# Patient Record
Sex: FEMALE | Race: WHITE | Employment: UNEMPLOYED | URBAN - METROPOLITAN AREA
[De-identification: names, ages, dates, MRNs, and addresses within clinical notes are randomized per-mention and may not be internally consistent; named-entity substitution may affect disease eponyms.]

---

## 2015-06-24 LAB — GLUCOSE SERPL-MCNC: 164 MG/DL (ref 70–139)

## 2017-03-27 ENCOUNTER — TELEPHONE (OUTPATIENT)
Dept: INTERNAL MEDICINE | Facility: CLINIC | Age: 42
End: 2017-03-27

## 2017-03-27 DIAGNOSIS — Z12.39 SCREENING FOR BREAST CANCER: Primary | ICD-10-CM

## 2017-03-27 NOTE — TELEPHONE ENCOUNTER
Reason for Call: Request for an order or referral:    Order or referral being requested: mammogram    Date needed: as soon as possible    Has the patient been seen by the PCP for this problem? YES    Additional comments: none    Phone number Patient can be reached at:  Home number on file [phone] (home)    Best Time:  any    Can we leave a detailed message on this number?  YES    Call taken on 3/27/2017 at 12:02 PM by Kaity Alejandra

## 2017-03-28 NOTE — TELEPHONE ENCOUNTER
Last OV with PCP: 12/23/16 for rash. Per [employer] pt has appt scheduled for MA for tomorrow.    Please advise, thanks.

## 2017-03-29 ENCOUNTER — HOSPITAL ENCOUNTER (OUTPATIENT)
Dept: MAMMOGRAPHY | Facility: CLINIC | Age: 42
Discharge: HOME | End: 2017-03-29
Attending: OBSTETRICS & GYNECOLOGY | Admitting: OBSTETRICS & GYNECOLOGY
Payer: COMMERCIAL

## 2017-03-29 DIAGNOSIS — Z12.31 VISIT FOR SCREENING MAMMOGRAM: ICD-10-CM

## 2017-03-29 PROCEDURE — G0202 SCR MAMMO BI INCL CAD: HCPCS

## 2017-04-05 ENCOUNTER — HOSPITAL ENCOUNTER (OUTPATIENT)
Dept: ULTRASOUND IMAGING | Facility: CLINIC | Age: 42
End: 2017-04-05
Attending: OBSTETRICS & GYNECOLOGY
Payer: COMMERCIAL

## 2017-04-05 ENCOUNTER — HOSPITAL ENCOUNTER (OUTPATIENT)
Dept: MAMMOGRAPHY | Facility: CLINIC | Age: 42
Discharge: HOME | End: 2017-04-05
Attending: OBSTETRICS & GYNECOLOGY | Admitting: OBSTETRICS & GYNECOLOGY
Payer: COMMERCIAL

## 2017-04-05 DIAGNOSIS — R92.8 ABNORMAL MAMMOGRAM: ICD-10-CM

## 2017-04-05 PROCEDURE — 76642 ULTRASOUND BREAST LIMITED: CPT | Mod: RT

## 2017-04-05 PROCEDURE — G0279 TOMOSYNTHESIS, MAMMO: HCPCS

## 2017-04-10 ENCOUNTER — TRANSFERRED RECORDS (OUTPATIENT)
Dept: HEALTH INFORMATION MANAGEMENT | Facility: CLINIC | Age: 42
End: 2017-04-10

## 2017-04-14 ENCOUNTER — TRANSFERRED RECORDS (OUTPATIENT)
Dept: HEALTH INFORMATION MANAGEMENT | Facility: CLINIC | Age: 42
End: 2017-04-14

## 2017-04-19 ENCOUNTER — TRANSFERRED RECORDS (OUTPATIENT)
Dept: HEALTH INFORMATION MANAGEMENT | Facility: CLINIC | Age: 42
End: 2017-04-19

## 2017-04-23 LAB
ALT SERPL-CCNC: 13 U/L (ref 6–29)
AST SERPL-CCNC: 15 U/L (ref 10–30)
CREAT SERPL-MCNC: 0.7 MG/DL (ref 0.5–1.1)
GFR SERPL CREATININE-BSD FRML MDRD: 108 ML/MIN/1.73M2
GLUCOSE SERPL-MCNC: 89 MG/DL (ref 65–99)
HEP C HIM: NORMAL
POTASSIUM SERPL-SCNC: 4.3 MMOL/L (ref 3.5–5.3)

## 2017-04-25 ENCOUNTER — TRANSFERRED RECORDS (OUTPATIENT)
Dept: HEALTH INFORMATION MANAGEMENT | Facility: CLINIC | Age: 42
End: 2017-04-25

## 2017-04-25 LAB
C TRACH DNA SPEC QL PROBE+SIG AMP: NOT DETECTED
N GONORRHOEA DNA SPEC QL PROBE+SIG AMP: NOT DETECTED
SPECIMEN DESCRIP: NORMAL
SPECIMEN DESCRIPTION: NORMAL

## 2017-05-17 ENCOUNTER — OFFICE VISIT (OUTPATIENT)
Dept: FAMILY MEDICINE | Facility: CLINIC | Age: 42
End: 2017-05-17
Payer: COMMERCIAL

## 2017-05-17 VITALS
BODY MASS INDEX: 27.28 KG/M2 | DIASTOLIC BLOOD PRESSURE: 88 MMHG | HEART RATE: 75 BPM | WEIGHT: 184.19 LBS | TEMPERATURE: 98.7 F | SYSTOLIC BLOOD PRESSURE: 138 MMHG | HEIGHT: 69 IN

## 2017-05-17 DIAGNOSIS — E28.2 PCOS (POLYCYSTIC OVARIAN SYNDROME): ICD-10-CM

## 2017-05-17 DIAGNOSIS — Z00.00 ENCOUNTER FOR ROUTINE ADULT HEALTH EXAMINATION WITHOUT ABNORMAL FINDINGS: ICD-10-CM

## 2017-05-17 DIAGNOSIS — F41.9 ANXIETY: Primary | ICD-10-CM

## 2017-05-17 DIAGNOSIS — Z71.3 NUTRITIONAL COUNSELING: ICD-10-CM

## 2017-05-17 DIAGNOSIS — G43.809 OTHER MIGRAINE WITHOUT STATUS MIGRAINOSUS, NOT INTRACTABLE: ICD-10-CM

## 2017-05-17 DIAGNOSIS — Z98.890 H/O LEEP: ICD-10-CM

## 2017-05-17 PROCEDURE — 99213 OFFICE O/P EST LOW 20 MIN: CPT | Mod: 25 | Performed by: FAMILY MEDICINE

## 2017-05-17 PROCEDURE — 99396 PREV VISIT EST AGE 40-64: CPT | Performed by: FAMILY MEDICINE

## 2017-05-17 RX ORDER — SUMATRIPTAN 100 MG/1
100 TABLET, FILM COATED ORAL
Qty: 18 TABLET | Refills: 3 | Status: SHIPPED | OUTPATIENT
Start: 2017-05-17 | End: 2017-08-03

## 2017-05-17 RX ORDER — MULTIVIT-MIN/IRON/FOLIC ACID/K 18-600-40
CAPSULE ORAL DAILY
COMMUNITY
End: 2018-08-09

## 2017-05-17 RX ORDER — PRENATAL VIT/IRON FUM/FOLIC AC 27MG-0.8MG
1 TABLET ORAL DAILY
COMMUNITY
End: 2017-06-14

## 2017-05-17 RX ORDER — HYDROXYZINE PAMOATE 25 MG/1
25-50 CAPSULE ORAL 3 TIMES DAILY PRN
Qty: 30 CAPSULE | Refills: 3 | Status: SHIPPED | OUTPATIENT
Start: 2017-05-17 | End: 2018-01-30

## 2017-05-17 ASSESSMENT — ANXIETY QUESTIONNAIRES
1. FEELING NERVOUS, ANXIOUS, OR ON EDGE: MORE THAN HALF THE DAYS
5. BEING SO RESTLESS THAT IT IS HARD TO SIT STILL: MORE THAN HALF THE DAYS
6. BECOMING EASILY ANNOYED OR IRRITABLE: NEARLY EVERY DAY
7. FEELING AFRAID AS IF SOMETHING AWFUL MIGHT HAPPEN: SEVERAL DAYS
GAD7 TOTAL SCORE: 14
3. WORRYING TOO MUCH ABOUT DIFFERENT THINGS: MORE THAN HALF THE DAYS
2. NOT BEING ABLE TO STOP OR CONTROL WORRYING: MORE THAN HALF THE DAYS
IF YOU CHECKED OFF ANY PROBLEMS ON THIS QUESTIONNAIRE, HOW DIFFICULT HAVE THESE PROBLEMS MADE IT FOR YOU TO DO YOUR WORK, TAKE CARE OF THINGS AT HOME, OR GET ALONG WITH OTHER PEOPLE: SOMEWHAT DIFFICULT

## 2017-05-17 ASSESSMENT — PATIENT HEALTH QUESTIONNAIRE - PHQ9: 5. POOR APPETITE OR OVEREATING: MORE THAN HALF THE DAYS

## 2017-05-17 NOTE — Clinical Note
Please abstract the following data from this visit with this patient into the appropriate field in Epic:  Pap smear done on this date: 9/15/2016 (approximately), by this group: [facility], results were normal.

## 2017-05-17 NOTE — NURSING NOTE
"Chief Complaint   Patient presents with     Physical       Initial /88 (BP Location: Right arm, Patient Position: Chair, Cuff Size: Adult Regular)  Pulse 75  Temp 98.7  F (37.1  C) (Oral)  Ht 5' 8.75\" (1.746 m)  Wt 184 lb 3 oz (83.5 kg)  LMP 05/17/2017  Breastfeeding? No  BMI 27.4 kg/m2 Estimated body mass index is 27.4 kg/(m^2) as calculated from the following:    Height as of this encounter: 5' 8.75\" (1.746 m).    Weight as of this encounter: 184 lb 3 oz (83.5 kg).  Medication Reconciliation: complete     Health maintenance- map given.    Zach Frias CMA            "

## 2017-05-17 NOTE — PATIENT INSTRUCTIONS
https://ods.od.nih.gov/factsheets/Calcium-HealthProfessional/     Preventive Health Recommendations  Female Ages 40 to 49    Yearly exam:     See your health care provider every year in order to  1. Review health changes.   2. Discuss preventive care.    3. Review your medicines if your doctor prescribed any.      Get a Pap test every three years (unless you have an abnormal result and your provider advises testing more often).      If you get Pap tests with HPV test, you only need to test every 5 years, unless you have an abnormal result. You do not need a Pap test if your uterus was removed (hysterectomy) and you have not had cancer.      You should be tested each year for STDs (sexually transmitted diseases), if you're at risk.       Ask your doctor if you should have a mammogram.      Have a colonoscopy (test for colon cancer) if someone in your family has had colon cancer or polyps before age 50.       Have a cholesterol test every 5 years.       Have a diabetes test (fasting glucose) after age 45. If you are at risk for diabetes, you should have this test every 3 years.    Shots: Get a flu shot each year. Get a tetanus shot every 10 years.     Nutrition:     Eat at least 5 servings of fruits and vegetables each day.    Eat whole-grain bread, whole-wheat pasta and brown rice instead of white grains and rice.    Talk to your provider about Calcium and Vitamin D.     Lifestyle    Exercise at least 150 minutes a week (an average of 30 minutes a day, 5 days a week). This will help you control your weight and prevent disease.    Limit alcohol to one drink per day.    No smoking.     Wear sunscreen to prevent skin cancer.    See your dentist every six months for an exam and cleaning.

## 2017-05-17 NOTE — LETTER
My Migraine Action Plan      Date: 5/17/2017     My Name: Radha Fuentes   YOB: 1975  My Pharmacy:    [facility] [address] [place], [place] - [address]  [facility] - [place], [place] - [address] AT [facility] [address] [place] [address]       My (Preventative) Control Medicine: ***        My Rescue Medicine: ***   My Doctor: Sumi Carreon     My Clinic: [facility]  [address] [facility] [address] [phone]        GREEN ZONE = Good Control    My headache plan is working.   I can do what I need to do.           I WILL:     ? Keep managing my triggers.  ? Write in my migraine diary each time I have a headache.  ? Keep taking my preventive (controller) medicine daily.  ? Take my relief and rescue medicine as needed.             YELLOW ZONE = Not Enough Control    My headache plan isn t always working.   My headaches keep me from doing   some of the things I need to do.       I WILL:     ? Set goals to control my triggers and act on them.  ? Write in my migraine diary each time I have a headache and review it for                      patterns or new triggers.  ? Keep taking my preventive (controller) medicine daily.  ? Take my relief and rescue medicine as needed.  ? Call my doctor or clinic at if I stay in the Yellow Zone.             RED ZONE = Poor or No Control    My headache plan has  failed. I can t do anything  when I have one. My  medicines aren t working.           I WILL:   ? Set goals to control my triggers and act on them.  ? Write in my migraine diary each time I have a headache and review it for                      patterns or new triggers.  ? Keep taking my preventive (controller) medicine daily.  ? Take my relief and rescue medicine as needed.  ? Call my doctor or clinic or go to urgent care or an ER if I m having the worst                  headache of my life.  ? Call my doctor or clinic or go to  urgent care or an ER if my medicine doesn t work.  ? Let my doctor or clinic know within 2 weeks if I have gone to an urgent care or             emergency department.          Provider specific instructions:  ***

## 2017-05-17 NOTE — PROGRESS NOTES
SUBJECTIVE:     CC: Radha Fuentes is an 41 year old woman who presents for preventive health visit.     Healthy Habits:    Do you get at least three servings of calcium containing foods daily (dairy, green leafy vegetables, etc.)? no, taking calcium and/or vitamin D supplement: yes - D    Amount of exercise or daily activities, outside of work: 2 day(s) per week    Problems taking medications regularly No    Medication side effects: No    Have you had an eye exam in the past two years? yes    Do you see a dentist twice per year? yes    Do you have sleep apnea, excessive snoring or daytime drowsiness? no    Please abstract the following data from this visit with this patient into the appropriate field in Epic:    Pap smear done on this date: 9/15/2016 (approximately), by this group: Casie MYLES, results were normal.     History of LEEP in 2012, normal paps since. In 1990s, had cone biopsy.     Is on a restrictive 2930-1276 calorie diet, worries about getting enough calcium and iron. Working with IVF specialist through [facility], hoping for pregnancy in the next year. Has a 20 month old and 14 year old at home. Taking PNV. History of PCOS, was previously on metformin, not taking any longer.     Feels like she has significant anxiety a few times per month, feels like she loses control. Has been in therapy in the past, didn't find it very helpful. Has been on ativan and SSRIs in the past. Not interested in daily medication to help with symptoms that occur a few times monthly.     Follows with Neurology for migraines, would like me to take over imitrex scripts. Feels these are very well controlled.       Today's PHQ-2 Score:   PHQ-2 ( 1999 Pfizer) 1/13/2015   Q1: Little interest or pleasure in doing things 0   Q2: Feeling down, depressed or hopeless 0   PHQ-2 Score 0       Abuse: Current or Past(Physical, Sexual or Emotional)- No  Do you feel safe in your environment - Yes    Social History   Substance Use Topics  "    Smoking status: Former Smoker     Smokeless tobacco: Not on file     Alcohol use No     The patient does not drink >3 drinks per day nor >7 drinks per week.    Recent Labs   Lab Test  01/05/11   1153   CHOL  186   HDL  67   LDL  101   TRIG  85   CHOLHDLRATIO  2.8       Reviewed orders with patient.  Reviewed health maintenance and updated orders accordingly - Yes    Mammo Decision Support:  Patient under age 50, mutual decision reflected in health maintenance.      Pertinent mammograms are reviewed under the imaging tab.  History of abnormal Pap smear: NO - age 30- 65 PAP every 3 years recommended    Reviewed and updated as needed this visit by clinical staff  Tobacco  Allergies  Meds  Med Hx  Surg Hx  Fam Hx  Soc Hx        Reviewed and updated as needed this visit by Provider        ROS:  C: NEGATIVE for fever, chills, change in weight  I: NEGATIVE for worrisome rashes, moles or lesions  E: NEGATIVE for vision changes or irritation  ENT: NEGATIVE for ear, mouth and throat problems  R: NEGATIVE for significant cough or SOB  B: NEGATIVE for masses, tenderness or discharge  CV: NEGATIVE for chest pain, palpitations or peripheral edema  GI: NEGATIVE for nausea, abdominal pain, heartburn, or change in bowel habits  : NEGATIVE for unusual urinary or vaginal symptoms. Periods are irregular.  M: NEGATIVE for significant arthralgias or myalgia  N: NEGATIVE for weakness, dizziness or paresthesias  P: NEGATIVE for changes in mood or affect    Problem list, Medication list, Allergies, and Medical/Social/Surgical histories reviewed in [facility] and updated as appropriate.  OBJECTIVE:     /88 (BP Location: Right arm, Patient Position: Chair, Cuff Size: Adult Regular)  Pulse 75  Temp 98.7  F (37.1  C) (Oral)  Ht 5' 8.75\" (1.746 m)  Wt 184 lb 3 oz (83.5 kg)  LMP 05/17/2017  Breastfeeding? No  BMI 27.4 kg/m2  EXAM:  GENERAL: healthy, alert and no distress  EYES: Eyes grossly normal to inspection, PERRL and " conjunctivae and sclerae normal  HENT: ear canals and TM's normal, nose and mouth without ulcers or lesions  NECK: no adenopathy, no asymmetry, masses, or scars and thyroid normal to palpation  RESP: lungs clear to auscultation - no rales, rhonchi or wheezes  CV: regular rate and rhythm, normal S1 S2, no S3 or S4, no murmur, click or rub, no peripheral edema and peripheral pulses strong  ABDOMEN: soft, nontender, no hepatosplenomegaly, no masses and bowel sounds normal  MS: no gross musculoskeletal defects noted, no edema  SKIN: no suspicious lesions or rashes  NEURO: Normal strength and tone, mentation intact and speech normal  PSYCH: mentation appears normal, affect normal/bright    ASSESSMENT/PLAN:       1. Encounter for routine adult health examination without abnormal findings  - UTD with pap and mammogram  - UTD with vaccines    2. Anxiety - discussed not ideal to go to benzos due to addictive potential, suggested starting with vistaril, contact if symptoms not improved.   - hydrOXYzine (VISTARIL) 25 MG capsule; Take 1-2 capsules (25-50 mg) by mouth 3 times daily as needed for itching  Dispense: 30 capsule; Refill: 3    3. Other migraine without status migrainosus, not intractable - stable, refills  - SUMAtriptan (IMITREX) 100 MG tablet; Take 1 tablet (100 mg) by mouth at onset of headache for migraine May repeat in 2 hours. Max 2 tablets/24 hours.  Dispense: 18 tablet; Refill: 3    4. PCOS (polycystic ovarian syndrome) - stable, following with IVF at [facility] for conception    5. H/O LEEP - recent pap normal, SHOAIB signed today    6. Nutritional counseling - spent a lengthy amount of time counseling on calcium and iron sources, suggested ODS for further info regarding these as well as other vitamin/mineral sources.       COUNSELING:   Reviewed preventive health counseling, as reflected in patient instructions     reports that she has quit smoking. She does not have any smokeless tobacco history on  "file.    Estimated body mass index is 27.4 kg/(m^2) as calculated from the following:    Height as of this encounter: 5' 8.75\" (1.746 m).    Weight as of this encounter: 184 lb 3 oz (83.5 kg).       Sumi Carreon MD  [facility]  "

## 2017-05-17 NOTE — MR AVS SNAPSHOT
After Visit Summary   5/17/2017    Radha Fuentes    MRN: [id number]           Patient Information     Date Of Birth          1975        Visit Information        Provider Department      5/17/2017 3:30 PM Sumi Carreon MD [facility]        Today's Diagnoses     Anxiety    -  1    Other migraine without status migrainosus, not intractable          Care Instructions    https://ods.od.nih.gov/factsheets/Calcium-HealthProfessional/     Preventive Health Recommendations  Female Ages 40 to 49    Yearly exam:     See your health care provider every year in order to  1. Review health changes.   2. Discuss preventive care.    3. Review your medicines if your doctor prescribed any.      Get a Pap test every three years (unless you have an abnormal result and your provider advises testing more often).      If you get Pap tests with HPV test, you only need to test every 5 years, unless you have an abnormal result. You do not need a Pap test if your uterus was removed (hysterectomy) and you have not had cancer.      You should be tested each year for STDs (sexually transmitted diseases), if you're at risk.       Ask your doctor if you should have a mammogram.      Have a colonoscopy (test for colon cancer) if someone in your family has had colon cancer or polyps before age 50.       Have a cholesterol test every 5 years.       Have a diabetes test (fasting glucose) after age 45. If you are at risk for diabetes, you should have this test every 3 years.    Shots: Get a flu shot each year. Get a tetanus shot every 10 years.     Nutrition:     Eat at least 5 servings of fruits and vegetables each day.    Eat whole-grain bread, whole-wheat pasta and brown rice instead of white grains and rice.    Talk to your provider about Calcium and Vitamin D.     Lifestyle    Exercise at least 150 minutes a week (an average of 30 minutes a day, 5 days a week). This will help you control your weight and  "prevent disease.    Limit alcohol to one drink per day.    No smoking.     Wear sunscreen to prevent skin cancer.    See your dentist every six months for an exam and cleaning.        Follow-ups after your visit        Who to contact     If you have questions or need follow up information about today's clinic visit or your schedule please contact [facility] directly at [phone].  Normal or non-critical lab and imaging results will be communicated to you by [employer], letter or phone within 4 business days after the clinic has received the results. If you do not hear from us within 7 days, please contact the clinic through [employer] or phone. If you have a critical or abnormal lab result, we will notify you by phone as soon as possible.  Submit refill requests through [employer] or call your pharmacy and they will forward the refill request to us. Please allow 3 business days for your refill to be completed.          Additional Information About Your Visit        [employer] Information     [employer] gives you secure access to your electronic health record. If you see a primary care provider, you can also send messages to your care team and make appointments. If you have questions, please call your primary care clinic.  If you do not have a primary care provider, please call [phone] and they will assist you.        Care EveryWhere ID     This is your Care EveryWhere ID. This could be used by other organizations to access your [facility] medical records  [id number]        Your Vitals Were     Pulse Temperature Height Last Period Breastfeeding? BMI (Body Mass Index)    75 98.7  F (37.1  C) (Oral) 5' 8.75\" (1.746 m) 05/17/2017 No 27.4 kg/m2       Blood Pressure from Last 3 Encounters:   05/17/17 138/88   12/23/16 110/80   06/21/16 110/74    Weight from Last 3 Encounters:   05/17/17 184 lb 3 oz (83.5 kg)   12/23/16 193 lb (87.5 kg)   06/21/16 205 lb 11.2 oz (93.3 kg)              We Performed the Following  "    MIGRAINE ACTION PLAN          Today's Medication Changes          These changes are accurate as of: 5/17/17  4:26 PM.  If you have any questions, ask your nurse or doctor.               Start taking these medicines.        Dose/Directions    hydrOXYzine 25 MG capsule   Commonly known as:  VISTARIL   Used for:  Anxiety   Started by:  Sumi Carreon MD        Dose:  25-50 mg   Take 1-2 capsules (25-50 mg) by mouth 3 times daily as needed for itching   Quantity:  30 capsule   Refills:  3       SUMAtriptan 100 MG tablet   Commonly known as:  IMITREX   Used for:  Other migraine without status migrainosus, not intractable   Started by:  Sumi Carreon MD        Dose:  100 mg   Take 1 tablet (100 mg) by mouth at onset of headache for migraine May repeat in 2 hours. Max 2 tablets/24 hours.   Quantity:  18 tablet   Refills:  3         Stop taking these medicines if you haven't already. Please contact your care team if you have questions.     fluconazole 150 MG tablet   Commonly known as:  DIFLUCAN   Stopped by:  Sumi Carreon MD           ketoconazole 2 % cream   Commonly known as:  NIZORAL   Stopped by:  Sumi Carreon MD           metFORMIN 500 MG 24 hr tablet   Commonly known as:  GLUCOPHAGE-XR   Stopped by:  Sumi Carreon MD           predniSONE 10 MG tablet   Commonly known as:  DELTASONE   Stopped by:  Sumi Carreon MD           TYLENOL PO   Stopped by:  Sumi Carreon MD                Where to get your medicines      These medications were sent to [facility] [address] [place] [address] [id number] [place] [address] AT [address] [address] [facility] [address]     Phone:  [phone]     hydrOXYzine 25 MG capsule    SUMAtriptan 100 MG tablet                Primary Care Provider Office Phone # Fax #    Sumi Carreon [phone]       [facility] [address] [address] [facility] [address]        Thank you!      Thank you for choosing [facility]  for your care. Our goal is always to provide you with excellent care. Hearing back from our patients is one way we can continue to improve our services. Please take a few minutes to complete the written survey that you may receive in the mail after your visit with us. Thank you!             Your Updated Medication List - Protect others around you: Learn how to safely use, store and throw away your medicines at www.disposemymeds.org.          This list is accurate as of: 5/17/17  4:26 PM.  Always use your most recent med list.                   Brand Name Dispense Instructions for use    hydrOXYzine 25 MG capsule    VISTARIL    30 capsule    Take 1-2 capsules (25-50 mg) by mouth 3 times daily as needed for itching       prenatal multivitamin  plus iron 27-0.8 MG Tabs per tablet      Take 1 tablet by mouth daily       SUMAtriptan 100 MG tablet    IMITREX    18 tablet    Take 1 tablet (100 mg) by mouth at onset of headache for migraine May repeat in 2 hours. Max 2 tablets/24 hours.       vitamin D 2000 UNITS Caps      Take by mouth daily

## 2017-05-18 ASSESSMENT — PATIENT HEALTH QUESTIONNAIRE - PHQ9: SUM OF ALL RESPONSES TO PHQ QUESTIONS 1-9: 8

## 2017-05-18 ASSESSMENT — ANXIETY QUESTIONNAIRES: GAD7 TOTAL SCORE: 14

## 2017-05-19 ENCOUNTER — MYC MEDICAL ADVICE (OUTPATIENT)
Dept: FAMILY MEDICINE | Facility: CLINIC | Age: 42
End: 2017-05-19

## 2017-05-19 DIAGNOSIS — F41.9 ANXIETY: Primary | ICD-10-CM

## 2017-05-23 RX ORDER — DULOXETIN HYDROCHLORIDE 30 MG/1
CAPSULE, DELAYED RELEASE ORAL
Qty: 60 CAPSULE | Refills: 1 | Status: SHIPPED | OUTPATIENT
Start: 2017-05-23 | End: 2017-06-14

## 2017-05-24 ENCOUNTER — TELEPHONE (OUTPATIENT)
Dept: FAMILY MEDICINE | Facility: CLINIC | Age: 42
End: 2017-05-24

## 2017-05-24 NOTE — TELEPHONE ENCOUNTER
PA submitted thru covermymeds for DULoxetine (CYMBALTA) 30 MG EC capsule    [facility]    Phone@ [phone]  Patient ID# [id number]    Davon NGUYEN

## 2017-06-10 ENCOUNTER — MYC MEDICAL ADVICE (OUTPATIENT)
Dept: FAMILY MEDICINE | Facility: CLINIC | Age: 42
End: 2017-06-10

## 2017-06-12 NOTE — TELEPHONE ENCOUNTER
Pt requests that writer send medication change request to providers in clinic cause she can't wait until tomorrow when PCP is back.  Please advise if able  Tyler Perez RN, BSN

## 2017-06-13 NOTE — TELEPHONE ENCOUNTER
Now she is wanting the Effexor instead of the Wellbutrin?     She should have a follow up to discuss what is the best next option. JH

## 2017-06-14 ENCOUNTER — OFFICE VISIT (OUTPATIENT)
Dept: FAMILY MEDICINE | Facility: CLINIC | Age: 42
End: 2017-06-14
Payer: COMMERCIAL

## 2017-06-14 VITALS
OXYGEN SATURATION: 100 % | HEIGHT: 69 IN | WEIGHT: 173 LBS | BODY MASS INDEX: 25.62 KG/M2 | TEMPERATURE: 98.5 F | DIASTOLIC BLOOD PRESSURE: 78 MMHG | SYSTOLIC BLOOD PRESSURE: 118 MMHG | HEART RATE: 71 BPM

## 2017-06-14 DIAGNOSIS — F41.9 ANXIETY: Primary | ICD-10-CM

## 2017-06-14 DIAGNOSIS — F41.9 ANXIETY: ICD-10-CM

## 2017-06-14 PROCEDURE — 99213 OFFICE O/P EST LOW 20 MIN: CPT | Performed by: FAMILY MEDICINE

## 2017-06-14 RX ORDER — VENLAFAXINE HYDROCHLORIDE 37.5 MG/1
CAPSULE, EXTENDED RELEASE ORAL
Qty: 60 CAPSULE | Refills: 0 | OUTPATIENT
Start: 2017-06-14

## 2017-06-14 RX ORDER — QUETIAPINE FUMARATE 25 MG/1
25-50 TABLET, FILM COATED ORAL DAILY PRN
Qty: 10 TABLET | Refills: 0 | Status: SHIPPED | OUTPATIENT
Start: 2017-06-14 | End: 2018-01-30

## 2017-06-14 RX ORDER — QUETIAPINE FUMARATE 25 MG/1
25-50 TABLET, FILM COATED ORAL DAILY PRN
Qty: 10 TABLET | Refills: 0 | OUTPATIENT
Start: 2017-06-14

## 2017-06-14 RX ORDER — VENLAFAXINE HYDROCHLORIDE 37.5 MG/1
CAPSULE, EXTENDED RELEASE ORAL
Qty: 60 CAPSULE | Refills: 0 | Status: SHIPPED | OUTPATIENT
Start: 2017-06-14 | End: 2017-07-13

## 2017-06-14 NOTE — NURSING NOTE
"Chief Complaint   Patient presents with     Recheck Medication     f/u       Initial /78 (BP Location: Right arm, Patient Position: Sitting, Cuff Size: Adult Regular)  Pulse 71  Temp 98.5  F (36.9  C) (Oral)  Ht 5' 8.75\" (1.746 m)  Wt 173 lb (78.5 kg)  LMP 06/10/2017  SpO2 100%  Breastfeeding? No  BMI 25.73 kg/m2 Estimated body mass index is 25.73 kg/(m^2) as calculated from the following:    Height as of this encounter: 5' 8.75\" (1.746 m).    Weight as of this encounter: 173 lb (78.5 kg).  Medication Reconciliation: leigh Loaiza CMA      "

## 2017-06-14 NOTE — MR AVS SNAPSHOT
"              After Visit Summary   6/14/2017    Radha Fuentes    MRN: [id number]           Patient Information     Date Of Birth          1975        Visit Information        Provider Department      6/14/2017 1:30 PM Sumi Carreon MD [facility]        Today's Diagnoses     Anxiety    -  1       Follow-ups after your visit        Who to contact     If you have questions or need follow up information about today's clinic visit or your schedule please contact [facility] directly at [phone].  Normal or non-critical lab and imaging results will be communicated to you by CurTranhart, letter or phone within 4 business days after the clinic has received the results. If you do not hear from us within 7 days, please contact the clinic through miit or phone. If you have a critical or abnormal lab result, we will notify you by phone as soon as possible.  Submit refill requests through The Moment or call your pharmacy and they will forward the refill request to us. Please allow 3 business days for your refill to be completed.          Additional Information About Your Visit        MyChart Information     The Moment gives you secure access to your electronic health record. If you see a primary care provider, you can also send messages to your care team and make appointments. If you have questions, please call your primary care clinic.  If you do not have a primary care provider, please call [phone] and they will assist you.        Care EveryWhere ID     This is your Care EveryWhere ID. This could be used by other organizations to access your [facility] medical records  [id number]        Your Vitals Were     Pulse Temperature Height Last Period Pulse Oximetry Breastfeeding?    71 98.5  F (36.9  C) (Oral) 5' 8.75\" (1.746 m) 06/10/2017 100% No    BMI (Body Mass Index)                   25.73 kg/m2            Blood Pressure from Last 3 Encounters:   06/14/17 118/78   05/17/17 138/88 "   12/23/16 110/80    Weight from Last 3 Encounters:   06/14/17 173 lb (78.5 kg)   05/17/17 184 lb 3 oz (83.5 kg)   12/23/16 193 lb (87.5 kg)              Today, you had the following     No orders found for display         Today's Medication Changes          These changes are accurate as of: 6/14/17  2:13 PM.  If you have any questions, ask your nurse or doctor.               Start taking these medicines.        Dose/Directions    QUEtiapine 25 MG tablet   Commonly known as:  SEROQUEL   Used for:  Anxiety   Started by:  Sumi Carreon MD        Dose:  25-50 mg   Take 1-2 tablets (25-50 mg) by mouth daily as needed   Quantity:  10 tablet   Refills:  0       venlafaxine 37.5 MG 24 hr capsule   Commonly known as:  EFFEXOR-XR   Used for:  Anxiety   Started by:  Sumi Carreon MD        Take 1 capsule daily for 3-5 days, then take 2 capsules daily.   Quantity:  60 capsule   Refills:  0            Where to get your medicines      These medications were sent to [facility] [address] [place] [address] [id number] [place] [address] AT [address]  [address] [facility] [address]     Phone:  [phone]     QUEtiapine 25 MG tablet    venlafaxine 37.5 MG 24 hr capsule                Primary Care Provider Office Phone # Fax #    Sumi Carreon [phone]       [facility] [address] [address]  [facility] [address]        Thank you!     Thank you for choosing [facility]  for your care. Our goal is always to provide you with excellent care. Hearing back from our patients is one way we can continue to improve our services. Please take a few minutes to complete the written survey that you may receive in the mail after your visit with us. Thank you!             Your Updated Medication List - Protect others around you: Learn how to safely use, store and throw away your medicines at www.disposemymeds.org.          This list is accurate as of: 6/14/17  2:13  PM.  Always use your most recent med list.                   Brand Name Dispense Instructions for use    hydrOXYzine 25 MG capsule    VISTARIL    30 capsule    Take 1-2 capsules (25-50 mg) by mouth 3 times daily as needed for itching       QUEtiapine 25 MG tablet    SEROQUEL    10 tablet    Take 1-2 tablets (25-50 mg) by mouth daily as needed       SUMAtriptan 100 MG tablet    IMITREX    18 tablet    Take 1 tablet (100 mg) by mouth at onset of headache for migraine May repeat in 2 hours. Max 2 tablets/24 hours.       venlafaxine 37.5 MG 24 hr capsule    EFFEXOR-XR    60 capsule    Take 1 capsule daily for 3-5 days, then take 2 capsules daily.       vitamin D 2000 UNITS Caps      Take by mouth daily

## 2017-06-14 NOTE — PROGRESS NOTES
SUBJECTIVE:                                                    Radha Fuentes is a 42 year old female who presents to clinic today for the following health issues:      Medication Followup     Taking Medication as prescribed: yes    Side Effects:  Vistaral, gets tired    Medication Helping Symptoms:  yes       Reports sedation on Vistaril, although she felt it helped with her anxiety.    Cymbalta was too expensive.    Reports more anxiety as of late. Reports daily anxiety. Previously she was having episodes of anxiety 2-3 times monthly. She started seeing a new therapist, whom she finds helpful.    Report she has been on both Effexor and Wellbutrin in the past. Would like to try one of these agents to help control her anxiety. Reports that she was reading that Wellbutrin is not really medication typically used for anxiety.    Still requests something on an as-needed basis that she can take for increased anxiety.      Problem list and histories reviewed & adjusted, as indicated.  Additional history: none    Patient Active Problem List   Diagnosis     PCOS (polycystic ovarian syndrome)     Family history of aortic aneurysm- dad with marfan     Other migraine without status migrainosus, not intractable     H/O LEEP     Anxiety     Past Surgical History:   Procedure Laterality Date     ARTHROSCOPY KNEE  1991    left     Breast reconstruction with implants  1998     CHOLECYSTECTOMY  2010     TONSILLECTOMY  1996       Social History   Substance Use Topics     Smoking status: Former Smoker     Smokeless tobacco: Never Used     Alcohol use No     Family History   Problem Relation Age of Onset     HEART DISEASE Mother      mitral valve replacement at age 64     Aneurysm Mother            Reviewed and updated as needed this visit by clinical staff       Reviewed and updated as needed this visit by Provider         ROS:  Constitutional, HEENT, cardiovascular, pulmonary, gi and gu systems are negative, except as otherwise  "noted.    OBJECTIVE:                                                    /78 (BP Location: Right arm, Patient Position: Sitting, Cuff Size: Adult Regular)  Pulse 71  Temp 98.5  F (36.9  C) (Oral)  Ht 5' 8.75\" (1.746 m)  Wt 173 lb (78.5 kg)  LMP 06/10/2017  SpO2 100%  Breastfeeding? No  BMI 25.73 kg/m2  Body mass index is 25.73 kg/(m^2).  GENERAL: healthy, alert and no distress  CV: regular rate and rhythm, normal S1 S2, no S3 or S4, no murmur, click or rub, no peripheral edema and peripheral pulses strong  PSYCH: mentation appears normal, affect normal/bright    Diagnostic Test Results:  none      ASSESSMENT/PLAN:                                                      1. Anxiety - discussed restarting Effexor, as it will likely help with her anxiety. Discussed potential side effects. Discussed as needed anxiolytics. She was too sedated on Vistaril. Wants to avoid benzodiazepines if possible. Discussed Seroquel as a potential option, discussed the potential for sedation with any anxiolytic. Follow-up in one month - can send me a [employer] message.  - Continue psychotherapy  - venlafaxine (EFFEXOR-XR) 37.5 MG 24 hr capsule; Take 1 capsule daily for 3-5 days, then take 2 capsules daily.  Dispense: 60 capsule; Refill: 0  - QUEtiapine (SEROQUEL) 25 MG tablet; Take 1-2 tablets (25-50 mg) by mouth daily as needed  Dispense: 10 tablet; Refill: 0      Sumi Carreon MD  [facility]    "

## 2017-07-13 DIAGNOSIS — F41.9 ANXIETY: ICD-10-CM

## 2017-07-13 NOTE — TELEPHONE ENCOUNTER
venlafaxine    Last Written Prescription Date: 6-14-17  Last Fill Quantity: 60, # refills: 0  Last Office Visit with G, P or [facility] prescribing provider: 6-14-17        BP Readings from Last 3 Encounters:   06/14/17 118/78   05/17/17 138/88   12/23/16 110/80     Pulse: (for Fetzima)  Creatinine   Date Value Ref Range Status   04/23/2017 0.70 0.50 - 1.10 mg/dL Final   ]    Last PHQ-9 score on record=   PHQ-9 SCORE 5/17/2017   Total Score 8

## 2017-07-14 RX ORDER — VENLAFAXINE HYDROCHLORIDE 37.5 MG/1
CAPSULE, EXTENDED RELEASE ORAL
Qty: 14 CAPSULE | Refills: 0 | Status: SHIPPED | OUTPATIENT
Start: 2017-07-14 | End: 2018-01-30

## 2017-07-14 NOTE — TELEPHONE ENCOUNTER
Pt read mychart but has not scheduled a follow up.  Do you want to give a refill due to the weekend?  Tyler Perez RN, BSN

## 2017-07-19 ENCOUNTER — VIRTUAL VISIT (OUTPATIENT)
Dept: FAMILY MEDICINE | Facility: CLINIC | Age: 42
End: 2017-07-19
Payer: COMMERCIAL

## 2017-07-19 DIAGNOSIS — F41.9 ANXIETY: Primary | ICD-10-CM

## 2017-07-19 PROCEDURE — 99442 ZZC PHYSICIAN TELEPHONE EVALUATION 11-20 MIN: CPT | Performed by: FAMILY MEDICINE

## 2017-07-19 NOTE — MR AVS SNAPSHOT
After Visit Summary   7/19/2017    Radha Fuentes    MRN: [id number]           Patient Information     Date Of Birth          1975        Visit Information        Provider Department      7/19/2017 2:30 PM Sumi Carreon MD [facility]        Today's Diagnoses     Anxiety    -  1       Follow-ups after your visit        Your next 10 appointments already scheduled     Jul 19, 2017  2:30 PM CDT   Telephone Visit with Sumi Carreon MD   [facility] ([facility])    [address] [facility] [address] [phone]           Note: this is not an onsite visit; there is no need to come to the facility.              Who to contact     If you have questions or need follow up information about today's clinic visit or your schedule please contact [facility] directly at [phone].  Normal or non-critical lab and imaging results will be communicated to you by MyChart, letter or phone within 4 business days after the clinic has received the results. If you do not hear from us within 7 days, please contact the clinic through Cooliohart or phone. If you have a critical or abnormal lab result, we will notify you by phone as soon as possible.  Submit refill requests through Disenia or call your pharmacy and they will forward the refill request to us. Please allow 3 business days for your refill to be completed.          Additional Information About Your Visit        MyChart Information     Disenia gives you secure access to your electronic health record. If you see a primary care provider, you can also send messages to your care team and make appointments. If you have questions, please call your primary care clinic.  If you do not have a primary care provider, please call [phone] and they will assist you.        Care EveryWhere ID     This is your Care EveryWhere ID. This could be used by other organizations to access your  [facility] medical records  [id number]         Blood Pressure from Last 3 Encounters:   06/14/17 118/78   05/17/17 138/88   12/23/16 110/80    Weight from Last 3 Encounters:   06/14/17 173 lb (78.5 kg)   05/17/17 184 lb 3 oz (83.5 kg)   12/23/16 193 lb (87.5 kg)              Today, you had the following     No orders found for display         Today's Medication Changes          These changes are accurate as of: 7/19/17  2:28 PM.  If you have any questions, ask your nurse or doctor.               Start taking these medicines.        Dose/Directions    sertraline 50 MG tablet   Commonly known as:  ZOLOFT   Used for:  Anxiety   Started by:  Sumi Carreon MD        Take 1/2 tablet (25 mg) for 1-2 weeks, then increase to 1 tablet orally daily   Quantity:  30 tablet   Refills:  1            Where to get your medicines      These medications were sent to [facility] [address] [place] [address] [id number] [place] [address] AT [address]  [address] [facility] [address]     Phone:  [phone]     sertraline 50 MG tablet                Primary Care Provider Office Phone # Fax #    Sumi Carreon [phone]       [facility] [address] [address] [facility] [address]        Equal Access to Services     RAMIREZ MACKENZIE AH: Hadii saadia ku hadasho Soomaali, waaxda luqadaha, qaybta kaalmada adeegyada, bigg dias haymaritza corral . So [facility] [phone].    ATENCIÓN: Si habla español, tiene a mcfarland disposición servicios gratuitos de asistencia lingüística. Llame al [phone].    We comply with applicable federal civil rights laws and [place] laws. We do not discriminate on the basis of race, color, national origin, age, disability sex, sexual orientation or gender identity.            Thank you!     Thank you for choosing [facility]  for your care. Our goal is always to provide you with excellent care. Hearing back from our patients is one way we can  continue to improve our services. Please take a few minutes to complete the written survey that you may receive in the mail after your visit with us. Thank you!             Your Updated Medication List - Protect others around you: Learn how to safely use, store and throw away your medicines at www.disposemymeds.org.          This list is accurate as of: 7/19/17  2:28 PM.  Always use your most recent med list.                   Brand Name Dispense Instructions for use Diagnosis    hydrOXYzine 25 MG capsule    VISTARIL    30 capsule    Take 1-2 capsules (25-50 mg) by mouth 3 times daily as needed for itching    Anxiety       QUEtiapine 25 MG tablet    SEROQUEL    10 tablet    Take 1-2 tablets (25-50 mg) by mouth daily as needed    Anxiety       sertraline 50 MG tablet    ZOLOFT    30 tablet    Take 1/2 tablet (25 mg) for 1-2 weeks, then increase to 1 tablet orally daily    Anxiety       SUMAtriptan 100 MG tablet    IMITREX    18 tablet    Take 1 tablet (100 mg) by mouth at onset of headache for migraine May repeat in 2 hours. Max 2 tablets/24 hours.    Other migraine without status migrainosus, not intractable       venlafaxine 37.5 MG 24 hr capsule    EFFEXOR-XR    14 capsule    Take 1 capsule daily for 3-5 days, then take 2 capsules daily.    Anxiety       vitamin D 2000 UNITS Caps      Take by mouth daily

## 2017-07-19 NOTE — PROGRESS NOTES
"Medication Followup of Effexor    Taking Medication as prescribed: NO    Side Effects:  Constipation issue with higher dose.     Medication Helping Symptoms:  yes       Radha Fuentes is a 42 year old female who is being evaluated via a telephone visit.      The patient has been notified of following:     \"This telephone visit will be conducted via a call between you and your physician/provider. We have found that certain health care needs can be provided without the need for a physical exam.  This service lets us provide the care you need with a short phone conversation.  If a prescription is necessary we can send it directly to your pharmacy.  If lab work is needed we can place an order for that and you can then stop by our lab to have the test done at a later time.    We will bill your insurance company for this service.  Please check with your medical insurance if this type of visit is covered. You may be responsible for the cost of this type of visit if insurance coverage is denied.  The typical cost is $30 (10min), $59 (11-20min) and $85 (21-30min).  Most often these visits are shorter than 10 minutes.    If during the course of the call the physician/provider feels a telephone visit is not appropriate, you will not be charged for this service.\"       Consent has been obtained for this service by 2 care team members: yes. See the scanned image in the medical record.    Radha Fuentes complains of  Recheck Medication (effexor)      I have reviewed and updated the patient's Past Medical History, Social History, Family History and Medication List.    ALLERGIES  Review of patient's allergies indicates no known allergies.    Zach Frias CMA       (MA signature)    Additional provider notes: Reports Effexor worked well for anxiety symptoms but is having trouble with constipation. Has looked up alternatives. She found that sertraline tends to be less binding. Would like to try it.     Assessment/Plan:  1. Anxiety " - Stop effexor and replace with sertraline. Discussed risk for GI side effects, weight gain. Suggested follow up in 1 month - can send QRGL message or e-visit to review meds.   - sertraline (ZOLOFT) 50 MG tablet; Take 1/2 tablet (25 mg) for 1-2 weeks, then increase to 1 tablet orally daily  Dispense: 30 tablet; Refill: 1      I have reviewed the note as documented above.  This accurately captures the substance of my conversation with the patient    Total time of call between patient and provider was 11 minutes

## 2017-08-02 ENCOUNTER — MYC MEDICAL ADVICE (OUTPATIENT)
Dept: FAMILY MEDICINE | Facility: CLINIC | Age: 42
End: 2017-08-02

## 2017-08-02 DIAGNOSIS — G43.809 OTHER MIGRAINE WITHOUT STATUS MIGRAINOSUS, NOT INTRACTABLE: Primary | ICD-10-CM

## 2017-08-03 ENCOUNTER — MYC MEDICAL ADVICE (OUTPATIENT)
Dept: FAMILY MEDICINE | Facility: CLINIC | Age: 42
End: 2017-08-03

## 2017-08-03 DIAGNOSIS — G43.809 OTHER MIGRAINE WITHOUT STATUS MIGRAINOSUS, NOT INTRACTABLE: ICD-10-CM

## 2017-08-03 RX ORDER — NARATRIPTAN 2.5 MG/1
2.5 TABLET ORAL
Qty: 27 TABLET | Refills: 1 | Status: ON HOLD | OUTPATIENT
Start: 2017-08-03 | End: 2018-08-12

## 2017-08-03 RX ORDER — SUMATRIPTAN 100 MG/1
100 TABLET, FILM COATED ORAL
Qty: 18 TABLET | Refills: 3 | Status: SHIPPED | OUTPATIENT
Start: 2017-08-03 | End: 2018-06-08

## 2017-08-03 NOTE — TELEPHONE ENCOUNTER
Pharmacy calling very confused on what medication patient is suppose to have.  The amerge or imitrex.  They state PCP called and cancelled one order but then sent in amerge and cancelled another order.      Please clarify and sent in what script patient is suppose to be taking    Tyler Perez RN, BSN

## 2017-08-03 NOTE — TELEPHONE ENCOUNTER
Ro from [facility] calling again to check on status.  Informed her that provider is in with patients and message was already sent to provider but Ro is very insistent that this get done now as patient is waiting.  Informed her that we will do our best but provider is in clinic with patient's    Tyler Perez RN, BSN

## 2017-08-08 ENCOUNTER — TRANSFERRED RECORDS (OUTPATIENT)
Dept: HEALTH INFORMATION MANAGEMENT | Facility: CLINIC | Age: 42
End: 2017-08-08

## 2017-08-20 ENCOUNTER — MYC MEDICAL ADVICE (OUTPATIENT)
Dept: FAMILY MEDICINE | Facility: CLINIC | Age: 42
End: 2017-08-20

## 2017-08-20 DIAGNOSIS — F41.9 ANXIETY: ICD-10-CM

## 2017-09-08 ENCOUNTER — TRANSFERRED RECORDS (OUTPATIENT)
Dept: HEALTH INFORMATION MANAGEMENT | Facility: CLINIC | Age: 42
End: 2017-09-08

## 2017-09-25 ENCOUNTER — MYC MEDICAL ADVICE (OUTPATIENT)
Dept: FAMILY MEDICINE | Facility: CLINIC | Age: 42
End: 2017-09-25

## 2017-09-25 DIAGNOSIS — F41.9 ANXIETY: Primary | ICD-10-CM

## 2017-09-27 ENCOUNTER — TRANSFERRED RECORDS (OUTPATIENT)
Dept: HEALTH INFORMATION MANAGEMENT | Facility: CLINIC | Age: 42
End: 2017-09-27

## 2017-11-22 ENCOUNTER — TRANSFERRED RECORDS (OUTPATIENT)
Dept: HEALTH INFORMATION MANAGEMENT | Facility: CLINIC | Age: 42
End: 2017-11-22

## 2017-12-05 ENCOUNTER — TRANSFERRED RECORDS (OUTPATIENT)
Dept: HEALTH INFORMATION MANAGEMENT | Facility: CLINIC | Age: 42
End: 2017-12-05

## 2017-12-20 ENCOUNTER — TRANSFERRED RECORDS (OUTPATIENT)
Dept: HEALTH INFORMATION MANAGEMENT | Facility: CLINIC | Age: 42
End: 2017-12-20

## 2018-01-30 ENCOUNTER — OFFICE VISIT (OUTPATIENT)
Dept: FAMILY MEDICINE | Facility: CLINIC | Age: 43
End: 2018-01-30
Payer: COMMERCIAL

## 2018-01-30 VITALS
BODY MASS INDEX: 24.14 KG/M2 | SYSTOLIC BLOOD PRESSURE: 130 MMHG | TEMPERATURE: 98.1 F | HEART RATE: 60 BPM | HEIGHT: 69 IN | WEIGHT: 163 LBS | DIASTOLIC BLOOD PRESSURE: 98 MMHG

## 2018-01-30 DIAGNOSIS — R10.31 RLQ ABDOMINAL PAIN: ICD-10-CM

## 2018-01-30 DIAGNOSIS — M54.50 ACUTE BILATERAL LOW BACK PAIN WITHOUT SCIATICA: Primary | ICD-10-CM

## 2018-01-30 LAB
ALBUMIN UR-MCNC: NEGATIVE MG/DL
APPEARANCE UR: CLEAR
BASOPHILS # BLD AUTO: 0 10E9/L (ref 0–0.2)
BASOPHILS NFR BLD AUTO: 0.6 %
BILIRUB UR QL STRIP: NEGATIVE
COLOR UR AUTO: YELLOW
DIFFERENTIAL METHOD BLD: NORMAL
EOSINOPHIL # BLD AUTO: 0.1 10E9/L (ref 0–0.7)
EOSINOPHIL NFR BLD AUTO: 0.9 %
ERYTHROCYTE [DISTWIDTH] IN BLOOD BY AUTOMATED COUNT: 13.3 % (ref 10–15)
GLUCOSE UR STRIP-MCNC: NEGATIVE MG/DL
HCT VFR BLD AUTO: 41.9 % (ref 35–47)
HGB BLD-MCNC: 13.6 G/DL (ref 11.7–15.7)
HGB UR QL STRIP: NEGATIVE
KETONES UR STRIP-MCNC: NEGATIVE MG/DL
LEUKOCYTE ESTERASE UR QL STRIP: NEGATIVE
LYMPHOCYTES # BLD AUTO: 1.8 10E9/L (ref 0.8–5.3)
LYMPHOCYTES NFR BLD AUTO: 33.1 %
MCH RBC QN AUTO: 29.6 PG (ref 26.5–33)
MCHC RBC AUTO-ENTMCNC: 32.5 G/DL (ref 31.5–36.5)
MCV RBC AUTO: 91 FL (ref 78–100)
MONOCYTES # BLD AUTO: 0.6 10E9/L (ref 0–1.3)
MONOCYTES NFR BLD AUTO: 10.9 %
NEUTROPHILS # BLD AUTO: 2.9 10E9/L (ref 1.6–8.3)
NEUTROPHILS NFR BLD AUTO: 54.5 %
NITRATE UR QL: NEGATIVE
PH UR STRIP: 7.5 PH (ref 5–7)
PLATELET # BLD AUTO: 319 10E9/L (ref 150–450)
RBC # BLD AUTO: 4.59 10E12/L (ref 3.8–5.2)
SOURCE: ABNORMAL
SP GR UR STRIP: 1.01 (ref 1–1.03)
UROBILINOGEN UR STRIP-ACNC: 0.2 EU/DL (ref 0.2–1)
WBC # BLD AUTO: 5.4 10E9/L (ref 4–11)

## 2018-01-30 PROCEDURE — 36415 COLL VENOUS BLD VENIPUNCTURE: CPT | Performed by: FAMILY MEDICINE

## 2018-01-30 PROCEDURE — 81003 URINALYSIS AUTO W/O SCOPE: CPT | Performed by: FAMILY MEDICINE

## 2018-01-30 PROCEDURE — 85025 COMPLETE CBC W/AUTO DIFF WBC: CPT | Performed by: FAMILY MEDICINE

## 2018-01-30 PROCEDURE — 99214 OFFICE O/P EST MOD 30 MIN: CPT | Performed by: FAMILY MEDICINE

## 2018-01-30 NOTE — NURSING NOTE
"Chief Complaint   Patient presents with     Back Pain       Initial BP (!) 130/98 (BP Location: Right arm, Patient Position: Chair, Cuff Size: Adult Regular)  Pulse 60  Temp 98.1  F (36.7  C) (Oral)  Ht 5' 8.75\" (1.746 m)  Wt 163 lb (73.9 kg)  Breastfeeding? No  BMI 24.25 kg/m2 Estimated body mass index is 24.25 kg/(m^2) as calculated from the following:    Height as of this encounter: 5' 8.75\" (1.746 m).    Weight as of this encounter: 163 lb (73.9 kg).  Medication Reconciliation: complete     Zach Frias CMA          "

## 2018-01-30 NOTE — MR AVS SNAPSHOT
"              After Visit Summary   1/30/2018    Radha Fuentes    MRN: [id number]           Patient Information     Date Of Birth          1975        Visit Information        Provider Department      1/30/2018 3:40 PM Sumi Carreon MD [facility]        Today's Diagnoses     Acute bilateral low back pain without sciatica    -  1    RLQ abdominal pain           Follow-ups after your visit        Who to contact     If you have questions or need follow up information about today's clinic visit or your schedule please contact [facility] directly at [phone].  Normal or non-critical lab and imaging results will be communicated to you by [employer] Stockhart, letter or phone within 4 business days after the clinic has received the results. If you do not hear from us within 7 days, please contact the clinic through Temnost or phone. If you have a critical or abnormal lab result, we will notify you by phone as soon as possible.  Submit refill requests through [employer] or call your pharmacy and they will forward the refill request to us. Please allow 3 business days for your refill to be completed.          Additional Information About Your Visit        MyChart Information     [employer] gives you secure access to your electronic health record. If you see a primary care provider, you can also send messages to your care team and make appointments. If you have questions, please call your primary care clinic.  If you do not have a primary care provider, please call [phone] and they will assist you.        Care EveryWhere ID     This is your Care EveryWhere ID. This could be used by other organizations to access your [facility] medical records  [id number]        Your Vitals Were     Pulse Temperature Height Breastfeeding? BMI (Body Mass Index)       60 98.1  F (36.7  C) (Oral) 5' 8.75\" (1.746 m) No 24.25 kg/m2        Blood Pressure from Last 3 Encounters:   01/30/18 (!) 130/98   06/14/17 " 118/78   05/17/17 138/88    Weight from Last 3 Encounters:   01/30/18 163 lb (73.9 kg)   06/14/17 173 lb (78.5 kg)   05/17/17 184 lb 3 oz (83.5 kg)              We Performed the Following     CBC with platelets and differential     UA reflex to Microscopic and Culture          Today's Medication Changes          These changes are accurate as of 1/30/18  4:19 PM.  If you have any questions, ask your nurse or doctor.               Stop taking these medicines if you haven't already. Please contact your care team if you have questions.     hydrOXYzine 25 MG capsule   Commonly known as:  VISTARIL   Stopped by:  Sumi Carreon MD           QUEtiapine 25 MG tablet   Commonly known as:  SEROQUEL   Stopped by:  Sumi Carreon MD           venlafaxine 37.5 MG 24 hr capsule   Commonly known as:  EFFEXOR-XR   Stopped by:  Sumi Carreon MD                    Primary Care Provider Office Phone # Fax #    Sumi Carreon [phone] [address] [facility] [address]        Equal Access to Services     [facility]: Hadii aad ku hadasho Soomaali, waaxda luqadaha, qaybta kaalmada adeegyada, bigg dias hayjennifern freda corral . So [facility] [phone].    ATENCIÓN: Si habla español, tiene a mcfarland disposición servicios gratuitos de asistencia lingüística. Llame al [phone].    We comply with applicable federal civil rights laws and [place] laws. We do not discriminate on the basis of race, color, national origin, age, disability, sex, sexual orientation, or gender identity.            Thank you!     Thank you for choosing [facility]  for your care. Our goal is always to provide you with excellent care. Hearing back from our patients is one way we can continue to improve our services. Please take a few minutes to complete the written survey that you may receive in the mail after your visit with us. Thank you!             Your Updated Medication List - Protect  others around you: Learn how to safely use, store and throw away your medicines at www.disposemymeds.org.          This list is accurate as of 1/30/18  4:19 PM.  Always use your most recent med list.                   Brand Name Dispense Instructions for use Diagnosis    metFORMIN 1000 MG tablet    GLUCOPHAGE     Take by mouth 2 times daily (with meals)    Acute bilateral low back pain without sciatica       naratriptan 2.5 MG tablet    AMERGE    27 tablet    Take 1 tablet (2.5 mg) by mouth at onset of headache for migraine May repeat in 4 hours. Max 2 tablets/24 hours.    Other migraine without status migrainosus, not intractable       sertraline 50 MG tablet    ZOLOFT    135 tablet    Take 1.5 tablets (75 mg) by mouth daily    Anxiety       SUMAtriptan 100 MG tablet    IMITREX    18 tablet    Take 1 tablet (100 mg) by mouth at onset of headache for migraine May repeat in 2 hours. Max 2 tablets/24 hours.    Other migraine without status migrainosus, not intractable       vitamin D 2000 UNITS Caps      Take by mouth daily

## 2018-01-30 NOTE — PROGRESS NOTES
SUBJECTIVE:   Radha Fuentes is a 42 year old female who presents to clinic today for the following health issues:      Back Pain       Duration: 3 weeks        Specific cause: none    Description:   Location of pain: low back right  Character of pain: dull ache  Pain radiation:none  New numbness or weakness in legs, not attributed to pain:  no     Intensity: Currently 6/10, At its worst 8/10    History:   Pain interferes with job: YES,   History of back problems: no prior back problems  Any previous MRI or X-rays: None  Sees a specialist for back pain:  No  Therapies tried without relief: cold, heat, massage, muscle relaxants and NSAIDs    Alleviating factors:   Improved by: none      Precipitating factors:  Worsened by: Lifting, Bending, Standing and breathing      No recent change in physical activity. No new lifting or bending activities. No repetitive movements.     No radiating symptoms down the legs. Does radiate to the RLQ abdomen.     Movement exacerbates the pain. Stepping down worsens the pain.   Sitting away from the pain improves the pain.     Treated for a UTI 5 days ago, her dysuria has fully resolved.    Daily, formed brown BM. Normal periods. No fevers.     Hx of cholecystectomy.        Problem list and histories reviewed & adjusted, as indicated.  Additional history: as documented    Patient Active Problem List   Diagnosis     PCOS (polycystic ovarian syndrome)     Family history of aortic aneurysm- dad with marfan     Other migraine without status migrainosus, not intractable     H/O LEEP     Anxiety     Past Surgical History:   Procedure Laterality Date     ARTHROSCOPY KNEE  1991    left     Breast reconstruction with implants  1998     CHOLECYSTECTOMY  2010     TONSILLECTOMY  1996       Social History   Substance Use Topics     Smoking status: Former Smoker     Smokeless tobacco: Never Used     Alcohol use No     Family History   Problem Relation Age of Onset     HEART DISEASE Mother       "mitral valve replacement at age 64     Aneurysm Mother            Reviewed and updated as needed this visit by clinical staff  Allergies       Reviewed and updated as needed this visit by Provider         ROS:  Constitutional, HEENT, cardiovascular, pulmonary, gi and gu systems are negative, except as otherwise noted.    OBJECTIVE:     BP (!) 130/98 (BP Location: Right arm, Patient Position: Chair, Cuff Size: Adult Regular)  Pulse 60  Temp 98.1  F (36.7  C) (Oral)  Ht 5' 8.75\" (1.746 m)  Wt 163 lb (73.9 kg)  Breastfeeding? No  BMI 24.25 kg/m2  Body mass index is 24.25 kg/(m^2).  GENERAL: healthy, alert and no distress  ABDOMEN: ttp RLQ, no rebound or guarding, soft, normal BS, no masses  BACK: no CVA tenderness, ttp right iliac crest, lumbar paraspinals on the right in spasm    Diagnostic Test Results:  Results for orders placed or performed in visit on 01/30/18 (from the past 24 hour(s))   UA reflex to Microscopic and Culture   Result Value Ref Range    Color Urine Yellow     Appearance Urine Clear     Glucose Urine Negative NEG^Negative mg/dL    Bilirubin Urine Negative NEG^Negative    Ketones Urine Negative NEG^Negative mg/dL    Specific Gravity Urine 1.015 1.003 - 1.035    Blood Urine Negative NEG^Negative    pH Urine 7.5 (H) 5.0 - 7.0 pH    Protein Albumin Urine Negative NEG^Negative mg/dL    Urobilinogen Urine 0.2 0.2 - 1.0 EU/dL    Nitrite Urine Negative NEG^Negative    Leukocyte Esterase Urine Negative NEG^Negative    Source Midstream Urine        ASSESSMENT/PLAN:     1. Acute bilateral low back pain without sciatica - negative UA today, with RLQ confounding, suggested CBC with diff to assess for appy, also discussed ovarian cyst can cause pain in pelvis and radiate to the back. Will order ultrasound if CBC negative. If all negative, will treat as MSK.   - UA reflex to Microscopic and Culture    2. RLQ abdominal pain - as above  - CBC with platelets and differential        Sumi Carreon, " MD  [facility]

## 2018-02-09 ENCOUNTER — TRANSFERRED RECORDS (OUTPATIENT)
Dept: HEALTH INFORMATION MANAGEMENT | Facility: CLINIC | Age: 43
End: 2018-02-09

## 2018-02-26 ENCOUNTER — TELEPHONE (OUTPATIENT)
Dept: FAMILY MEDICINE | Facility: CLINIC | Age: 43
End: 2018-02-26

## 2018-02-26 NOTE — TELEPHONE ENCOUNTER
Pt's  called in to report the Pt developed ABD pain that started yesterday alongside fever and generally feeling unwell.   I attempted to get more information from him (onset, location, severity, other symptoms) and he was unable to provide this information as the Pt is not currently with him.   Advised  that I need this information to determine if the Pt should come into urgent care, or if ER is more appropriate (concern lies with acute ABD pain with fever)   advised me to call the Pt directly to get accurate information.     Called the Pt, no answer, Left message. Waiting callback.  Called  back to let him know I couldn't get a hold of the Pt, no answer, voicemail box was full so not able to leave mesasage.     Please route to Triage if the Pt or  calls back.     Michell Luis RN -- [facility]

## 2018-02-27 NOTE — TELEPHONE ENCOUNTER
Closing encounter. In previous voicemail advised callback if needed.     Michell Luis RN -- [facility]

## 2018-02-27 NOTE — TELEPHONE ENCOUNTER
Called Pt again this AM. No answer, left another message advising callback.   Also called  Cristian again and unable to leave message due to mailbox being full still.     Michell Luis RN -- [facility]

## 2018-03-01 ENCOUNTER — OFFICE VISIT (OUTPATIENT)
Dept: FAMILY MEDICINE | Facility: CLINIC | Age: 43
End: 2018-03-01
Payer: COMMERCIAL

## 2018-03-01 VITALS
HEART RATE: 80 BPM | BODY MASS INDEX: 24.29 KG/M2 | HEIGHT: 69 IN | TEMPERATURE: 98.6 F | DIASTOLIC BLOOD PRESSURE: 90 MMHG | SYSTOLIC BLOOD PRESSURE: 146 MMHG | WEIGHT: 164 LBS

## 2018-03-01 DIAGNOSIS — R10.11 RUQ ABDOMINAL PAIN: Primary | ICD-10-CM

## 2018-03-01 DIAGNOSIS — L01.00 IMPETIGO: ICD-10-CM

## 2018-03-01 DIAGNOSIS — L30.9 DERMATITIS: ICD-10-CM

## 2018-03-01 LAB
ALBUMIN SERPL-MCNC: 3.7 G/DL (ref 3.4–5)
ALP SERPL-CCNC: 54 U/L (ref 40–150)
ALT SERPL W P-5'-P-CCNC: 17 U/L (ref 0–50)
AST SERPL W P-5'-P-CCNC: 19 U/L (ref 0–45)
BASOPHILS # BLD AUTO: 0 10E9/L (ref 0–0.2)
BASOPHILS NFR BLD AUTO: 0.5 %
BILIRUB DIRECT SERPL-MCNC: <0.1 MG/DL (ref 0–0.2)
BILIRUB SERPL-MCNC: 0.2 MG/DL (ref 0.2–1.3)
DIFFERENTIAL METHOD BLD: NORMAL
EOSINOPHIL # BLD AUTO: 0 10E9/L (ref 0–0.7)
EOSINOPHIL NFR BLD AUTO: 0.7 %
ERYTHROCYTE [DISTWIDTH] IN BLOOD BY AUTOMATED COUNT: 12.5 % (ref 10–15)
HCT VFR BLD AUTO: 42.6 % (ref 35–47)
HGB BLD-MCNC: 13.8 G/DL (ref 11.7–15.7)
LIPASE SERPL-CCNC: 169 U/L (ref 73–393)
LYMPHOCYTES # BLD AUTO: 1.6 10E9/L (ref 0.8–5.3)
LYMPHOCYTES NFR BLD AUTO: 37.3 %
MCH RBC QN AUTO: 29.6 PG (ref 26.5–33)
MCHC RBC AUTO-ENTMCNC: 32.4 G/DL (ref 31.5–36.5)
MCV RBC AUTO: 91 FL (ref 78–100)
MONOCYTES # BLD AUTO: 0.5 10E9/L (ref 0–1.3)
MONOCYTES NFR BLD AUTO: 10.8 %
NEUTROPHILS # BLD AUTO: 2.2 10E9/L (ref 1.6–8.3)
NEUTROPHILS NFR BLD AUTO: 50.7 %
PLATELET # BLD AUTO: 288 10E9/L (ref 150–450)
PROT SERPL-MCNC: 7.3 G/DL (ref 6.8–8.8)
RBC # BLD AUTO: 4.67 10E12/L (ref 3.8–5.2)
WBC # BLD AUTO: 4.2 10E9/L (ref 4–11)

## 2018-03-01 PROCEDURE — 80076 HEPATIC FUNCTION PANEL: CPT | Performed by: FAMILY MEDICINE

## 2018-03-01 PROCEDURE — 83690 ASSAY OF LIPASE: CPT | Performed by: FAMILY MEDICINE

## 2018-03-01 PROCEDURE — 99214 OFFICE O/P EST MOD 30 MIN: CPT | Performed by: FAMILY MEDICINE

## 2018-03-01 PROCEDURE — 85025 COMPLETE CBC W/AUTO DIFF WBC: CPT | Performed by: FAMILY MEDICINE

## 2018-03-01 PROCEDURE — 36415 COLL VENOUS BLD VENIPUNCTURE: CPT | Performed by: FAMILY MEDICINE

## 2018-03-01 RX ORDER — MUPIROCIN 20 MG/G
OINTMENT TOPICAL 3 TIMES DAILY
Qty: 22 G | Refills: 0 | Status: SHIPPED | OUTPATIENT
Start: 2018-03-01 | End: 2018-03-06

## 2018-03-01 RX ORDER — TRIAMCINOLONE ACETONIDE 1 MG/G
CREAM TOPICAL
Qty: 15 G | Refills: 0 | Status: SHIPPED | OUTPATIENT
Start: 2018-03-01 | End: 2018-08-09

## 2018-03-01 NOTE — NURSING NOTE
"Chief Complaint   Patient presents with     Abdominal Pain       Initial /90 (BP Location: Right arm, Patient Position: Sitting, Cuff Size: Adult Regular)  Pulse 80  Temp 98.6  F (37  C) (Oral)  Ht 5' 8.75\" (1.746 m)  Wt 164 lb (74.4 kg)  Breastfeeding? No  BMI 24.4 kg/m2 Estimated body mass index is 24.4 kg/(m^2) as calculated from the following:    Height as of this encounter: 5' 8.75\" (1.746 m).    Weight as of this encounter: 164 lb (74.4 kg).  Medication Reconciliation: complete     Zach Frias CMA          "

## 2018-03-01 NOTE — PROGRESS NOTES
SUBJECTIVE:   Radha Fuentes is a 42 year old female who presents to clinic today for the following health issues:      Abdominal Pain      Duration: off and on for years. Gall bladder out 2009. fair up about 2 weeks ago. Last flair 4 days ago.     Description (location/character/radiation): center abdomen       Associated flank pain: some pain to back and shoulder at times    Intensity:  severe    Accompanying signs and symptoms:        Fever/Chills: YES at times fever and chills       Gas/Bloating: no        Nausea/vomitting: YES- nausea       Diarrhea: no        Dysuria or Hematuria: no     History (previous similar pain/trauma/previous testing): past issues    Precipitating or alleviating factors:       Pain worse with eating/BM/urination: eating some times       Pain relieved by BM: no     Therapies tried and outcome: muscle relaxer helps during flair up    LMP:  14 days ago      Reports intermittent sharp stabbing pains in the RUQ, similar to when she had cholecystitis in 2009, occurring more frequently lately. S/p cholecystectomy in 2009. Had been doing well with no abdominal pain until 2 occasions when she was given narcotic pain medication and had sharp RUQ abd pain.     Has not had any narcotic pain medications.   Did read that this type of pain can be alleviated with mm relaxers, so she tried this. Notes resolution of her abd pain. Otherwise, pain subsided on it's own within hours after onset.   Eats a clean diet. Doesn't eat high fatty foods.     No change in stooling.   Does not report heartburn. Feel pain is much more severe than indigestion, which she has had in the past.       Sore on the nose - present for the past week, has tried triple abx ointment with some improvement.     Rash on the upper left abdomen - seems to be getting better but still present. No new body products. Not itchy.       Problem list and histories reviewed & adjusted, as indicated.  Additional history: none    Patient  "Active Problem List   Diagnosis     PCOS (polycystic ovarian syndrome)     Family history of aortic aneurysm- dad with marfan     Other migraine without status migrainosus, not intractable     H/O LEEP     Anxiety     Past Surgical History:   Procedure Laterality Date     ARTHROSCOPY KNEE  1991    left     Breast reconstruction with implants  1998     CHOLECYSTECTOMY  2010     TONSILLECTOMY  1996       Social History   Substance Use Topics     Smoking status: Former Smoker     Smokeless tobacco: Never Used     Alcohol use No     Family History   Problem Relation Age of Onset     HEART DISEASE Mother      mitral valve replacement at age 64     Aneurysm Mother            Reviewed and updated as needed this visit by clinical staff       Reviewed and updated as needed this visit by Provider         ROS:  Constitutional, HEENT, cardiovascular, pulmonary, gi and gu systems are negative, except as otherwise noted.    OBJECTIVE:     /90 (BP Location: Right arm, Patient Position: Sitting, Cuff Size: Adult Regular)  Pulse 80  Temp 98.6  F (37  C) (Oral)  Ht 5' 8.75\" (1.746 m)  Wt 164 lb (74.4 kg)  Breastfeeding? No  BMI 24.4 kg/m2  Body mass index is 24.4 kg/(m^2).  GENERAL: healthy, alert and no distress  ABDOMEN: soft, nontender, no hepatosplenomegaly, no masses and bowel sounds normal  SKIN: 0.4 cm scab on the right nare, small yellow crusting overlying, large erythematous rash beneath the left breast  PSYCH: mentation appears normal, affect normal/bright    Diagnostic Test Results:  none     ASSESSMENT/PLAN:     1. RUQ abdominal pain - discussed may be retained gallstone versus sludge versus SOD versus pancreatitis. Will get lab work to start. Advised bland diet.   - Hepatic panel (Albumin, ALT, AST, Bili, Alk Phos, TP)  - Lipase  - CBC with platelets and differential    2. Impetigo - suggested good staph antibiotic to treat  - mupirocin (BACTROBAN) 2 % ointment; Apply topically 3 times daily for 5 days  " Dispense: 22 g; Refill: 0    3. Dermatitis - will trial topical steroid to help with inflammation  - triamcinolone (KENALOG) 0.1 % cream; Apply sparingly to affected area three times daily for 14 days.  Dispense: 15 g; Refill: 0    25 minutes spent with patient face-to-face, > 50% in counseling and coordination of care regarding the issues addressed in the assessment and plan.           Sumi Carreon MD  [facility]

## 2018-03-01 NOTE — MR AVS SNAPSHOT
"              After Visit Summary   3/1/2018    Radha Fuentes    MRN: [id number]           Patient Information     Date Of Birth          1975        Visit Information        Provider Department      3/1/2018 1:00 PM Sumi Carreon MD [facility]        Today's Diagnoses     RUQ abdominal pain    -  1    Impetigo        Dermatitis           Follow-ups after your visit        Who to contact     If you have questions or need follow up information about today's clinic visit or your schedule please contact [facility] directly at [phone].  Normal or non-critical lab and imaging results will be communicated to you by [email or web address], letter or phone within 4 business days after the clinic has received the results. If you do not hear from us within 7 days, please contact the clinic through [email or web address] or phone. If you have a critical or abnormal lab result, we will notify you by phone as soon as possible.  Submit refill requests through [employer] or call your pharmacy and they will forward the refill request to us. Please allow 3 business days for your refill to be completed.          Additional Information About Your Visit        MyChart Information     [employer] gives you secure access to your electronic health record. If you see a primary care provider, you can also send messages to your care team and make appointments. If you have questions, please call your primary care clinic.  If you do not have a primary care provider, please call [phone] and they will assist you.        Care EveryWhere ID     This is your Care EveryWhere ID. This could be used by other organizations to access your [place] medical records  [id number]        Your Vitals Were     Pulse Temperature Height Breastfeeding? BMI (Body Mass Index)       80 98.6  F (37  C) (Oral) 5' 8.75\" (1.746 m) No 24.4 kg/m2        Blood Pressure from Last 3 Encounters:   03/01/18 146/90   01/30/18 (!) 130/98   06/14/17 118/78    " Weight from Last 3 Encounters:   03/01/18 164 lb (74.4 kg)   01/30/18 163 lb (73.9 kg)   06/14/17 173 lb (78.5 kg)              We Performed the Following     CBC with platelets and differential     Hepatic panel (Albumin, ALT, AST, Bili, Alk Phos, TP)     Lipase          Today's Medication Changes          These changes are accurate as of 3/1/18  1:36 PM.  If you have any questions, ask your nurse or doctor.               Start taking these medicines.        Dose/Directions    mupirocin 2 % ointment   Commonly known as:  BACTROBAN   Used for:  Impetigo   Started by:  Sumi Carreon MD        Apply topically 3 times daily for 5 days   Quantity:  22 g   Refills:  0       triamcinolone 0.1 % cream   Commonly known as:  KENALOG   Used for:  RUQ abdominal pain   Started by:  Sumi Carreon MD        Apply sparingly to affected area three times daily for 14 days.   Quantity:  15 g   Refills:  0            Where to get your medicines      These medications were sent to [facility] [address] [place] [address] [id number] [place] [address] AT [address]  [address] [facility] [address]     Phone:  [phone]     mupirocin 2 % ointment    triamcinolone 0.1 % cream                Primary Care Provider Office Phone # Fax #    Sumi Carreon [phone] [address] [facility] [address]        Equal Access to Services     SAM MACKENZIE AH: Hadii aad ku hadasho Soomaali, waaxda luqadaha, qaybta kaalmada adeegyada, waxay gwenin haymaritza corral . So [facility] [phone].    ATENCIÓN: Si giola gio, tiene a mcfarland disposición servicios gratuitos de asistencia lingüística. Llame al [phone].    We comply with applicable federal civil rights laws and [place] laws. We do not discriminate on the basis of race, color, national origin, age, disability, sex, sexual orientation, or gender identity.            Thank you!     Thank you for choosing [facility]  for your care. Our goal is always to provide you with excellent care. Hearing back from our patients is one way we can continue to improve our services. Please take a few minutes to complete the written survey that you may receive in the mail after your visit with us. Thank you!             Your Updated Medication List - Protect others around you: Learn how to safely use, store and throw away your medicines at www.disposemymeds.org.          This list is accurate as of 3/1/18  1:36 PM.  Always use your most recent med list.                   Brand Name Dispense Instructions for use Diagnosis    metFORMIN 1000 MG tablet    GLUCOPHAGE     Take by mouth 2 times daily (with meals)    Acute bilateral low back pain without sciatica       mupirocin 2 % ointment    BACTROBAN    22 g    Apply topically 3 times daily for 5 days    Impetigo       naratriptan 2.5 MG tablet    AMERGE    27 tablet    Take 1 tablet (2.5 mg) by mouth at onset of headache for migraine May repeat in 4 hours. Max 2 tablets/24 hours.    Other migraine without status migrainosus, not intractable       sertraline 50 MG tablet    ZOLOFT    135 tablet    Take 1.5 tablets (75 mg) by mouth daily    Anxiety       SUMAtriptan 100 MG tablet    IMITREX    18 tablet    Take 1 tablet (100 mg) by mouth at onset of headache for migraine May repeat in 2 hours. Max 2 tablets/24 hours.    Other migraine without status migrainosus, not intractable       triamcinolone 0.1 % cream    KENALOG    15 g    Apply sparingly to affected area three times daily for 14 days.    RUQ abdominal pain       vitamin D 2000 UNITS Caps      Take by mouth daily

## 2018-03-14 ENCOUNTER — TRANSFERRED RECORDS (OUTPATIENT)
Dept: HEALTH INFORMATION MANAGEMENT | Facility: CLINIC | Age: 43
End: 2018-03-14

## 2018-03-19 ENCOUNTER — HOSPITAL ENCOUNTER (OUTPATIENT)
Dept: NUCLEAR MEDICINE | Facility: CLINIC | Age: 43
Setting detail: NUCLEAR MEDICINE
Discharge: HOME | End: 2018-03-19
Attending: FAMILY MEDICINE | Admitting: FAMILY MEDICINE
Payer: COMMERCIAL

## 2018-03-19 DIAGNOSIS — R10.11 RUQ ABDOMINAL PAIN: ICD-10-CM

## 2018-03-19 PROCEDURE — 34300033 ZZH RX 343: Performed by: FAMILY MEDICINE

## 2018-03-19 PROCEDURE — A9537 TC99M MEBROFENIN: HCPCS | Performed by: FAMILY MEDICINE

## 2018-03-19 PROCEDURE — 78226 HEPATOBILIARY SYSTEM IMAGING: CPT

## 2018-03-19 RX ORDER — KIT FOR THE PREPARATION OF TECHNETIUM TC 99M MEBROFENIN 45 MG/10ML
6 INJECTION, POWDER, LYOPHILIZED, FOR SOLUTION INTRAVENOUS ONCE
Status: COMPLETED | OUTPATIENT
Start: 2018-03-19 | End: 2018-03-19

## 2018-03-19 RX ADMIN — MEBROFENIN 6 MCI.: 45 INJECTION, POWDER, LYOPHILIZED, FOR SOLUTION INTRAVENOUS at 13:40

## 2018-03-30 DIAGNOSIS — F41.9 ANXIETY: ICD-10-CM

## 2018-03-30 NOTE — TELEPHONE ENCOUNTER
"Requested Prescriptions   Pending Prescriptions Disp Refills     sertraline (ZOLOFT) 50 MG tablet 135 tablet 1    Last Written Prescription Date:  09/26/2017  Last Fill Quantity: 135 tablet,  # refills: 1   Last office visit: 3/1/2018 with prescribing provider:  03/01/2018   Future Office Visit:     Sig: Take 1.5 tablets (75 mg) by mouth daily    SSRIs Protocol Passed    3/30/2018  7:19 AM       Passed - Recent (12 mo) or future (30 days) visit within the authorizing provider's specialty    Patient had office visit in the last 12 months or has a visit in the next 30 days with authorizing provider or within the authorizing provider's specialty.  See \"Patient Info\" tab in inbasket, or \"Choose Columns\" in Meds & Orders section of the refill encounter.           Passed - Patient is age 18 or older       Passed - No active pregnancy on record       Passed - No positive pregnancy test in last 12 months          Davon MALIKT  "

## 2018-06-06 ENCOUNTER — TRANSFERRED RECORDS (OUTPATIENT)
Dept: HEALTH INFORMATION MANAGEMENT | Facility: CLINIC | Age: 43
End: 2018-06-06

## 2018-06-08 DIAGNOSIS — G43.809 OTHER MIGRAINE WITHOUT STATUS MIGRAINOSUS, NOT INTRACTABLE: ICD-10-CM

## 2018-06-08 RX ORDER — SUMATRIPTAN 100 MG/1
100 TABLET, FILM COATED ORAL
Qty: 18 TABLET | Refills: 3 | Status: ON HOLD | OUTPATIENT
Start: 2018-06-08 | End: 2018-08-12

## 2018-06-08 NOTE — TELEPHONE ENCOUNTER
"Requested Prescriptions   Pending Prescriptions Disp Refills     SUMAtriptan (IMITREX) 100 MG tablet 18 tablet 3    Last Written Prescription Date:  08/03/2017  Last Fill Quantity: 18 tablet,  # refills: 3   Last office visit: 3/1/2018 with prescribing provider:  03/01/2018   Future Office Visit:     Sig: Take 1 tablet (100 mg) by mouth at onset of headache for migraine May repeat in 2 hours. Max 2 tablets/24 hours.    Serotonin Agonists Failed    6/8/2018  2:25 PM       Failed - Blood pressure under 140/90 in past 12 months    BP Readings from Last 3 Encounters:   03/01/18 146/90   01/30/18 (!) 130/98   06/14/17 118/78                Failed - Serotonin Agonist request needs review.    Please review patient's record. If patient has had 8 or more treatments in the past month, please forward to provider.         Passed - Recent (12 mo) or future (30 days) visit within the authorizing provider's specialty    Patient had office visit in the last 12 months or has a visit in the next 30 days with authorizing provider or within the authorizing provider's specialty.  See \"Patient Info\" tab in inbasket, or \"Choose Columns\" in Meds & Orders section of the refill encounter.           Passed - Patient is age 18 or older       Passed - No active pregnancy on record       Passed - No positive pregnancy test in past 12 months          Davon MALIKT  "

## 2018-06-08 NOTE — TELEPHONE ENCOUNTER
Routing refill request to provider for review/approval because:  Labs out of range:  BP  Tyler Perez RN, BSN

## 2018-08-02 ENCOUNTER — TRANSFERRED RECORDS (OUTPATIENT)
Dept: HEALTH INFORMATION MANAGEMENT | Facility: CLINIC | Age: 43
End: 2018-08-02

## 2018-08-07 ENCOUNTER — TRANSFERRED RECORDS (OUTPATIENT)
Dept: HEALTH INFORMATION MANAGEMENT | Facility: CLINIC | Age: 43
End: 2018-08-07

## 2018-08-08 ENCOUNTER — TRANSFERRED RECORDS (OUTPATIENT)
Dept: HEALTH INFORMATION MANAGEMENT | Facility: CLINIC | Age: 43
End: 2018-08-08

## 2018-08-09 ENCOUNTER — HOSPITAL ENCOUNTER (EMERGENCY)
Facility: CLINIC | Age: 43
Discharge: HOME | End: 2018-08-09
Attending: EMERGENCY MEDICINE | Admitting: EMERGENCY MEDICINE
Payer: COMMERCIAL

## 2018-08-09 ENCOUNTER — APPOINTMENT (OUTPATIENT)
Dept: MRI IMAGING | Facility: CLINIC | Age: 43
End: 2018-08-09
Attending: EMERGENCY MEDICINE
Payer: COMMERCIAL

## 2018-08-09 VITALS
WEIGHT: 162 LBS | HEIGHT: 70 IN | BODY MASS INDEX: 23.19 KG/M2 | OXYGEN SATURATION: 97 % | SYSTOLIC BLOOD PRESSURE: 152 MMHG | RESPIRATION RATE: 12 BRPM | DIASTOLIC BLOOD PRESSURE: 99 MMHG | TEMPERATURE: 98.5 F

## 2018-08-09 DIAGNOSIS — I77.74 VERTEBRAL ARTERY DISSECTION (H): ICD-10-CM

## 2018-08-09 LAB
ANION GAP SERPL CALCULATED.3IONS-SCNC: 7 MMOL/L (ref 3–14)
BASOPHILS # BLD AUTO: 0 10E9/L (ref 0–0.2)
BASOPHILS NFR BLD AUTO: 0.4 %
BUN SERPL-MCNC: 16 MG/DL (ref 7–30)
CALCIUM SERPL-MCNC: 9.3 MG/DL (ref 8.5–10.1)
CHLORIDE SERPL-SCNC: 103 MMOL/L (ref 94–109)
CO2 SERPL-SCNC: 27 MMOL/L (ref 20–32)
CREAT SERPL-MCNC: 0.6 MG/DL (ref 0.52–1.04)
DIFFERENTIAL METHOD BLD: NORMAL
EOSINOPHIL # BLD AUTO: 0 10E9/L (ref 0–0.7)
EOSINOPHIL NFR BLD AUTO: 0.2 %
ERYTHROCYTE [DISTWIDTH] IN BLOOD BY AUTOMATED COUNT: 13.3 % (ref 10–15)
GFR SERPL CREATININE-BSD FRML MDRD: >90 ML/MIN/1.7M2
GLUCOSE SERPL-MCNC: 80 MG/DL (ref 70–99)
HCT VFR BLD AUTO: 41.5 % (ref 35–47)
HGB BLD-MCNC: 13.6 G/DL (ref 11.7–15.7)
IMM GRANULOCYTES # BLD: 0 10E9/L (ref 0–0.4)
IMM GRANULOCYTES NFR BLD: 0.2 %
INR PPP: 0.86 (ref 0.86–1.14)
LYMPHOCYTES # BLD AUTO: 1.5 10E9/L (ref 0.8–5.3)
LYMPHOCYTES NFR BLD AUTO: 17.9 %
MCH RBC QN AUTO: 29.7 PG (ref 26.5–33)
MCHC RBC AUTO-ENTMCNC: 32.8 G/DL (ref 31.5–36.5)
MCV RBC AUTO: 91 FL (ref 78–100)
MONOCYTES # BLD AUTO: 0.5 10E9/L (ref 0–1.3)
MONOCYTES NFR BLD AUTO: 5.8 %
NEUTROPHILS # BLD AUTO: 6.2 10E9/L (ref 1.6–8.3)
NEUTROPHILS NFR BLD AUTO: 75.5 %
NRBC # BLD AUTO: 0 10*3/UL
NRBC BLD AUTO-RTO: 0 /100
PLATELET # BLD AUTO: 306 10E9/L (ref 150–450)
POTASSIUM SERPL-SCNC: 3.6 MMOL/L (ref 3.4–5.3)
RBC # BLD AUTO: 4.58 10E12/L (ref 3.8–5.2)
SODIUM SERPL-SCNC: 137 MMOL/L (ref 133–144)
WBC # BLD AUTO: 8.2 10E9/L (ref 4–11)

## 2018-08-09 PROCEDURE — 25000128 H RX IP 250 OP 636: Performed by: EMERGENCY MEDICINE

## 2018-08-09 PROCEDURE — 99285 EMERGENCY DEPT VISIT HI MDM: CPT | Mod: 25

## 2018-08-09 PROCEDURE — 27210995 ZZH RX 272: Performed by: EMERGENCY MEDICINE

## 2018-08-09 PROCEDURE — 85610 PROTHROMBIN TIME: CPT | Performed by: EMERGENCY MEDICINE

## 2018-08-09 PROCEDURE — 70549 MR ANGIOGRAPH NECK W/O&W/DYE: CPT

## 2018-08-09 PROCEDURE — 25000132 ZZH RX MED GY IP 250 OP 250 PS 637: Performed by: EMERGENCY MEDICINE

## 2018-08-09 PROCEDURE — A9585 GADOBUTROL INJECTION: HCPCS | Performed by: EMERGENCY MEDICINE

## 2018-08-09 PROCEDURE — 80048 BASIC METABOLIC PNL TOTAL CA: CPT | Performed by: EMERGENCY MEDICINE

## 2018-08-09 PROCEDURE — 85025 COMPLETE CBC W/AUTO DIFF WBC: CPT | Performed by: EMERGENCY MEDICINE

## 2018-08-09 PROCEDURE — 99223 1ST HOSP IP/OBS HIGH 75: CPT | Performed by: PSYCHIATRY & NEUROLOGY

## 2018-08-09 RX ORDER — GADOBUTROL 604.72 MG/ML
10 INJECTION INTRAVENOUS ONCE
Status: COMPLETED | OUTPATIENT
Start: 2018-08-09 | End: 2018-08-09

## 2018-08-09 RX ORDER — OXYCODONE AND ACETAMINOPHEN 5; 325 MG/1; MG/1
2 TABLET ORAL ONCE
Status: COMPLETED | OUTPATIENT
Start: 2018-08-09 | End: 2018-08-09

## 2018-08-09 RX ORDER — ACYCLOVIR 200 MG/1
60 CAPSULE ORAL ONCE
Status: COMPLETED | OUTPATIENT
Start: 2018-08-09 | End: 2018-08-09

## 2018-08-09 RX ORDER — CYCLOBENZAPRINE HCL 10 MG
10 TABLET ORAL 3 TIMES DAILY PRN
COMMUNITY
End: 2020-03-03

## 2018-08-09 RX ORDER — METFORMIN HCL 500 MG
2000 TABLET, EXTENDED RELEASE 24 HR ORAL AT BEDTIME
COMMUNITY

## 2018-08-09 RX ORDER — OXYCODONE AND ACETAMINOPHEN 5; 325 MG/1; MG/1
1 TABLET ORAL EVERY 6 HOURS PRN
COMMUNITY
End: 2018-11-16

## 2018-08-09 RX ORDER — ASPIRIN 325 MG
325 TABLET ORAL ONCE
Status: COMPLETED | OUTPATIENT
Start: 2018-08-09 | End: 2018-08-09

## 2018-08-09 RX ADMIN — OXYCODONE HYDROCHLORIDE AND ACETAMINOPHEN 2 TABLET: 5; 325 TABLET ORAL at 13:19

## 2018-08-09 RX ADMIN — ASPIRIN 325 MG ORAL TABLET 325 MG: 325 PILL ORAL at 18:25

## 2018-08-09 RX ADMIN — GADOBUTROL 10 ML: 604.72 INJECTION INTRAVENOUS at 16:13

## 2018-08-09 RX ADMIN — SODIUM CHLORIDE 60 ML: 9 INJECTION, SOLUTION INTRAMUSCULAR; INTRAVENOUS; SUBCUTANEOUS at 16:13

## 2018-08-09 ASSESSMENT — ENCOUNTER SYMPTOMS
WEAKNESS: 0
HEADACHES: 0
SHORTNESS OF BREATH: 0
NECK PAIN: 1
ABDOMINAL PAIN: 0

## 2018-08-09 NOTE — ED AVS SNAPSHOT
Emergency Department    [address] [facility] [address]    Phone:  [phone]    Fax:  [phone]                                       Radha Fuentes   MRN: [id number]    Department:   Emergency Department   Date of Visit:  8/9/2018           Patient Information     Date Of Birth          1975        Your diagnoses for this visit were:     Vertebral artery dissection (H)        You were seen by Delmar Nash MD.      Follow-up Information     Follow up with Sumi Carreon MD.    Specialty:  Family Practice    Contact information:    [address]  [facility] [address]  [phone]          Follow up with Abdiaziz Vasquez MD. Schedule an appointment as soon as possible for a visit in 3 days.    Specialty:  Neurology    Contact information:    [facility]  [address] [id number] [phone]          Follow up with  Emergency Department.    Specialty:  EMERGENCY MEDICINE    Why:  As needed, If symptoms worsen    Contact information:    [address] [facility] [address] [phone]        Discharge Instructions       Take 325 mg of Aspirin daily.  Go immediately to the emergency room for any numbness, weakness, speech difficulty, or any other symptoms that are new or concerning to you.          24 Hour Appointment Hotline       To make an appointment at any [facility], call [phone] ([phone]). If you don't have a family doctor or clinic, we will help you find one. [place] clinics are conveniently located to serve the needs of you and your family.             Review of your medicines      Our records show that you are taking the medicines listed below. If these are incorrect, please call your family doctor or clinic.        Dose / Directions Last dose taken    cyclobenzaprine 10 MG tablet   Commonly known as:  FLEXERIL   Dose:  10 mg        Take 10 mg by mouth 3 times daily as needed for muscle  spasms   Refills:  0        metFORMIN 500 MG 24 hr tablet   Commonly known as:  GLUCOPHAGE-XR   Dose:  2000 mg        Take 2,000 mg by mouth At Bedtime   Refills:  0        naratriptan 2.5 MG tablet   Commonly known as:  AMERGE   Dose:  2.5 mg   Quantity:  27 tablet        Take 1 tablet (2.5 mg) by mouth at onset of headache for migraine May repeat in 4 hours. Max 2 tablets/24 hours.   Refills:  1        oxyCODONE-acetaminophen 5-325 MG per tablet   Commonly known as:  PERCOCET   Dose:  1 tablet        Take 1 tablet by mouth every 6 hours as needed for pain   Refills:  0        PREDNISONE PO        On taper:  Started on 8/7: 60 mg x3 days (3x 20 mg). So today (8/9) is last day of the 60 mg Then 40 mg (2x20 mg) x3 days Then 20 mg x.... Then stop (pt did not have full instruction.)   Refills:  0        sertraline 50 MG tablet   Commonly known as:  ZOLOFT   Dose:  50 mg        Take 50 mg by mouth At Bedtime   Refills:  0        SUMAtriptan 100 MG tablet   Commonly known as:  IMITREX   Dose:  100 mg   Quantity:  18 tablet        Take 1 tablet (100 mg) by mouth at onset of headache for migraine May repeat in 2 hours. Max 2 tablets/24 hours.   Refills:  3                Information about OPIOIDS     PRESCRIPTION OPIOIDS: WHAT YOU NEED TO KNOW   We gave you an opioid (narcotic) pain medicine. It is important to manage your pain, but opioids are not always the best choice. You should first try all the other options your care team gave you. Take this medicine for as short a time (and as few doses) as possible.    Some activities can increase your pain, such as bandage changes or therapy sessions. It may help to take your pain medicine 30 to 60 minutes before these activities. Reduce your stress by getting enough sleep, working on hobbies you enjoy and practicing relaxation or meditation. Talk to your care team about ways to manage your pain beyond prescription opioids.    These medicines have risks:    DO NOT drive when on  new or higher doses of pain medicine. These medicines can affect your alertness and reaction times, and you could be arrested for driving under the influence (DUI). If you need to use opioids long-term, talk to your care team about driving.    DO NOT operate heavy machinery    DO NOT do any other dangerous activities while taking these medicines.    DO NOT drink any alcohol while taking these medicines.     If the opioid prescribed includes acetaminophen, DO NOT take with any other medicines that contain acetaminophen. Read all labels carefully. Look for the word  acetaminophen  or  Tylenol.  Ask your pharmacist if you have questions or are unsure.    You can get addicted to pain medicines, especially if you have a history of addiction (chemical, alcohol or substance dependence). Talk to your care team about ways to reduce this risk.    All opioids tend to cause constipation. Drink plenty of water and eat foods that have a lot of fiber, such as fruits, vegetables, prune juice, apple juice and high-fiber cereal. Take a laxative (Miralax, milk of magnesia, Colace, Senna) if you don t move your bowels at least every other day. Other side effects include upset stomach, sleepiness, dizziness, throwing up, tolerance (needing more of the medicine to have the same effect), physical dependence and slowed breathing.    Store your pills in a secure place, locked if possible. We will not replace any lost or stolen medicine. If you don t finish your medicine, please throw away (dispose) as directed by your pharmacist. The [place] [employer]ution [employer] has more information about safe disposal: https://www.pca.state.[place]/living-green/managing-unwanted-medications        Procedures and tests performed during your visit     Basic metabolic panel    CBC with platelets + differential    INR    MRA Neck (Carotids) wo & w Contrast      Orders Needing Specimen Collection     None      Pending Results     No orders found from  8/7/2018 to 8/10/2018.            Pending Culture Results     No orders found from 8/7/2018 to 8/10/2018.            Pending Results Instructions     If you had any lab results that were not finalized at the time of your Discharge, you can call the ED Lab Result RN at [phone]. You will be contacted by this team for any positive Lab results or changes in treatment. The nurses are available 7 days a week from 10A to 6:30P.  You can leave a message 24 hours per day and they will return your call.        Test Results From Your Hospital Stay        8/9/2018  4:29 PM      Narrative     MRA NECK WITHOUT AND WITH CONTRAST  8/9/2018 4:00 PM     HISTORY: Possible vertebral artery dissection on MRI cervical spine.    TECHNIQUE: 2D time-of-flight MR angiogram of the neck without contrast  and 3D MR angiogram of the neck with 10 mL Gadavist. Estimates of  carotid stenoses are made relative to the distal internal carotid  artery diameters except as noted.    COMPARISON: MRI cervical spine 8/8/2018 from [facility].    FINDINGS:    Right Carotid:  Normal.    Left Carotid:  Normal.    Vertebral and Basilar: Dissection of the left vertebral artery with  prominent intramural hematoma visible throughout the V2 segment on  both fat suppressed T1-weighted images and gradient echo images. This  causes irregular stenoses of the left vertebral artery V2 segment but  not complete occlusion.    Aortic Arch and Branches:  Normal.        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 No focal occlusion.      CASSANDRA WHITT MD         8/9/2018  2:34 PM      Component Results     Component Value Ref Range & Units Status    WBC 8.2 4.0 - 11.0 10e9/L Final    RBC Count 4.58 3.8 - 5.2 10e12/L Final    Hemoglobin 13.6 11.7 - 15.7 g/dL Final    Hematocrit 41.5 35.0 - 47.0 % Final    MCV 91 78 - 100 fl Final    MCH 29.7 26.5 - 33.0 pg Final    MCHC 32.8 31.5 - 36.5  g/dL Final    RDW 13.3 10.0 - 15.0 % Final    Platelet Count 306 150 - 450 10e9/L Final    Diff Method Automated Method  Final    % Neutrophils 75.5 % Final    % Lymphocytes 17.9 % Final    % Monocytes 5.8 % Final    % Eosinophils 0.2 % Final    % Basophils 0.4 % Final    % Immature Granulocytes 0.2 % Final    Nucleated RBCs 0 0 /100 Final    Absolute Neutrophil 6.2 1.6 - 8.3 10e9/L Final    Absolute Lymphocytes 1.5 0.8 - 5.3 10e9/L Final    Absolute Monocytes 0.5 0.0 - 1.3 10e9/L Final    Absolute Eosinophils 0.0 0.0 - 0.7 10e9/L Final    Absolute Basophils 0.0 0.0 - 0.2 10e9/L Final    Abs Immature Granulocytes 0.0 0 - 0.4 10e9/L Final    Absolute Nucleated RBC 0.0  Final         8/9/2018  2:45 PM      Component Results     Component Value Ref Range & Units Status    Sodium 137 133 - 144 mmol/L Final    Potassium 3.6 3.4 - 5.3 mmol/L Final    Chloride 103 94 - 109 mmol/L Final    Carbon Dioxide 27 20 - 32 mmol/L Final    Anion Gap 7 3 - 14 mmol/L Final    Glucose 80 70 - 99 mg/dL Final    Urea Nitrogen 16 7 - 30 mg/dL Final    Creatinine 0.60 0.52 - 1.04 mg/dL Final    GFR Estimate >90 >60 mL/min/1.7m2 Final    Non  GFR Calc    GFR Estimate If Black >90 >60 mL/min/1.7m2 Final    African American GFR Calc    Calcium 9.3 8.5 - 10.1 mg/dL Final         8/9/2018  2:41 PM      Component Results     Component Value Ref Range & Units Status    INR 0.86 0.86 - 1.14 Final                Clinical Quality Measure: Blood Pressure Screening     Your blood pressure was checked while you were in the emergency department today. The last reading we obtained was  BP: (!) 152/99 . Please read the guidelines below about what these numbers mean and what you should do about them.  If your systolic blood pressure (the top number) is less than 120 and your diastolic blood pressure (the bottom number) is less than 80, then your blood pressure is normal. There is nothing more that you need to do about it.  If your systolic  blood pressure (the top number) is 120-139 or your diastolic blood pressure (the bottom number) is 80-89, your blood pressure may be higher than it should be. You should have your blood pressure rechecked within a year by a primary care provider.  If your systolic blood pressure (the top number) is 140 or greater or your diastolic blood pressure (the bottom number) is 90 or greater, you may have high blood pressure. High blood pressure is treatable, but if left untreated over time it can put you at risk for heart attack, stroke, or kidney failure. You should have your blood pressure rechecked by a primary care provider within the next 4 weeks.  If your provider in the emergency department today gave you specific instructions to follow-up with your doctor or provider even sooner than that, you should follow that instruction and not wait for up to 4 weeks for your follow-up visit.        Thank you for choosing [facility]       Thank you for choosing [facility] for your care. Our goal is always to provide you with excellent care. Hearing back from our patients is one way we can continue to improve our services. Please take a few minutes to complete the written survey that you may receive in the mail after you visit with us. Thank you!        [employer] Information     [employer] gives you secure access to your electronic health record. If you see a primary care provider, you can also send messages to your care team and make appointments. If you have questions, please call your primary care clinic.  If you do not have a primary care provider, please call [phone] and they will assist you.        Care EveryWhere ID     This is your Care EveryWhere ID. This could be used by other organizations to access your [facility] medical records  [id number]        Equal Access to Services     RAMIREZ MACKENZIE : kelly Bae, bigg rodriguez. So [facility]  [phone].    ATENCIÓN: Si habla español, tiene a mcfarland disposición servicios gratuitos de asistencia lingüística. Llame al [phone].    We comply with applicable federal civil rights laws and [place] laws. We do not discriminate on the basis of race, color, national origin, age, disability, sex, sexual orientation, or gender identity.            After Visit Summary       This is your record. Keep this with you and show to your community pharmacist(s) and doctor(s) at your next visit.

## 2018-08-09 NOTE — ED NOTES
Talked with pt re: signs to return to ED. She verbalized understanding and said all her questions were answered by the doctors previously.

## 2018-08-09 NOTE — DISCHARGE INSTRUCTIONS
Take 325 mg of Aspirin daily.  Go immediately to the emergency room for any numbness, weakness, speech difficulty, or any other symptoms that are new or concerning to you.

## 2018-08-09 NOTE — PHARMACY-ADMISSION MEDICATION HISTORY
Admission medication history interview status for the 8/9/2018  admission is complete. See EPIC admission navigator for prior to admission medications     Medication history source reliability:Good    Actions taken by pharmacist (provider contacted, etc):None     Additional medication history information not noted on PTA med list :None    Medication reconciliation/reorder completed by provider prior to medication history? No    Time spent in this activity: 12 min    Prior to Admission medications    Medication Sig Last Dose Taking? Auth Provider   cyclobenzaprine (FLEXERIL) 10 MG tablet Take 10 mg by mouth 3 times daily as needed for muscle spasms Past Week at Unknown time Yes Unknown, Entered By History   metFORMIN (GLUCOPHAGE-XR) 500 MG 24 hr tablet Take 2,000 mg by mouth At Bedtime 8/8/2018 at Unknown time Yes Unknown, Entered By History   naratriptan (AMERGE) 2.5 MG tablet Take 1 tablet (2.5 mg) by mouth at onset of headache for migraine May repeat in 4 hours. Max 2 tablets/24 hours. Past Month at Unknown time Yes Sumi Carreon MD   oxyCODONE-acetaminophen (PERCOCET) 5-325 MG per tablet Take 1 tablet by mouth every 6 hours as needed for pain Past Week at Unknown time Yes Unknown, Entered By History   PREDNISONE PO On taper:   Started on 8/7: 60 mg x3 days (3x 20 mg). So today (8/9) is last day of the 60 mg  Then 40 mg (2x20 mg) x3 days  Then 20 mg x.... Then stop (pt did not have full instruction.) 8/9/2018 at 60 mg Yes Unknown, Entered By History   sertraline (ZOLOFT) 50 MG tablet Take 50 mg by mouth At Bedtime 8/8/2018 at Unknown time Yes Unknown, Entered By History   SUMAtriptan (IMITREX) 100 MG tablet Take 1 tablet (100 mg) by mouth at onset of headache for migraine May repeat in 2 hours. Max 2 tablets/24 hours. Past Month at Unknown time Yes Sumi Carreon MD

## 2018-08-09 NOTE — ED NOTES
"[facility]  ED Nurse Handoff Report    ED Chief complaint: Neck Pain (7-10 days of neck pain. Had MRI last night and had dissection in neck per report)      ED Diagnosis:   Final diagnoses:   Vertebral artery dissection (H)       Code Status: Full Code    Allergies: No Known Allergies    Activity level - Baseline/Home:  Independent    Activity Level - Current:   Independent     Needed?: No    Isolation: No  Infection: Not Applicable  Bariatric?: No    Vital Signs:   Vitals:    08/09/18 1124   BP: (!) 161/101   Resp: 16   Temp: 98.5  F (36.9  C)   TempSrc: Oral   SpO2: 100%   Weight: 73.5 kg (162 lb)   Height: 1.778 m (5' 10\")       Cardiac Rhythm: ,        Pain level: 0-10 Pain Scale: 7    Is this patient confused?: No   [place] - Suicide Severity Rating Scale Completed?  Yes  If yes, what color did the patient score?  White    Patient Report: Initial Complaint: Neck pain  Focused Assessment: Patient sent over by [facility] for vertebral artery dissection. Complains of neck pain for 2 weeks. Was being seen by ortho and was doing home management for pain. States had MRI prior to coming to ED and was instructed to come her for eval because of dissection.  Patient received 2 percocet for pain and had an MRA of her neck.  Tests Performed: MRA  Abnormal Results:   Results for orders placed or performed during the hospital encounter of 08/09/18   CBC with platelets + differential   Result Value Ref Range    WBC 8.2 4.0 - 11.0 10e9/L    RBC Count 4.58 3.8 - 5.2 10e12/L    Hemoglobin 13.6 11.7 - 15.7 g/dL    Hematocrit 41.5 35.0 - 47.0 %    MCV 91 78 - 100 fl    MCH 29.7 26.5 - 33.0 pg    MCHC 32.8 31.5 - 36.5 g/dL    RDW 13.3 10.0 - 15.0 %    Platelet Count 306 150 - 450 10e9/L    Diff Method Automated Method     % Neutrophils 75.5 %    % Lymphocytes 17.9 %    % Monocytes 5.8 %    % Eosinophils 0.2 %    % Basophils 0.4 %    % Immature Granulocytes 0.2 %    Nucleated RBCs 0 0 /100    Absolute " Neutrophil 6.2 1.6 - 8.3 10e9/L    Absolute Lymphocytes 1.5 0.8 - 5.3 10e9/L    Absolute Monocytes 0.5 0.0 - 1.3 10e9/L    Absolute Eosinophils 0.0 0.0 - 0.7 10e9/L    Absolute Basophils 0.0 0.0 - 0.2 10e9/L    Abs Immature Granulocytes 0.0 0 - 0.4 10e9/L    Absolute Nucleated RBC 0.0    Basic metabolic panel   Result Value Ref Range    Sodium 137 133 - 144 mmol/L    Potassium 3.6 3.4 - 5.3 mmol/L    Chloride 103 94 - 109 mmol/L    Carbon Dioxide 27 20 - 32 mmol/L    Anion Gap 7 3 - 14 mmol/L    Glucose 80 70 - 99 mg/dL    Urea Nitrogen 16 7 - 30 mg/dL    Creatinine 0.60 0.52 - 1.04 mg/dL    GFR Estimate >90 >60 mL/min/1.7m2    GFR Estimate If Black >90 >60 mL/min/1.7m2    Calcium 9.3 8.5 - 10.1 mg/dL   INR   Result Value Ref Range    INR 0.86 0.86 - 1.14           Family Comments: Spouse at bedside    OBS brochure/video discussed/provided to patient: No    ED Medications:   Medications   oxyCODONE-acetaminophen (PERCOCET) 5-325 MG per tablet 2 tablet (2 tablets Oral Given 8/9/18 1319)       Drips infusing?:  No    For the majority of the shift this patient was Green.   Interventions performed were N/A.    Severe Sepsis OR Septic Shock Diagnosis Present: No      ED NURSE PHONE NUMBER: [phone]

## 2018-08-09 NOTE — ED PROVIDER NOTES
"  History     Chief Complaint:  Neck pain    HPI   Radha Fuentes is a 43 year old female who presents to the emergency department with her  for evaluation of neck pain. The patient reports that for the last two weeks she had the sudden onset of deep left sided neck pain. She woke up with a stiff neck and took Aleve and a muscle relaxer because she originally thought it was her shoulder, as she has had a lot of shoulder issues in the past, but still had no improvement. The patient then went to [facility] and was prescribed Prednisone, muscle relaxer's, and oxycodone, for what they also thought was a shoulder issue. She had an XR done and was told it looked \"as though there was an injury\" so she then had a MR of cervical spine, findings below. The patient also notes bilateral finger tingling and that the pain worsens when breathing. She states that her father's side and mother's side both have a history of arterial dissections. The patient denies weakness, headaches, vision problems, chest pain, shortness of breath, abdominal pain.      (8/8/18) MR Cervical Spine:   Conclusion:  1. There is bright T1 signal seen in the region of the left vertebral artery on sagittal T1 imaging. There is crescentic signal surrounding the vertebral artery lumen on axial images. Imagine appearance is suspicious for left vertebral artery dissection. Dedicated CTA or MRA imagine of the neck is recommended.   2. Mild multilevel cervical spondylitic changes without spinal canal stenosis or neural foraminal narrowing.     Allergies:  No Known Drug Allergies    Medications:    Vitamin D  Glucophage  Amerge  Zoloft  Imitrex  Kenalog     Past Medical History:    Gallstones  Migraine  PCOS  Anxiety    Past Surgical History:    Left arthroscopy of left knee  Breast reconstruction with implants  Cholecystectomy  Tonsillectomy     Family History:    Marfan's disease  Heart disease: Mother  Aortic aneurysm and arterial " "dissections    Social History:  Former smoker  Negative for alcohol use.  Marital Status:        Review of Systems   Eyes: Negative for visual disturbance.   Respiratory: Negative for shortness of breath.    Cardiovascular: Negative for chest pain.   Gastrointestinal: Negative for abdominal pain.   Musculoskeletal: Positive for neck pain.   Neurological: Negative for weakness and headaches.        Positive for bilateral finger tingling.    All other systems reviewed and are negative.    Physical Exam   First Vitals:  BP: (!) 161/101  Heart Rate: 95  Temp: 98.5  F (36.9  C)  Resp: 16  Height: 177.8 cm (5' 10\")  Weight: 73.5 kg (162 lb)  SpO2: 100 %    Patient Vitals for the past 24 hrs:   BP Temp Temp src Heart Rate Resp SpO2 Height Weight   08/09/18 1124 (!) 161/101 98.5  F (36.9  C) Oral 95 16 100 % 1.778 m (5' 10\") 73.5 kg (162 lb)     Physical Exam    Constitutional: White female, supine  HENT: No signs of trauma.   Eyes: EOM are normal. Pupils are equal, round, and reactive to light.   Neck: Normal range of motion. No JVD present. No cervical adenopathy. No bruits.   Cardiovascular: Regular rhythm.  Exam reveals no gallop and no friction rub.    No murmur heard.  2+ radial and femoral pulses bilaterally.  Pulmonary/Chest: Bilateral breath sounds normal. No wheezes, rhonchi or rales.  Abdominal: Soft. No tenderness. No rebound or guarding.   Musculoskeletal: No edema. No tenderness.   Lymphadenopathy: No lymphadenopathy.   Neurological: Alert and oriented to person, place, and time. Normal strength. Coordination normal. Fluent speech, no facial asymmetry, normal sensation.  Skin: Skin is warm and dry. No rash noted. No erythema.     Emergency Department Course   Imaging:  MRA Neck (Carotids) w/o & w contrast:   pending      Radiographic findings were communicated with the patient who voiced understanding of the findings.    Laboratory:  CBC: WBC: 8.2, HGB: 13.6, PLT: 306  BMP: o/w WNL (Creatinine: " 0.60)    INR: 0.86    Interventions:  1319 Percocet 5-325 mg two tablets PO  Medications   oxyCODONE-acetaminophen (PERCOCET) 5-325 MG per tablet 2 tablet (2 tablets Oral Given 8/9/18 1319)       Emergency Department Course:  1156 Nursing notes and vitals reviewed. I performed an exam of the patient as documented above.     Medicine administered as documented above. Blood drawn. This was sent to the lab for further testing, findings above.     The patient was sent for a neck (carotids) MRA while in the emergency department, findings above.     1217 I consulted with Dr. Tadeo, neurology, regarding the patient's history and presentation here in the emergency department.      Impression & Plan    Medical Decision Making:  This is a 43 year old who has been having neck pain for ten days on the left side of her neck which she woke up with. She has been seeing ortho and she got an injection into her shoulder that did not help. She had C spine films which did not help and has been using both muscle relaxer's and narcotics. She went back yesterday and had an MRI of the C spine done, which didn't show any acute spinal pathology, but there was an intense signal on the left vertebral artery which suggested vertebral dissection. There is a lot of arterial disease in both her father and mother's side of the family, including a possibility of Marfan's on her father's side. On my exam, there was some mild tenderness at the base of the left neck, there are no bruits, and she is neurologically intact. I have ordered an MRA with T1 fats suppression after discussing the case with neurologist, Dr. Tadeo, and this is pending. Further treatment will be based on this result.     Diagnosis:    ICD-10-CM    1. Vertebral artery dissection (H) I77.74       Disposition:  Pending; discussed with Dr Luis Akins Disclosure:  I, Rakesh Virk, am serving as a scribe on 8/9/2018 at 11:56 AM to personally document services performed by  Dr. Saad MD based on my observations and the provider's statements to me.     Rakesh Virk  8/9/2018    EMERGENCY DEPARTMENT       Delmar Nash MD  08/09/18 7788

## 2018-08-09 NOTE — ED PROVIDER NOTES
Radha Fuentes is a 43 year old female who was seen by Dr. Nash and signed out to me pending MRI results.  MRI shows extensive dissection with intramural heamtoma, without complete occlusion - results discussed with pt.  Pt discussed with Dr. Tadeo ([facility]) who will come to ED and discuss further with the patient.    Constance Bianchi MD  08/09/18 0294

## 2018-08-10 NOTE — CONSULTS
"[facility]     Stroke Consult     HPI  Radha Fuentes is a 43 year old female with migraines who presents with pain in the left neck x2 weeks; MRI shows V2-segment dissection without occlusion.  She notes she had left neck/shoulder pain approximately two weeks ago--no underlying injury she is aware of.  Since that time she has been vigorously stretching and cracking her neck.  She underwent MRI cervical spine today which was concerning for dissection, confirmed on dedicated MRA.    She noted a \"hot\" sensation in the left side of her face previously and has maybe felt unsteady, but no clear evidence of focal neurologic symptoms suggesting stroke/TIA.        Stroke Scales      National Institutes of Health Stroke Scale (at presentation)   NIHSS done at:  time patient seen      Score    Level of consciousness:  (0)   Alert, keenly responsive    LOC questions:  (0)   Answers both questions correctly    LOC commands:  (0)   Performs both tasks correctly    Best gaze:  (0)   Normal    Visual:  (0)   No visual loss    Facial palsy:  (0)   Normal symmetrical movements    Motor arm (left):  (0)   No drift    Motor arm (right):  (0)   No drift    Motor leg (left):  (0)   No drift    Motor leg (right):  (0)   No drift    Limb ataxia:  (0)   Absent    Sensory:  (0)   Normal- no sensory loss    Best language:  (0)   Normal- no aphasia    Dysarthria:  (0)   Normal    Extinction and inattention:  (0)   No abnormality        NIHSS Total Score:  0        Impression:  1. 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  Asymptomatic, other than neck pain    Recommendations  1. Aspirin 325mg daily  2. Follow-up with Neurology 4-6 weeks  3. Return to ED immediately with any new stroke symptoms: stroke education provided.  They live near [facility] and will go to that ED which is [facility] by [facility]    Siddharth Tadeo MD, MS  Vascular Neurology  August 9, 2018    Please contact the " stroke service with any questions: link to Text page or feel free to call my cellphone.  I personally reviewed all relevant labs and neuroimaging.      ____________________________________________________________________      Past Medical History:   Diagnosis Date     Gallstone      Migraine     sees neurologist-      PCOS (polycystic ovarian syndrome)     on metformin       No current facility-administered medications for this encounter.     Current Outpatient Prescriptions:      cyclobenzaprine (FLEXERIL) 10 MG tablet, Take 10 mg by mouth 3 times daily as needed for muscle spasms, Disp: , Rfl:      metFORMIN (GLUCOPHAGE-XR) 500 MG 24 hr tablet, Take 2,000 mg by mouth At Bedtime, Disp: , Rfl:      naratriptan (AMERGE) 2.5 MG tablet, Take 1 tablet (2.5 mg) by mouth at onset of headache for migraine May repeat in 4 hours. Max 2 tablets/24 hours., Disp: 27 tablet, Rfl: 1     oxyCODONE-acetaminophen (PERCOCET) 5-325 MG per tablet, Take 1 tablet by mouth every 6 hours as needed for pain, Disp: , Rfl:      PREDNISONE PO, On taper:  Started on 8/7: 60 mg x3 days (3x 20 mg). So today (8/9) is last day of the 60 mg Then 40 mg (2x20 mg) x3 days Then 20 mg x.... Then stop (pt did not have full instruction.), Disp: , Rfl:      sertraline (ZOLOFT) 50 MG tablet, Take 50 mg by mouth At Bedtime, Disp: , Rfl:      SUMAtriptan (IMITREX) 100 MG tablet, Take 1 tablet (100 mg) by mouth at onset of headache for migraine May repeat in 2 hours. Max 2 tablets/24 hours., Disp: 18 tablet, Rfl: 3     [DISCONTINUED] sertraline (ZOLOFT) 50 MG tablet, Take 1.5 tablets (75 mg) by mouth daily, Disp: 135 tablet, Rfl: 1    Facility-Administered Medications Ordered in Other Encounters:      fentaNYL (SUBLIMAZE) injection, , EPIDURAL, PRN, Brent Triana MD, 100 mcg at 09/09/15 1216     ROPivacaine (NAROPIN) injection, , , PRN, Brent Triana MD, 10 mL at 09/09/15 1205    Social history & family history reviewed, please refer to admission H&P    ROS:  completed in detail and negative unless otherwise noted above.    Vital Signs:  B/P: 152/99, T: 98.5, P: Data Unavailable, R: 12     Neuro:  Mental status: Awake, alert, attentive, oriented x3. Speech is fluent, comprehension and repetition intact. No dysarthria.  Good historian.  No neglect.  Cranial nerves: EOMI, visual fields full, face symmetric, facial sensation intact, shoulder shrug strong, tongue/uvula midline, hearing intact to conversation.  Motor: 5/5 strength in all 4 extremities without drift.  Sensory (assessed via nursing): Intact to light touch in face, arms, and legs  Coordination: Finger to nose intact bilaterally without dysmetria.  Normal heel-shin test bilaterally  Gait: Deferred     Labs/Studies:  CBC:     Recent Labs  Lab 08/09/18  1144   WBC 8.2   RBC 4.58   HGB 13.6   HCT 41.5        Basic Metabolic Panel:   Recent Labs   Lab Test  08/09/18   1144 04/23/17 06/24/15 01/12/15  01/05/11   1153   NA  137   --    --   136.0  140   POTASSIUM  3.6  4.3   --   4.0  4.3   CHLORIDE  103   --    --   102.0  103   CO2  27   --    --    --   27   BUN  16   --    --   12.0  15   CR  0.60  0.70   --   0.7  0.67   GLC  80  89  164*  87.0  86   KANE  9.3   --    --   8.8  9.4     Liver panel:  Recent Labs   Lab Test  03/01/18   1336 04/23/17  09/08/15   2046  01/05/11   1153   PROTTOTAL  7.3   --    --   7.6   ALBUMIN  3.7   --    --   4.3   BILITOTAL  0.2   --    --   0.6   ALKPHOS  54   --    --   46   AST  19  15  23  31   ALT  17  13  31  28     INR:  Recent Labs   Lab Test  08/09/18   1144   INR  0.86      Lipid Profile:  Recent Labs   Lab Test  01/05/11   1153   CHOL  186   HDL  67   LDL  101   TRIG  85   CHOLHDLRATIO  2.8     A1C: No lab results found.  Troponin I: No lab results found.      Imaging:  Relevant findings as per the Impression above.    I have personally spent a total of 60 minutes consulting with her medical providers and assessing the patient today, with more than 50% of this  time spent in consultation, coordination of care, and discussion with the patient and/or family regarding diagnostic results, prognosis, symptom management, risks and benefits of management options, and development of plan of care.

## 2018-08-11 ENCOUNTER — HOSPITAL ENCOUNTER (OUTPATIENT)
Facility: CLINIC | Age: 43
Setting detail: OBSERVATION
Discharge: HOME | End: 2018-08-12
Attending: INTERNAL MEDICINE | Admitting: INTERNAL MEDICINE
Payer: COMMERCIAL

## 2018-08-11 ENCOUNTER — APPOINTMENT (OUTPATIENT)
Dept: CT IMAGING | Facility: CLINIC | Age: 43
End: 2018-08-11
Attending: EMERGENCY MEDICINE
Payer: COMMERCIAL

## 2018-08-11 ENCOUNTER — APPOINTMENT (OUTPATIENT)
Dept: MRI IMAGING | Facility: CLINIC | Age: 43
End: 2018-08-11
Attending: EMERGENCY MEDICINE
Payer: COMMERCIAL

## 2018-08-11 ENCOUNTER — HOSPITAL ENCOUNTER (EMERGENCY)
Facility: CLINIC | Age: 43
Discharge: ANOTHER HOSPITAL | End: 2018-08-11
Attending: EMERGENCY MEDICINE | Admitting: EMERGENCY MEDICINE
Payer: COMMERCIAL

## 2018-08-11 VITALS
OXYGEN SATURATION: 99 % | DIASTOLIC BLOOD PRESSURE: 93 MMHG | RESPIRATION RATE: 15 BRPM | HEIGHT: 70 IN | WEIGHT: 165 LBS | BODY MASS INDEX: 23.62 KG/M2 | SYSTOLIC BLOOD PRESSURE: 139 MMHG | TEMPERATURE: 98.5 F

## 2018-08-11 DIAGNOSIS — Z86.73 HISTORY OF CVA (CEREBROVASCULAR ACCIDENT): ICD-10-CM

## 2018-08-11 DIAGNOSIS — I77.74 VERTEBRAL ARTERY DISSECTION (H): ICD-10-CM

## 2018-08-11 DIAGNOSIS — I77.74 VERTEBRAL ARTERY DISSECTION (H): Primary | ICD-10-CM

## 2018-08-11 LAB
ANION GAP SERPL CALCULATED.3IONS-SCNC: 9 MMOL/L (ref 3–14)
APTT PPP: 25 SEC (ref 22–37)
BASOPHILS # BLD AUTO: 0.1 10E9/L (ref 0–0.2)
BASOPHILS NFR BLD AUTO: 0.6 %
BUN SERPL-MCNC: 14 MG/DL (ref 7–30)
CALCIUM SERPL-MCNC: 8.4 MG/DL (ref 8.5–10.1)
CHLORIDE SERPL-SCNC: 103 MMOL/L (ref 94–109)
CO2 SERPL-SCNC: 25 MMOL/L (ref 20–32)
CREAT SERPL-MCNC: 0.76 MG/DL (ref 0.52–1.04)
DIFFERENTIAL METHOD BLD: NORMAL
EOSINOPHIL # BLD AUTO: 0.1 10E9/L (ref 0–0.7)
EOSINOPHIL NFR BLD AUTO: 1.1 %
ERYTHROCYTE [DISTWIDTH] IN BLOOD BY AUTOMATED COUNT: 12.7 % (ref 10–15)
GFR SERPL CREATININE-BSD FRML MDRD: 83 ML/MIN/1.7M2
GLUCOSE BLDC GLUCOMTR-MCNC: 73 MG/DL (ref 70–99)
GLUCOSE SERPL-MCNC: 77 MG/DL (ref 70–99)
HCT VFR BLD AUTO: 45.4 % (ref 35–47)
HGB BLD-MCNC: 14.8 G/DL (ref 11.7–15.7)
IMM GRANULOCYTES # BLD: 0 10E9/L (ref 0–0.4)
IMM GRANULOCYTES NFR BLD: 0.2 %
INR PPP: 0.93 (ref 0.86–1.14)
LYMPHOCYTES # BLD AUTO: 3.5 10E9/L (ref 0.8–5.3)
LYMPHOCYTES NFR BLD AUTO: 42.4 %
MCH RBC QN AUTO: 29.9 PG (ref 26.5–33)
MCHC RBC AUTO-ENTMCNC: 32.6 G/DL (ref 31.5–36.5)
MCV RBC AUTO: 92 FL (ref 78–100)
MONOCYTES # BLD AUTO: 0.9 10E9/L (ref 0–1.3)
MONOCYTES NFR BLD AUTO: 10.6 %
NEUTROPHILS # BLD AUTO: 3.7 10E9/L (ref 1.6–8.3)
NEUTROPHILS NFR BLD AUTO: 45.1 %
NRBC # BLD AUTO: 0 10*3/UL
NRBC BLD AUTO-RTO: 0 /100
PLATELET # BLD AUTO: 331 10E9/L (ref 150–450)
POTASSIUM SERPL-SCNC: 3.7 MMOL/L (ref 3.4–5.3)
RBC # BLD AUTO: 4.95 10E12/L (ref 3.8–5.2)
SODIUM SERPL-SCNC: 137 MMOL/L (ref 133–144)
TROPONIN I SERPL-MCNC: <0.015 UG/L (ref 0–0.04)
WBC # BLD AUTO: 8.2 10E9/L (ref 4–11)

## 2018-08-11 PROCEDURE — 70470 CT HEAD/BRAIN W/O & W/DYE: CPT | Mod: XS

## 2018-08-11 PROCEDURE — 99285 EMERGENCY DEPT VISIT HI MDM: CPT | Mod: 25

## 2018-08-11 PROCEDURE — 25000128 H RX IP 250 OP 636: Performed by: EMERGENCY MEDICINE

## 2018-08-11 PROCEDURE — 70450 CT HEAD/BRAIN W/O DYE: CPT

## 2018-08-11 PROCEDURE — 25000132 ZZH RX MED GY IP 250 OP 250 PS 637: Performed by: EMERGENCY MEDICINE

## 2018-08-11 PROCEDURE — 99220 ZZC INITIAL OBSERVATION CARE,LEVL III: CPT | Performed by: PHYSICIAN ASSISTANT

## 2018-08-11 PROCEDURE — 71555 MRI ANGIO CHEST W OR W/O DYE: CPT

## 2018-08-11 PROCEDURE — 85025 COMPLETE CBC W/AUTO DIFF WBC: CPT | Performed by: EMERGENCY MEDICINE

## 2018-08-11 PROCEDURE — 85610 PROTHROMBIN TIME: CPT | Performed by: EMERGENCY MEDICINE

## 2018-08-11 PROCEDURE — 00000146 ZZHCL STATISTIC GLUCOSE BY METER IP

## 2018-08-11 PROCEDURE — G0508 CRIT CARE TELEHEA CONSULT 60: HCPCS | Performed by: PSYCHIATRY & NEUROLOGY

## 2018-08-11 PROCEDURE — 80048 BASIC METABOLIC PNL TOTAL CA: CPT | Performed by: EMERGENCY MEDICINE

## 2018-08-11 PROCEDURE — 84484 ASSAY OF TROPONIN QUANT: CPT | Performed by: EMERGENCY MEDICINE

## 2018-08-11 PROCEDURE — 25000132 ZZH RX MED GY IP 250 OP 250 PS 637: Performed by: PHYSICIAN ASSISTANT

## 2018-08-11 PROCEDURE — 70496 CT ANGIOGRAPHY HEAD: CPT

## 2018-08-11 PROCEDURE — G0378 HOSPITAL OBSERVATION PER HR: HCPCS

## 2018-08-11 PROCEDURE — 85730 THROMBOPLASTIN TIME PARTIAL: CPT | Performed by: EMERGENCY MEDICINE

## 2018-08-11 PROCEDURE — 93005 ELECTROCARDIOGRAM TRACING: CPT

## 2018-08-11 PROCEDURE — 70553 MRI BRAIN STEM W/O & W/DYE: CPT

## 2018-08-11 PROCEDURE — A9585 GADOBUTROL INJECTION: HCPCS | Performed by: EMERGENCY MEDICINE

## 2018-08-11 PROCEDURE — 93005 ELECTROCARDIOGRAM TRACING: CPT | Mod: 76

## 2018-08-11 RX ORDER — CLOPIDOGREL BISULFATE 75 MG/1
75 TABLET ORAL DAILY
Status: DISCONTINUED | OUTPATIENT
Start: 2018-08-12 | End: 2018-08-11

## 2018-08-11 RX ORDER — ACETAMINOPHEN 650 MG/1
650 SUPPOSITORY RECTAL EVERY 4 HOURS PRN
Status: DISCONTINUED | OUTPATIENT
Start: 2018-08-11 | End: 2018-08-12 | Stop reason: HOSPADM

## 2018-08-11 RX ORDER — CLOPIDOGREL BISULFATE 75 MG/1
300 TABLET ORAL ONCE
Status: COMPLETED | OUTPATIENT
Start: 2018-08-11 | End: 2018-08-11

## 2018-08-11 RX ORDER — CLOPIDOGREL BISULFATE 75 MG/1
300 TABLET ORAL ONCE
Status: DISCONTINUED | OUTPATIENT
Start: 2018-08-11 | End: 2018-08-11

## 2018-08-11 RX ORDER — OXYCODONE HYDROCHLORIDE 5 MG/1
5 TABLET ORAL EVERY 4 HOURS PRN
Status: DISCONTINUED | OUTPATIENT
Start: 2018-08-11 | End: 2018-08-12 | Stop reason: HOSPADM

## 2018-08-11 RX ORDER — CLOPIDOGREL BISULFATE 75 MG/1
75 TABLET ORAL DAILY
Status: DISCONTINUED | OUTPATIENT
Start: 2018-08-12 | End: 2018-08-12 | Stop reason: HOSPADM

## 2018-08-11 RX ORDER — ASPIRIN 325 MG
325 TABLET ORAL DAILY
Status: DISCONTINUED | OUTPATIENT
Start: 2018-08-11 | End: 2018-08-12 | Stop reason: HOSPADM

## 2018-08-11 RX ORDER — ONDANSETRON 2 MG/ML
4 INJECTION INTRAMUSCULAR; INTRAVENOUS EVERY 6 HOURS PRN
Status: DISCONTINUED | OUTPATIENT
Start: 2018-08-11 | End: 2018-08-12 | Stop reason: HOSPADM

## 2018-08-11 RX ORDER — GADOBUTROL 604.72 MG/ML
10 INJECTION INTRAVENOUS ONCE
Status: COMPLETED | OUTPATIENT
Start: 2018-08-11 | End: 2018-08-11

## 2018-08-11 RX ORDER — LABETALOL HYDROCHLORIDE 5 MG/ML
10-40 INJECTION, SOLUTION INTRAVENOUS EVERY 10 MIN PRN
Status: DISCONTINUED | OUTPATIENT
Start: 2018-08-11 | End: 2018-08-12 | Stop reason: HOSPADM

## 2018-08-11 RX ORDER — GADOBUTROL 604.72 MG/ML
10 INJECTION INTRAVENOUS ONCE
Status: DISCONTINUED | OUTPATIENT
Start: 2018-08-11 | End: 2018-08-11

## 2018-08-11 RX ORDER — CYCLOBENZAPRINE HCL 10 MG
10 TABLET ORAL 3 TIMES DAILY PRN
Status: DISCONTINUED | OUTPATIENT
Start: 2018-08-11 | End: 2018-08-12 | Stop reason: HOSPADM

## 2018-08-11 RX ORDER — NALOXONE HYDROCHLORIDE 0.4 MG/ML
.1-.4 INJECTION, SOLUTION INTRAMUSCULAR; INTRAVENOUS; SUBCUTANEOUS
Status: DISCONTINUED | OUTPATIENT
Start: 2018-08-11 | End: 2018-08-12 | Stop reason: HOSPADM

## 2018-08-11 RX ORDER — HYDRALAZINE HYDROCHLORIDE 20 MG/ML
10-20 INJECTION INTRAMUSCULAR; INTRAVENOUS
Status: DISCONTINUED | OUTPATIENT
Start: 2018-08-11 | End: 2018-08-12 | Stop reason: HOSPADM

## 2018-08-11 RX ORDER — ACETAMINOPHEN 325 MG/1
650 TABLET ORAL EVERY 4 HOURS PRN
Status: DISCONTINUED | OUTPATIENT
Start: 2018-08-11 | End: 2018-08-12 | Stop reason: HOSPADM

## 2018-08-11 RX ORDER — ONDANSETRON 4 MG/1
4 TABLET, ORALLY DISINTEGRATING ORAL EVERY 6 HOURS PRN
Status: DISCONTINUED | OUTPATIENT
Start: 2018-08-11 | End: 2018-08-12 | Stop reason: HOSPADM

## 2018-08-11 RX ORDER — IOPAMIDOL 755 MG/ML
500 INJECTION, SOLUTION INTRAVASCULAR ONCE
Status: COMPLETED | OUTPATIENT
Start: 2018-08-11 | End: 2018-08-11

## 2018-08-11 RX ADMIN — ACETAMINOPHEN 650 MG: 325 TABLET, FILM COATED ORAL at 19:35

## 2018-08-11 RX ADMIN — IOPAMIDOL 120 ML: 755 INJECTION, SOLUTION INTRAVENOUS at 11:48

## 2018-08-11 RX ADMIN — Medication 1 MG: at 22:55

## 2018-08-11 RX ADMIN — SODIUM CHLORIDE 80 ML: 900 INJECTION, SOLUTION INTRAVENOUS at 11:48

## 2018-08-11 RX ADMIN — CYCLOBENZAPRINE HYDROCHLORIDE 10 MG: 10 TABLET, FILM COATED ORAL at 22:55

## 2018-08-11 RX ADMIN — OXYCODONE HYDROCHLORIDE 5 MG: 5 TABLET ORAL at 19:35

## 2018-08-11 RX ADMIN — GADOBUTROL 10 ML: 604.72 INJECTION INTRAVENOUS at 15:22

## 2018-08-11 RX ADMIN — CLOPIDOGREL 300 MG: 75 TABLET, FILM COATED ORAL at 16:49

## 2018-08-11 ASSESSMENT — ENCOUNTER SYMPTOMS
NUMBNESS: 1
HEADACHES: 1
ACTIVITY CHANGE: 0
ABDOMINAL PAIN: 0
WEAKNESS: 1
FEVER: 0

## 2018-08-11 ASSESSMENT — VISUAL ACUITY
OU: NORMAL ACUITY
OU: NORMAL ACUITY

## 2018-08-11 NOTE — ED PROVIDER NOTES
History     Chief Complaint:  Numbness    HPI   Radha Fuentes is a 43 year old female who presents with numbness. The patient stated that she was recently diagnosed with a vertebral artery dissection on the 9th of August, just two days ago. She was told to return if her symptoms of neck pain and headaches worsened or if she developed any new neurologic symptoms, and was given Perocet and Aspirin for follow up care. This morning, within the last 45 minutes, the patient shared that she started to notice tingling in her fingertips, that eventually radiated up the arms, more dominantly in the left resulting in loss of  strength. Along with the extremity numbness, her  started to notice left sided facial drooping, prompting them to visit the ED. The patient stated that she had a headache last night, but denies any other associated symptoms. She too a 325 mg Aspirin this morning.    Allergies:  No Known Drug Allergies    Medications:    Flexeril  Metformin  Amerge  Percocet  Prednisone  Zoloft  Imitrex    Past Medical History:    Gallstone  Migraine  PCOS    Past Surgical History:    Arthroplasty knee  Breast implants  Cholecystectomy  Tonsillectomy    Family History:    Heart disease  Aneurysm   Father- Marfan's Syndrome    Social History:  Marital Status:   Smoking Status: Former  Alcohol Use: No    Review of Systems   Constitutional: Negative for activity change and fever.   Cardiovascular: Negative for chest pain.   Gastrointestinal: Negative for abdominal pain.   Neurological: Positive for weakness, numbness and headaches.   All other systems reviewed and are negative.      Physical Exam     Patient Vitals for the past 24 hrs:   BP Temp Temp src Heart Rate Resp SpO2 Height Weight   08/11/18 1415 (!) 140/97 - - - 15 99 % - -   08/11/18 1339 - 98.5  F (36.9  C) Oral - - - - -   08/11/18 1330 (!) 146/99 - - 86 - 100 % - -   08/11/18 1315 (!) 138/99 - - 90 - 100 % - -   08/11/18 1300 (!) 147/99 - -  "82 - 100 % - -   08/11/18 1245 (!) 151/116 - - 95 12 - - -   08/11/18 1230 - - - - - 100 % - -   08/11/18 1215 (!) 157/109 - - - - - - -   08/11/18 1210 (!) 154/121 - - - - - - -   08/11/18 1209 - - - - - 100 % - -   08/11/18 1208 - - - - - 100 % - -   08/11/18 1207 - - - - - 100 % - -   08/11/18 1206 - - - - - 100 % - -   08/11/18 1205 (!) 148/107 - - - - - - -   08/11/18 1139 (!) 175/118 - - 120 - 100 % 1.778 m (5' 10\") 74.8 kg (165 lb)   08/11/18 1137 (!) 175/118 - - - - - - -       Physical Exam    Nursing note and vitals reviewed.    Constitutional: Pleasant and well groomed.  Normal lean habitus         HENT:    Mouth/Throat: Oropharynx is without swelling or erythema. Oral mucosa moist.    Eyes: Conjunctivae are normal. No scleral icterus.    Neck: Neck supple.   Cardiovascular: Normal rate, regular rhythm and intact distal pulses.    Pulmonary/Chest: Effort normal and breath sounds normal.   Abdominal: Soft.  No distension. There is no tenderness.   Musculoskeletal:  No edema, No calf tenderness.  Neurological:Alert and oriented. Coordination normal.  Subtle weakness of left hand, no drift. Strength otherwise intact. No asymmetry with lower extremity strength. CN II-XII intact x possible left forehead weakness (may be complicated by the fact that patient receives botox) reports altered sensation bilateral hands. Finger nose Finger smooth.  Skin: Skin is warm and dry.   Psychiatric: Normal mood and appropriate affect.       Emergency Department Course     EKG:  ECG taken at 1141, ECG read at 1154  Suspect arm lead reversal, interpretation assumes no reversal  Unusual CP axis, possible ectopic atrial tachycardia  Left posterior fascicular block   Septal infarct, age undetermined  Abnormal ECG  Rate 115 bpm. MN interval 130. QRS duration 76. QT/QTc 294/406. P-R-T axes 150 147 150.  ECG technical COMPLICATIONS. READ BELOW EKG FOR TRUE RESULTS    ECG taken at 1557, ECG read at 1600  Normal Sinus Rhythm  Normal " ECG  Rate 87 bpm. MO interval 134. QRS duration 74. QT/QTc 346/416. P-R-T axes 60 56 37.    Imaging:  Radiographic findings were communicated with the patient who voiced understanding of the findings.    CT Head w Contrast:  1. Diffuse dissection of the left vertebral artery from the V1 to V3  segments. The dissection does not appear to extend intracranially.  This dissection was also present on previous neck MRA 8/9/2018. There  are scattered areas of stenosis secondary to the dissection without  evidence of occlusion.  2. Dissection of the right vertebral artery centered in its V3  segment. This dissection does not appear to extend intracranially.  This may have been present on prior MRA; however, this area is very  difficult to evaluate on MRA.  3. The carotid arteries appear widely patent without evidence of  dissection or significant stenosis.  4. Patent proximal major intracranial arteries without vascular  cutoff, stenosis, or aneurysm.  5. Unremarkable CT perfusion images of the head.  Per Radiologist.     CTA Head Neck w Contrast:  1. Diffuse dissection of the left vertebral artery from the V1 to V3  segments. The dissection does not appear to extend intracranially.  This dissection was also present on previous neck MRA 8/9/2018. There  are scattered areas of stenosis secondary to the dissection without  evidence of occlusion.  2. Dissection of the right vertebral artery centered in its V3  segment. This dissection does not appear to extend intracranially.  This may have been present on prior MRA; however, this area is very  difficult to evaluate on MRA.  3. The carotid arteries appear widely patent without evidence of  dissection or significant stenosis.  4. Patent proximal major intracranial arteries without vascular  cutoff, stenosis, or aneurysm.  5. Unremarkable CT perfusion images of the head.  Per Radiologist.    CT Head w/o Contrast:  No evidence of acute intracranial hemorrhage, mass,  or  herniation. CT angiogram and CT perfusion to follow.  Per Radiologist.     MRI Brain    FINDINGS: There is no evidence of hemorrhage, mass, acute infarct, or  anomaly. The brain parenchyma, ventricles and subarachnoid spaces  appear normal.      There is no abnormal intracranial enhancement.      The facial structures appear normal.      Known vertebral artery dissections much better evaluated by CTA.         IMPRESSION:  Normal MRI of the brain.        Laboratory:  BMP: Calcium 8.4 (L), o/w WNL. (Creatinine 0.76)   CBC: WNL. (WBC 8.2, HGB 14.8, )   INR: 0.93  Partial thromboplastin time: 25  Troponin 1: <0.015  Glucose: 73    Interventions:  1148: 120 mLs Isovue-370 IV  1148: NS 1L IV Bolus  1522: 10 mLs Gadavist IV    Emergency Department Course:  Past medical records, nursing notes, and vitals reviewed.  1138: I was called to room for Stroke evaluation. I performed an exam of the patient and obtained history, as documented above. Called Stroke Code  1148: Spoke with Dr. Goldstein regarding patient status  Dr. Hawk called with preliminary results.   1219: Undergoing tele stroke evaluation.  Consulted with Dr. Goldstein who requested MRI prior to transfer. Agreed with aorta imaging. Plan Plavix for anticoagulation if aorta is normal.   1331: Spoke with Dr. Stevens regarding previous [facility] care and current care steps.  1438: Spoke with Dr. Hawk who states no indication to perform a head or neck MRA and complicates aorta imagin    The patient was taken for head CT and CTA, see imaging results above.   IV inserted and blood drawn.    admitted to hospital    Impression & Plan      Medical Decision Making:  Radha Fuentes is a 43 year old female who presents to the ED for evaluation of left sided facial weaknesses, left upper extremity paraesthesia in the setting of having been diagnosed with vertebral artery dissection two days ago. On arrival, her neurologic exam was remarkable for paraesthesia and  possible subtle left hand weakness but there was no obvious facial droop. A Code Stroke was activated and her evaluation was expedited. I consulted with Dr. Goldstein who performed a tele med evaluation of patient as well. Due to recent history and neuro symptoms despite the negative unchanged CTA and negative CT of the brain I will plan to admit for observation and neurology consultation, however neurology requested an MRI prior to transfer. Additionally,as the patient has a family history of Marfan's syndrome and no clear explanation for the dissection, an MRA of the chest was ordered as well. She denies any classic symptoms of aortic dissection and was hemodynamically stable and therefore I felt that we had time to obtain MRA. This was negative for dissection/aneurysm. After this report was received Plavix 300 mg was administered per neurology recommendation. MRI of the brain was unremarkable.  I've spoken with Dr. Stevens who has accepted direct admission to [facility] where she can be evaluated for neurology at the recommendation of Dr. Goldstein.      Diagnosis:    ICD-10-CM   1. Vertebral artery dissection (H) I77.74   2. History of CVA (cerebrovascular accident) possible TIA Z86.73       Disposition:  Transfer via [facility] to [facility] for direct admission to neurology unit for ongoing Stroke Neurology Consultation and management.    Discharge Medications:  New Prescriptions    No medications on file       Doug Peterson  8/11/2018   [facility] EMERGENCY DEPARTMENT    IDoug, am serving as a scribe at 11:38 AM on 8/11/2018 to document services personally performed by Yvonne Clancy* based on my observations and the provider's statements to me.        Yvonne Clancy MD  08/12/18 [id number]

## 2018-08-11 NOTE — H&P
[facility]  History and Physical   Hospitalist Service  8/11/2018  Monique Devi PA-C pager [phone]    Radha Fuentes MRN# [id number]   YOB: 1975 Age: 43 year old      Date of Admission:  08/11/18      Primary Care Physician: Sumi Carreon [phone]      CHIEF COMPLAINT: Left Arm Numbness    HISTORY OBTAINED: Patient and chart review    HPI  aRdha Fuentes is a 44 yo female with a PMH of migraines and PCOS who is transferred from [facility] in [place] for evaluation of left vertebral artery dissection.  She was initially seen in the emergency department 8/9 after a outpatient MRI of the neck was concerning for a vertebral artery dissection.  She had been suffering from left neck and shoulder pain for the past 2 weeks with no acute injury.  She was seen by orthopedics and initially treated with a muscle relaxer and oxycodone.  When her symptoms did not improve an MRI was obtained which showed no spinal abnormalities but was concerning for vertebral artery dissection.  She was sent to the emergency department on 8/9.  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  She was evaluated by neurology in the emergency department.  At that time she had no other symptoms other than neck pain.  She was discharged on aspirin 325 mg daily with plans to follow-up in clinic in 4-6 weeks.    Today she represented to the emergency department due to new onset neurologic symptoms.  She developed some tingling in her left hand with associated decreased  strength.  Her  was also concern for a left-sided facial droop.  CTA was obtained which again showed left vertebral artery dissection as well as possible healing right vertebral artery dissection.  MRI of the brain and MRA of the chest were negative.  She was again evaluated by neurology who recommended that the patient continue on her  aspirin as well as be loaded with Plavix and initiate Plavix 75 mg daily.  Her symptoms had primarily resolved but it was recommended that she be observed overnight and further evaluated.    Presently, patient is evaluated in her hospital room. Continues to have some numbness on the dorsal aspect of her left forearm and hand. Denies HA. NO other concerns or complaints      PAST MEDICAL HISTORY  Past Medical History:   Diagnosis Date     Gallstone      Migraine     sees neurologist-      PCOS (polycystic ovarian syndrome)     on metformin       PAST SURGICAL HISTORY  Past Surgical History:   Procedure Laterality Date     ARTHROSCOPY KNEE  1991    left     Breast reconstruction with implants  1998     CHOLECYSTECTOMY  2010     TONSILLECTOMY  1996       HOME MEDICATIONS  Prior to Admission medications    Medication Sig Last Dose Taking? Auth Provider   cyclobenzaprine (FLEXERIL) 10 MG tablet Take 10 mg by mouth 3 times daily as needed for muscle spasms   Unknown, Entered By History   metFORMIN (GLUCOPHAGE-XR) 500 MG 24 hr tablet Take 2,000 mg by mouth At Bedtime   Unknown, Entered By History   naratriptan (AMERGE) 2.5 MG tablet Take 1 tablet (2.5 mg) by mouth at onset of headache for migraine May repeat in 4 hours. Max 2 tablets/24 hours.   Sumi Carreon MD   oxyCODONE-acetaminophen (PERCOCET) 5-325 MG per tablet Take 1 tablet by mouth every 6 hours as needed for pain   Unknown, Entered By History   PREDNISONE PO On taper:   Started on 8/7: 60 mg x3 days (3x 20 mg). So today (8/9) is last day of the 60 mg  Then 40 mg (2x20 mg) x3 days  Then 20 mg x.... Then stop (pt did not have full instruction.)   Unknown, Entered By History   sertraline (ZOLOFT) 50 MG tablet Take 50 mg by mouth At Bedtime   Unknown, Entered By History   SUMAtriptan (IMITREX) 100 MG tablet Take 1 tablet (100 mg) by mouth at onset of headache for migraine May repeat in 2 hours. Max 2 tablets/24 hours.   Sumi Carreon MD        ALLERGIES  No Known Allergies    SOCIAL HISTORY   reports that she has quit smoking. She has never used smokeless tobacco. She reports that she does not drink alcohol or use illicit drugs.    FAMILY HISTORY  family history includes Aneurysm in her mother; HEART DISEASE in her mother; Marfan Syndrome in her father.    REVIEW OF SYSTEMS  A 10 point ROS was negative other than the symptoms noted above in the HPI.    PHYSICAL EXAM  There were no vitals taken for this visit.  Gen: sitting in bed, alert, cooperative and in no acute distress  HEENT: normocephalic; oropharynx clear; thyroid not enlarged  Card: RRR, S1, S2, no murmurs  Resp: lungs clear to auscultation bilaterally  GI: abdomen soft, not-tender, non-distended, no organomegaly, +BS  MSK: normal muscle tone, no LE edema  Neuro: CX II-XII  in tact; ROM in all four extremities grossly in tact.  strength 5/5 and symmetric. Finger extension strength 5/5 R, 4/5 left. Flexion/Extension of bicep/tricep 5/5 and symmetric. Dorsal plantar flexion 5/5 and symmetric.   Psych: alert and oriented x3; normal affect  Heme/Lymph: no supraclavicular or cervical adenopathy  Skin: warm, no suspicious rashes or lesions noted    DATA  Laboratory Results:  Recent Labs   Lab Test  08/11/18   1145  08/09/18   1144   WBC  8.2  8.2   HGB  14.8  13.6   MCV  92  91   PLT  331  306   INR  0.93  0.86      Recent Labs   Lab Test  08/11/18   1145  08/09/18   1144   NA  137  137   POTASSIUM  3.7  3.6   CHLORIDE  103  103   CO2  25  27   BUN  14  16   CR  0.76  0.60   ANIONGAP  9  7   KANE  8.4*  9.3   GLC  77  80     Recent Labs   Lab Test  08/11/18   1145   TROPI  <0.015       Imaging Results:   Personally reviewed today as per Epic.   Recent Results (from the past 24 hour(s))   CT Head w/o Contrast    Narrative    CT SCAN OF THE HEAD WITHOUT CONTRAST   8/11/2018 12:02 PM     HISTORY: Code stroke.     TECHNIQUE:  Axial images of the head and coronal reformations without  IV contrast  material. Radiation dose for this scan was reduced using  automated exposure control, adjustment of the mA and/or kV according  to patient size, or iterative reconstruction technique.    COMPARISON: None.    FINDINGS: There is no evidence of intracranial hemorrhage, mass, or  anomaly. The ventricles are normal in size, shape and configuration.  The brain parenchyma and subarachnoid spaces are normal.     The visualized portions of the sinuses and mastoids appear normal. The  bony calvarium and bones of the skull base appear intact.       Impression    IMPRESSION:  No evidence of acute intracranial hemorrhage, mass, or  herniation. CT angiogram and CT perfusion to follow.    KULDEEP JOSE MD   CTA Head Neck with Contrast    Narrative    CT ANGIOGRAM OF THE HEAD AND NECK WITH CONTRAST  8/11/2018 12:03 PM     HISTORY: Code stroke.      TECHNIQUE:  CT angiography with an injection of 70 mL Isovue-370  (accession [id number]), 120 mL Isovue-370 (accession [id number]) IV with  scans through the head and neck. Images were transferred to a separate  3-D workstation where multiplanar reformations and 3-D images were  created. Estimates of carotid stenoses are made relative to the distal  internal carotid artery diameters except as noted. Radiation dose for  this scan was reduced using automated exposure control, adjustment of  the mA and/or kV according to patient size, or iterative  reconstruction technique. Perfusion scans were performed at three  levels with injection of additional IV nonionic contrast and saline  flush.  These images were processed on a separate 3-D workstation.     COMPARISON: MRA neck 8/9/2018.     CT HEAD FINDINGS: No contrast enhancing lesions. CT perfusion images  of the head are unremarkable.     CT ANGIOGRAM HEAD FINDINGS:  The major intracranial arteries including  the proximal branches of the anterior cerebral, middle cerebral, and  posterior cerebral arteries appear patent without vascular cutoff.  No  aneurysm identified. No significant stenosis.  Venous circulation is  unremarkable.    CT ANGIOGRAM NECK FINDINGS: Normal origin of the great vessels from  the aortic arch.     Right carotid artery: The right common and internal carotid arteries  are patent. No significant stenosis or atherosclerotic disease in the  carotid artery.     Left carotid artery: The left common and internal carotid arteries are  patent. No significant stenosis or atherosclerotic disease in the  carotid artery.     Vertebral arteries: The vertebral arteries appear patent without  evidence of occlusion.    Again seen is diffuse irregularity and stenosis of the left vertebral  artery throughout its V2 segment also involving the distal V1 and  proximal V3 segments. Findings remain compatible with dissection.    There is irregularity and stenosis with the dissection flap involving  the right V3 segment as well as the distal right V2 segment. The  dissection does not appear to extend intracranially.    Other findings: Enhancing 5 mm nodule in the left thyroid lobe.  Management per the guidelines as follows: In a patient without known  thyroid disease, an incidental thyroid nodule without  microcalcifications measuring <1.0 cm in size in a patient <35 years  old or <1.5 cm in a patient >35 years old is most likely benign and  does not typically require followup.       Impression    IMPRESSION:   1. Diffuse dissection of the left vertebral artery from the V1 to V3  segments. The dissection does not appear to extend intracranially.  This dissection was also present on previous neck MRA 8/9/2018. There  are scattered areas of stenosis secondary to the dissection without  evidence of occlusion.  2. Dissection of the right vertebral artery centered in its V3  segment. This dissection does not appear to extend intracranially.  This may have been present on prior MRA; however, this area is very  difficult to evaluate on MRA.  3. The carotid  arteries appear widely patent without evidence of  dissection or significant stenosis.  4. Patent proximal major intracranial arteries without vascular  cutoff, stenosis, or aneurysm.  5. Unremarkable CT perfusion images of the head.    Results discussed with Yvonne Clancy at 12:17 PM on 8/11/2018.    KULDEEP JOSE MD   CT Head w Contrast    Narrative    CT ANGIOGRAM OF THE HEAD AND NECK WITH CONTRAST  8/11/2018 12:03 PM     HISTORY: Code stroke.      TECHNIQUE:  CT angiography with an injection of 70 mL Isovue-370  (accession [id number]), 120 mL Isovue-370 (accession [id number]) IV with  scans through the head and neck. Images were transferred to a separate  3-D workstation where multiplanar reformations and 3-D images were  created. Estimates of carotid stenoses are made relative to the distal  internal carotid artery diameters except as noted. Radiation dose for  this scan was reduced using automated exposure control, adjustment of  the mA and/or kV according to patient size, or iterative  reconstruction technique. Perfusion scans were performed at three  levels with injection of additional IV nonionic contrast and saline  flush.  These images were processed on a separate 3-D workstation.     COMPARISON: MRA neck 8/9/2018.     CT HEAD FINDINGS: No contrast enhancing lesions. CT perfusion images  of the head are unremarkable.     CT ANGIOGRAM HEAD FINDINGS:  The major intracranial arteries including  the proximal branches of the anterior cerebral, middle cerebral, and  posterior cerebral arteries appear patent without vascular cutoff. No  aneurysm identified. No significant stenosis.  Venous circulation is  unremarkable.    CT ANGIOGRAM NECK FINDINGS: Normal origin of the great vessels from  the aortic arch.     Right carotid artery: The right common and internal carotid arteries  are patent. No significant stenosis or atherosclerotic disease in the  carotid artery.     Left carotid artery: The left common and  internal carotid arteries are  patent. No significant stenosis or atherosclerotic disease in the  carotid artery.     Vertebral arteries: The vertebral arteries appear patent without  evidence of occlusion.    Again seen is diffuse irregularity and stenosis of the left vertebral  artery throughout its V2 segment also involving the distal V1 and  proximal V3 segments. Findings remain compatible with dissection.    There is irregularity and stenosis with the dissection flap involving  the right V3 segment as well as the distal right V2 segment. The  dissection does not appear to extend intracranially.    Other findings: Enhancing 5 mm nodule in the left thyroid lobe.  Management per the guidelines as follows: In a patient without known  thyroid disease, an incidental thyroid nodule without  microcalcifications measuring <1.0 cm in size in a patient <35 years  old or <1.5 cm in a patient >35 years old is most likely benign and  does not typically require followup.       Impression    IMPRESSION:   1. Diffuse dissection of the left vertebral artery from the V1 to V3  segments. The dissection does not appear to extend intracranially.  This dissection was also present on previous neck MRA 8/9/2018. There  are scattered areas of stenosis secondary to the dissection without  evidence of occlusion.  2. Dissection of the right vertebral artery centered in its V3  segment. This dissection does not appear to extend intracranially.  This may have been present on prior MRA; however, this area is very  difficult to evaluate on MRA.  3. The carotid arteries appear widely patent without evidence of  dissection or significant stenosis.  4. Patent proximal major intracranial arteries without vascular  cutoff, stenosis, or aneurysm.  5. Unremarkable CT perfusion images of the head.    Results discussed with Yvonne Clancy at 12:17 PM on 8/11/2018.    KULDEEP JOSE MD   MRA Chest wo & w Contrast    Narrative    MRA CHEST WITHOUT AND  WITH CONTRAST   8/11/2018 3:25 PM     HISTORY: Vertebral artery dissection, family history of Marfan's.     TECHNIQUE: Multiplanar multisequence MRI. 10 mL IV Gadavist.    COMPARISON: CT angiogram today.    FINDINGS: The aortic arch and its branches appear normal. The  descending aorta appears normal with no evidence of aneurysm or  dissection. The elongated stenosis of the left vertebral artery from  dissection is again identified. Bilateral breast implants are present.      Impression    IMPRESSION:  1. Normal MRI of the thoracic aorta and its proximal branches.  2. Persistent mild diffuse stenosis of the left vertebral artery from  previously identified dissection.   MR Brain w/o & w Contrast    Narrative    MRI BRAIN WITHOUT AND WITH CONTRAST  8/11/2018 3:26 PM    HISTORY:  Dissection, left-sided paresthesia.      TECHNIQUE:  Multiplanar, multisequence MRI of the brain without and  with 10 mL Gadavist.    COMPARISON: Head CT from earlier the same day.    FINDINGS: There is no evidence of hemorrhage, mass, acute infarct, or  anomaly. The brain parenchyma, ventricles and subarachnoid spaces  appear normal.     There is no abnormal intracranial enhancement.     The facial structures appear normal.     Known vertebral artery dissections much better evaluated by CTA.      Impression    IMPRESSION:  Normal MRI of the brain.      KULDEEP JOSE MD       ASSESSMENT AND PLAN: Radha Fuentes is a 43 year old female with a past medical history of migraines and PCO as who was transferred from [facility] for ongoing treatment and evaluation of left vertebral artery dissection.    Left upper extremity numbness/weakness, resolved  Left vertebral artery dissection: Initially diagnosed 8/9 and discharged on full dose aspirin per neurology due to no other symptoms at that time.  Re-presented today with left-sided weakness and numbness and possible facial droop.  MRI of the brain negative.  CTA shows unchanged left vertebral  artery dissection of V2.   --Admit to observation  --Neurochecks every 4  --Aspirin 325 mg daily as well as Plavix 300 mg ×1 followed by 75 mg daily  --As needed hydralazine and labetalol available for SBP greater than 200  --Echocardiogram  --PT/OT/speech consults  --Neurology consult    Right vertebral artery dissection: Noted on CTA. Does not extend intracranially. Thought to be healing per Neurology  -- Plan as above    Elevated blood pressure without history of hypertension: No history of hypertension treatment. Notes that recently has been creeping up. Elevated in ER with SBP 170s, improved without treatment though remains 130-140s.  --As needed labetalol and hydralazine are available per stroke protocol.  --Monitor. If persistantly elevated will need to consider oral antihypertensives      Family H/o Marfan's: Father has Marfan's, with AAA that is monitored. Patient had Echo during her pregnancy 3 years ago that was normal.  -- MRA of chest negative for disection  -- TTE    Migraines:   -- Will avoid triptans in the setting of vertebral artery disection  -- Muscle relaxant, tylenol, and tramadol are available    PCOS  -- Hold Metformin, can resume at discharge      DVT Prophylaxis: Pneumatic Compression Devices    Code Status: Full

## 2018-08-11 NOTE — IP AVS SNAPSHOT
[facility]    [address]    [place] [address]    Phone:  [phone]                                       After Visit Summary   8/11/2018    Radha Fuentes    MRN: [id number]           After Visit Summary Signature Page     I have received my discharge instructions, and my questions have been answered. I have discussed any challenges I see with this plan with the nurse or doctor.    ..........................................................................................................................................  Patient/Patient Representative Signature      ..........................................................................................................................................  Patient Representative Print Name and Relationship to Patient    ..................................................               ................................................  Date                                            Time    ..........................................................................................................................................  Reviewed by Signature/Title    ...................................................              ..............................................  Date                                                            Time

## 2018-08-11 NOTE — IP AVS SNAPSHOT
MRN:[id number]                      After Visit Summary   8/11/2018    Radha Fuentes    MRN: [id number]           Thank you!     Thank you for choosing [facility] for your care. Our goal is always to provide you with excellent care. Hearing back from our patients is one way we can continue to improve our services. Please take a few minutes to complete the written survey that you may receive in the mail after you visit with us. Thank you!        Patient Information     Date Of Birth          1975        About your hospital stay     You were admitted on:  August 11, 2018 You last received care in the:  [facility] [address] [facility]    You were discharged on:  August 12, 2018        Reason for your hospital stay       Evaluation of the your suspected mini stroke (TIA), which was thought to have occurred because of a dissection in your vertebral artery.                  Who to Call     For medical emergencies, please call 911.  For non-urgent questions about your medical care, please call your primary care provider or clinic, [phone]          Attending Provider     Provider Specialty    Joslyn Stevens MD Internal Medicine    [facility]       Primary Care Provider Office Phone # Fax #    Sumi Shoaib Carreon [phone]      After Care Instructions     Activity       Your activity upon discharge: activity as tolerated            Diet       Follow this diet upon discharge: Regular            Discharge Instructions       1. Avoid rapid neck twists and excessive stretching of the neck (to avoid further dissections)  2. Avoid taking Imitrex from this point forward.                  Follow-up Appointments     Follow-up and recommended labs and tests        1. Follow up with [facility] on 8/14/18 as scheduled.  2. You will need to follow up with neurology in clinic in 3 months with a repeat head scan (MRA of  your head/neck).  3. Follow up with your PCP in the next 1-2 weeks. No labs needed.                  Further instructions from your care team       Your risk factors for stroke or TIA (transient ischemic attack):    Your Risk Factors Your Results Normal Ranges   High blood pressure BP Readings from Last 1 Encounters:   08/12/18 (!) 128/93    Less than 120/80   Cholesterol              Total Lab Results   Component Value Date    CHOL 186 01/05/2011      Less than 150    Triglycerides   Lab Results   Component Value Date    TRIG 85 01/05/2011    Less than 150   LDL Lab Results   Component Value Date     01/05/2011       Less than 70   HDL Lab Results   Component Value Date    HDL 67 01/05/2011            Greater than 40 (men)  Greater than 50 (women)   Diabetes   Recent Labs  Lab 08/11/18  1145   GLC 77    Fasting blood glucose    Smoking/tobacco use  Quit smoking and tobacco   Overweight  Lose 1-2 pounds a week   Lack of exercise  30 minutes moderate activity each day   Other risk factors include carotid (neck) artery disease, atrial fibrillation and stress. You may be on new medicine to treat high blood pressure, cholesterol, diabetes or atrial fibrillation.    Understanding Stroke Booklet given to patient. Please refer to booklet for further information.    Stroke warning signs and symptoms - CALL 911 right away for:  - Sudden numbness or weakness in the face, arm or leg (often on one side of the body).  - Sudden confusion or trouble understanding what is going on.  - Sudden blurred or decreased vision in one or both eyes.  - Sudden trouble speaking, loss of balance, dizziness or problems with coordination.  - Sudden, severe headache for no reason.  - Fainting or seizures.  - Symptoms may go away then come back suddenly.    Please follow up with neurology in 3 months. You may call any of the following neurology clinics for an appointment or a clinic of your choice. Tell them you were recently  hospitalized and need follow up as recommended at discharge.    1. [facility]   [address], [address]   [place], [place] [address] [phone]  2. [facility]   [address]   [place] [address]   [phone]  3. [facility]   Edy Higgins MD   [address] [place], [place] [id number] [phone]  4. [facility]   Edy Higgins MD   [address] [place] [address]   [phone]      Neurology also recommends follow-up MRA to be completed in 3 months.     Avoid rapid neck twists and excessive stretching of the neck (to avoid further dissections)  Avoid taking Imitrex from this point forward.       Pending Results     Date and Time Order Name Status Description    8/11/2018 1147 EKG 12 lead Preliminary             Statement of Approval     Ordered          08/12/18 1329  I have reviewed and agree with all the recommendations and orders detailed in this document.  EFFECTIVE NOW     Approved and electronically signed by:  Ludy Stevens DO             Admission Information     Date & Time Provider Department Dept. Phone    8/11/2018 Ed Loya MD [address] [facility] [phone]      Your Vitals Were     Blood Pressure Pulse Temperature Respirations Pulse Oximetry       128/93 79 98.9  F (37.2  C) (Oral) 16 99%       MyChart Information     [facility] gives you secure access to your electronic health record. If you see a primary care provider, you can also send messages to your care team and make appointments. If you have questions, please call your primary care clinic.  If you do not have a primary care provider, please call [phone] and they will assist you.        Care EveryWhere ID     This is your Care EveryWhere ID. This could be used by other organizations to access your [facility] medical records  [id number]        Equal Access to Services      RAMIREZ [facility]: Hadii aad ku hadshivo Soaristeoali, waaxda luqadaha, qaybta kaalmada adeegyada, bigg arun ritan freda zanjluis corral . So [facility] [phone].    ATENCIÓN: Si habla español, tiene a mcfarland disposición servicios gratuitos de asistencia lingüística. Llame al [phone].    We comply with applicable federal civil rights laws and [place] laws. We do not discriminate on the basis of race, color, national origin, age, disability, sex, sexual orientation, or gender identity.               Review of your medicines      START taking        Dose / Directions    aspirin 325 MG tablet        Dose:  325 mg   Take 1 tablet (325 mg) by mouth daily   Quantity:  30 tablet   Refills:  0       clopidogrel 75 MG tablet   Commonly known as:  PLAVIX        Dose:  75 mg   Take 1 tablet (75 mg) by mouth daily   Quantity:  30 tablet   Refills:  0         CONTINUE these medicines which have NOT CHANGED        Dose / Directions    cyclobenzaprine 10 MG tablet   Commonly known as:  FLEXERIL        Dose:  10 mg   Take 10 mg by mouth 3 times daily as needed for muscle spasms   Refills:  0       metFORMIN 500 MG 24 hr tablet   Commonly known as:  GLUCOPHAGE-XR        Dose:  2000 mg   Take 2,000 mg by mouth At Bedtime   Refills:  0       oxyCODONE-acetaminophen 5-325 MG per tablet   Commonly known as:  PERCOCET   Notes to Patient:  Received oxycodone 5 mg, tylenol 650 mg last at 1:06 PM today.        Dose:  1 tablet   Take 1 tablet by mouth every 6 hours as needed for pain   Refills:  0       PREDNISONE PO        On taper:  Started on 8/7: 60 mg x3 days (3x 20 mg). So today (8/9) is last day of the 60 mg Then 40 mg (2x20 mg) x3 days Then 20 mg x.... Then stop (pt did not have full instruction.)   Refills:  0       sertraline 50 MG tablet   Commonly known as:  ZOLOFT        Dose:  50 mg   Take 50 mg by mouth At Bedtime   Refills:  0         STOP taking     naratriptan 2.5 MG tablet   Commonly known as:  AMERGE           SUMAtriptan 100  MG tablet   Commonly known as:  IMITREX                Where to get your medicines      These medications were sent to [facility] [place], [place] - [address]  [address] [place] [address]     Phone:  [phone]     aspirin 325 MG tablet    clopidogrel 75 MG tablet                Protect others around you: Learn how to safely use, store and throw away your medicines at www.disposemymeds.org.             Medication List: This is a list of all your medications and when to take them. Check marks below indicate your daily home schedule. Keep this list as a reference.      Medications           Morning Afternoon Evening Bedtime As Needed    aspirin 325 MG tablet   Take 1 tablet (325 mg) by mouth daily   Last time this was given:  325 mg on 8/12/2018  7:59 AM                                   clopidogrel 75 MG tablet   Commonly known as:  PLAVIX   Take 1 tablet (75 mg) by mouth daily   Last time this was given:  75 mg on 8/12/2018  7:59 AM                                   cyclobenzaprine 10 MG tablet   Commonly known as:  FLEXERIL   Take 10 mg by mouth 3 times daily as needed for muscle spasms   Last time this was given:  10 mg on 8/12/2018  8:55 AM                                   metFORMIN 500 MG 24 hr tablet   Commonly known as:  GLUCOPHAGE-XR   Take 2,000 mg by mouth At Bedtime                                   oxyCODONE-acetaminophen 5-325 MG per tablet   Commonly known as:  PERCOCET   Take 1 tablet by mouth every 6 hours as needed for pain   Notes to Patient:  Received oxycodone 5 mg, tylenol 650 mg last at 1:06 PM today.                                   PREDNISONE PO   On taper:  Started on 8/7: 60 mg x3 days (3x 20 mg). So today (8/9) is last day of the 60 mg Then 40 mg (2x20 mg) x3 days Then 20 mg x.... Then stop (pt did not have full instruction.)                                sertraline 50 MG tablet   Commonly known as:  ZOLOFT   Take 50 mg by mouth At Bedtime                                              More Information        Clopidogrel tablets  Brand Name: Plavix  What is this medicine?  CLOPIDOGREL (kloh PID oh grel) helps to prevent blood clots. This medicine is used to prevent heart attack, stroke, or other vascular events in people who are at high risk.  How should I use this medicine?  Take this medicine by mouth with a glass of water. Follow the directions on the prescription label. You may take this medicine with or without food. If it upsets your stomach, take it with food. Take your medicine at regular intervals. Do not take it more often than directed. Do not stop taking except on your doctor's advice.  A special MedGuide will be given to you by the pharmacist with each prescription and refill. Be sure to read this information carefully each time.  Talk to your pediatrician regarding the use of this medicine in children. Special care may be needed.  What side effects may I notice from receiving this medicine?  Side effects that you should report to your doctor or health care professional as soon as possible:    allergic reactions like skin rash, itching or hives, swelling of the face, lips, or tongue    signs and symptoms of bleeding such as bloody or black, tarry stools; red or dark-brown urine; spitting up blood or brown material that looks like coffee grounds; red spots on the skin; unusual bruising or bleeding from the eye, gums, or nose    signs and symptoms of a blood clot such as breathing problems; changes in vision; chest pain; severe, sudden headache; pain, swelling, warmth in the leg; trouble speaking; sudden numbness or weakness of the face, arm or leg  Side effects that usually do not require medical attention (report to your doctor or health care professional if they continue or are bothersome):    constipation    diarrhea    headache    upset stomach  What may interact with this medicine?  Do not take this medicine with the following  medications:    dasabuvir; ombitasvir; paritaprevir; ritonavir    defibrotide  This medicine may also interact with the following medications:    antiviral medicines for HIV or AIDS    aspirin    certain medicines for depression like citalopram, fluoxetine, fluvoxamine    certain medicines for fungal infections like ketoconazole, fluconazole, voriconazole    certain medicines for seizures like felbamate, oxcarbazepine, phenytoin    certain medicines for stomach problems like cimetidine, omeprazole, esomeprazole    certain medicines that treat or prevent blood clots like warfarin, enoxaparin, dalteparin, apixaban, dabigatran, rivaroxaban, ticlopidine    chloramphenicol    cilostazol    fluvastatin    isoniazid    modafinil    nicardipine    NSAIDS, medicines for pain and inflammation, like ibuprofen or naproxen    quinine    repaglinide    tamoxifen    tolbutamide    topiramate    torsemide  What if I miss a dose?  If you miss a dose, take it as soon as you can. If it is almost time for your next dose, take only that dose. Do not take double or extra doses.  Where should I keep my medicine?  Keep out of the reach of children.  Store at room temperature of 59 to 86 degrees F (15 to 30 degrees C). Throw away any unused medicine after the expiration date.  What should I tell my health care provider before I take this medicine?  They need to know if you have any of the following conditions:    bleeding disorders    bleeding in the brain    having surgery    history of stomach bleeding    an unusual or allergic reaction to clopidogrel, other medicines, foods, dyes, or preservatives    pregnant or trying to get pregnant    breast-feeding  What should I watch for while using this medicine?  Visit your doctor or health care professional for regular check ups. Do not stop taking your medicine unless your doctor tells you to.  Notify your doctor or health care professional and seek emergency treatment if you develop breathing  problems; changes in vision; chest pain; severe, sudden headache; pain, swelling, warmth in the leg; trouble speaking; sudden numbness or weakness of the face, arm or leg. These can be signs that your condition has gotten worse.  If you are going to have surgery or dental work, tell your doctor or health care professional that you are taking this medicine.  Certain genetic factors may reduce the effect of this medicine. Your doctor may use genetic tests to determine treatment.  NOTE:This sheet is a summary. It may not cover all possible information. If you have questions about this medicine, talk to your doctor, pharmacist, or health care provider. Copyright  2018 [employer]vier

## 2018-08-11 NOTE — CONSULTS
[facility]      Stroke Code Note    Radha Fuentes is a 43 year old female.    A stroke code was activated due to Left side weakness/numbness    Stroke Code Data  Time notified 11:39   Time of first response 11:41   Time tele service began 11:50   Time tele service ended 12:29   Last known normal 10:00   Time of discovery (or onset of symptoms)  10:00   Time head CT read by me 11:50   Was stroke code de-escalated? No       Telestroke Service Details  Type of service telemedicine diagnostic assessment of acute neurological changes   Reason telemedicine is appropriate patient required immediate assistance from specialist   Mode of transmission secure interactive audio and video communication per Zoila   Originating site (patient location) [facility]    Distant site (provider location) [facility]       TPA Treatment   Not given due to minor / isolated / quickly resolving stroke symptoms.    Endovascular Treatment  Not initiated due to absence of proximal vessel occlusion    Stroke Scales      National Institutes of Health Stroke Scale (at presentation)   NIHSS done at:  time patient seen      Score    Level of consciousness:  (0)   Alert, keenly responsive    LOC questions:  (0)   Answers both questions correctly    LOC commands:  (0)   Performs both tasks correctly    Best gaze:  (0)   Normal    Visual:  (0)   No visual loss    Facial palsy:  (0)   Normal symmetrical movements    Motor arm (left):  (0)   No drift    Motor arm (right):  (0)   No drift    Motor leg (left):  (0)   No drift    Motor leg (right):  (0)   No drift    Limb ataxia:  (0)   Absent    Sensory:  (1)   Mild to moderate sensory loss    Best language:  (0)   Normal- no aphasia    Dysarthria:  (0)   Normal    Extinction and inattention:  (0)   No abnormality        NIHSS Total Score:  1      Pupils equal, Left eyelid without ptosis (there was a suspicion of ptosis initially)  No facial weakness, no  drift. 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 It probably started 2-3 weeks ago when she had the neck pain starting after trying to carry a heavy thing. Started on ASA 325mg 3 days ago. Today presented with feeling numb/tingling left hand, with weakness of left face and arm. Her weakness resolved in the ED. She had NIHSS1 for mild sensory loss. CTA: we again see the left V2 dissection, and there is a Rt V3 irregularity its probably a healing dissection (radiology thinks it was probably there in the prior MRI).     Dx:  Left Vert dissection presenting with slightly reduced left body sensation. Probably a small stroke vs a TIA.     Recommendations  Acute Ischemic Stroke vs a TIA (without tPA) Recommendations  - Neurochecks  - Aim for normal tension. PRN HTN treatment is SBP > 200  - Transfer to [facility] after MRI completion for overnight observation.  - ASA 325mg po daily  - Load with plavix 300mg po x1, then plavix 75mg po daily  - MRI/MRA Stroke Protocol, with MRI fat sat  - TTE (screening her dad had Marfan syndrome)  - PT/OT/SLP  - PM&R  - Stroke Education    Eloise Goldstein MD       I spent 60 minutes of critical care time supervising the patient's code stroke activation.   I personally reviewed all relevant labs and neuroimaging.

## 2018-08-12 ENCOUNTER — APPOINTMENT (OUTPATIENT)
Dept: CARDIOLOGY | Facility: CLINIC | Age: 43
End: 2018-08-12
Attending: PHYSICIAN ASSISTANT
Payer: COMMERCIAL

## 2018-08-12 VITALS
SYSTOLIC BLOOD PRESSURE: 128 MMHG | HEART RATE: 79 BPM | TEMPERATURE: 98.9 F | DIASTOLIC BLOOD PRESSURE: 93 MMHG | RESPIRATION RATE: 16 BRPM | OXYGEN SATURATION: 99 %

## 2018-08-12 PROCEDURE — 25000132 ZZH RX MED GY IP 250 OP 250 PS 637: Performed by: INTERNAL MEDICINE

## 2018-08-12 PROCEDURE — 25000132 ZZH RX MED GY IP 250 OP 250 PS 637: Performed by: PHYSICIAN ASSISTANT

## 2018-08-12 PROCEDURE — 40000894 ZZH STATISTIC OT IP EVAL DEFER: Performed by: REHABILITATION PRACTITIONER

## 2018-08-12 PROCEDURE — 99213 OFFICE O/P EST LOW 20 MIN: CPT | Performed by: PSYCHIATRY & NEUROLOGY

## 2018-08-12 PROCEDURE — 99217 ZZC OBSERVATION CARE DISCHARGE: CPT | Performed by: INTERNAL MEDICINE

## 2018-08-12 PROCEDURE — G0378 HOSPITAL OBSERVATION PER HR: HCPCS

## 2018-08-12 RX ORDER — CLOPIDOGREL BISULFATE 75 MG/1
75 TABLET ORAL DAILY
Qty: 30 TABLET | Refills: 0 | Status: SHIPPED | OUTPATIENT
Start: 2018-08-12 | End: 2020-03-03

## 2018-08-12 RX ORDER — AMOXICILLIN 250 MG
1-2 CAPSULE ORAL 2 TIMES DAILY
Status: DISCONTINUED | OUTPATIENT
Start: 2018-08-12 | End: 2018-08-12 | Stop reason: HOSPADM

## 2018-08-12 RX ORDER — ASPIRIN 325 MG
325 TABLET ORAL DAILY
Qty: 30 TABLET | Refills: 0 | Status: SHIPPED | OUTPATIENT
Start: 2018-08-12 | End: 2018-08-21

## 2018-08-12 RX ORDER — POLYETHYLENE GLYCOL 3350 17 G/17G
17 POWDER, FOR SOLUTION ORAL DAILY
Status: DISCONTINUED | OUTPATIENT
Start: 2018-08-12 | End: 2018-08-12 | Stop reason: HOSPADM

## 2018-08-12 RX ORDER — BISACODYL 10 MG
10 SUPPOSITORY, RECTAL RECTAL DAILY PRN
Status: DISCONTINUED | OUTPATIENT
Start: 2018-08-12 | End: 2018-08-12 | Stop reason: HOSPADM

## 2018-08-12 RX ADMIN — ACETAMINOPHEN 650 MG: 325 TABLET, FILM COATED ORAL at 13:06

## 2018-08-12 RX ADMIN — CLOPIDOGREL 75 MG: 75 TABLET, FILM COATED ORAL at 07:59

## 2018-08-12 RX ADMIN — OXYCODONE HYDROCHLORIDE 5 MG: 5 TABLET ORAL at 07:58

## 2018-08-12 RX ADMIN — POLYETHYLENE GLYCOL 3350 17 G: 17 POWDER, FOR SOLUTION ORAL at 10:46

## 2018-08-12 RX ADMIN — ACETAMINOPHEN 650 MG: 325 TABLET, FILM COATED ORAL at 07:58

## 2018-08-12 RX ADMIN — SENNOSIDES AND DOCUSATE SODIUM 2 TABLET: 8.6; 5 TABLET ORAL at 10:46

## 2018-08-12 RX ADMIN — OXYCODONE HYDROCHLORIDE 5 MG: 5 TABLET ORAL at 13:06

## 2018-08-12 RX ADMIN — ASPIRIN 325 MG ORAL TABLET 325 MG: 325 PILL ORAL at 07:59

## 2018-08-12 RX ADMIN — CYCLOBENZAPRINE HYDROCHLORIDE 10 MG: 10 TABLET, FILM COATED ORAL at 08:55

## 2018-08-12 ASSESSMENT — VISUAL ACUITY
OU: NORMAL ACUITY

## 2018-08-12 NOTE — PLAN OF CARE
Problem: Patient Care Overview  Goal: Plan of Care/Patient Progress Review  PT:  Discharge Planner PT   Patient plan for discharge: Return home  Current status: Orders received, chart reviewed. Pt admitted with TIA and symptoms have now resolved. Pt is up independently and moving well, no need for PT to see while inpatient.   Barriers to return to prior living situation: None  Recommendations for discharge: Return home  Rationale for recommendations: Safe mobility, no skilled therapy needs present.       Entered by: Lori Izaguirre 08/12/2018 9:36 AM

## 2018-08-12 NOTE — PLAN OF CARE
Problem: Patient Care Overview  Goal: Plan of Care/Patient Progress Review  Outcome: No Change  A&O. VSS. Neuro's intact. States that numbness/tinglining to left arm has resolved. Still complains of neck pain. Declined pain medication. Tele NSR.

## 2018-08-12 NOTE — PLAN OF CARE
Problem: Patient Care Overview  Goal: Plan of Care/Patient Progress Review  Discharge Planner OT   Patient plan for discharge: Home with A of  as needed  Current status: Pt is moving I'ly, reports no changes in balance or unsteadiness when on her feet. LUE symptoms have resolved, pt is no longer experiencing any numbness or coordination deficits. Pt reports no changes in vision or cognition. Per conversation with pt and nursing, skilled OT intervention is not warranted at this time. Pt educated on signs to watch for at home, and to seek out outpatient OT if pt becomes aware of higher level deficits as pt returns to normal task demands.  Barriers to return to prior living situation: None noted  Recommendations for discharge: Home with A of  as needed  Rationale for recommendations: Skilled OT intervention is not indicated at this time, as pt appears to have returned to baseline function.       Entered by: Mia Parisi 08/12/2018 9:35 AM

## 2018-08-12 NOTE — DISCHARGE INSTRUCTIONS
Your risk factors for stroke or TIA (transient ischemic attack):    Your Risk Factors Your Results Normal Ranges   High blood pressure BP Readings from Last 1 Encounters:   08/12/18 (!) 128/93    Less than 120/80   Cholesterol              Total Lab Results   Component Value Date    CHOL 186 01/05/2011      Less than 150    Triglycerides   Lab Results   Component Value Date    TRIG 85 01/05/2011    Less than 150   LDL Lab Results   Component Value Date     01/05/2011       Less than 70   HDL Lab Results   Component Value Date    HDL 67 01/05/2011            Greater than 40 (men)  Greater than 50 (women)   Diabetes   Recent Labs  Lab 08/11/18  1145   GLC 77    Fasting blood glucose    Smoking/tobacco use  Quit smoking and tobacco   Overweight  Lose 1-2 pounds a week   Lack of exercise  30 minutes moderate activity each day   Other risk factors include carotid (neck) artery disease, atrial fibrillation and stress. You may be on new medicine to treat high blood pressure, cholesterol, diabetes or atrial fibrillation.    Understanding Stroke Booklet given to patient. Please refer to booklet for further information.    Stroke warning signs and symptoms - CALL 911 right away for:  - Sudden numbness or weakness in the face, arm or leg (often on one side of the body).  - Sudden confusion or trouble understanding what is going on.  - Sudden blurred or decreased vision in one or both eyes.  - Sudden trouble speaking, loss of balance, dizziness or problems with coordination.  - Sudden, severe headache for no reason.  - Fainting or seizures.  - Symptoms may go away then come back suddenly.    Please follow up with neurology in 3 months. You may call any of the following neurology clinics for an appointment or a clinic of your choice. Tell them you were recently hospitalized and need follow up as recommended at discharge.    1. [facility]   [address], [address]   [place]  [address]   [phone]  2. [facility]   [address]   [place] [address]   [phone]  3. [facility] Higgins [facility]   [address] [place], [place] [id number] [phone]  4. [facility] Higgins MD   [address]   [place], [place] [id number] [phone]      Neurology also recommends follow-up MRA to be completed in 3 months.     Avoid rapid neck twists and excessive stretching of the neck (to avoid further dissections)  Avoid taking Imitrex from this point forward.

## 2018-08-12 NOTE — PLAN OF CARE
Problem: Patient Care Overview  Goal: Plan of Care/Patient Progress Review  Outcome: Improving  Patient is A&O, VSS and neuro intact, up independently, regular diet, IV SL, Oxycodone and Tylenol given for L side neck pain, and ice applied. On Tele and still having mild numbness on L hand/fingers . Will continue to monitor

## 2018-08-12 NOTE — PLAN OF CARE
Problem: Patient Care Overview  Goal: Plan of Care/Patient Progress Review  Discharge Planner SLP   Patient plan for discharge: Home  Current status: Patient admitted with TIA symptoms that have resolved. Passed the swallow screen tolerating a regular diet and thin liquids. Speech/language is intact in conversation. No skilled intervention is indicated at this time.   Barriers to return to prior living situation: None  Recommendations for discharge: Home with her .  Rationale for recommendations: Home no needs. Will complete the orders.        Entered by: Wendy Edwards 08/12/2018 10:27 AM

## 2018-08-12 NOTE — DISCHARGE SUMMARY
[facility]    Discharge Summary  Hospitalist    Date of Admission:  8/11/2018  Date of Discharge:  8/12/2018  Discharging Provider: Ludy Stevens    Discharge Diagnoses   TIA dt left vertebral artery dissection   Right vertebral artery dissection  Elevated blood pressure without history of hypertension  Family hx of Marfan's  Migraines  PCOS    History of Present Illness   Radha Fuentes is a 43 year old female with a past medical history of migraines and PCOS as who was transferred from [facility] for ongoing treatment and evaluation of left vertebral artery dissection.    Hospital Course   Radha Fuentes was admitted on 8/11/2018.  The following problems were addressed during her hospitalization:    TIA dt left vertebral artery dissection:   Vertebral artery dissection was initially diagnosed during ED visit on 8/9 for evaluation of neck pain. Was seen in the ED by neurology, as she had no other neurologic symptoms at time she was ultimately discharged on full dose aspirin. Re-presented the day of admission with left-sided weakness and numbness and possible facial droop. MRI of the brain negative for stroke. CTA showed unchanged left vertebral artery dissection of V2. Admitted to [facility] for ongoing evaluation. MRA chest showed persistent mild diffuse stenosis of the left vertebral artery. Neuro symptoms resolved. Per neurology recommendations, Plavix was added to full dose aspirin. No PT/OT/SLP needs identified as patient returned to baseline. Echocardiogram not felt to be needed per neurology.      Discharged with recommendations to continue aspirin 325mg daily and Plavix 75mg daily for the next 3 months.   Will need to follow up with neurology in clinic in 3 mths and PCP in the next week.      Right vertebral artery dissection:   Noted on CTA. Does not extend intracranially. Thought to be healing per Neurology     Elevated blood pressure without history of hypertension:   No  prior hx of hypertension. BPs remained stable throughout short stay.  Recommend to follow up with PCP in clinic for ongoing BP monitoring.       Family Hx of Marfan's:   Father has Marfan's, with AAA that is monitored. Patient had echo during her pregnancy 3 years ago that was normal -- not repeated this stay. MRA of chest negative for disection     Migraines:   Recommended to avoid triptans in the setting of vertebral artery dissection  Scheduled to follow up with [facility] in the coming days -- will discuss options for ongoing management of migraines with neurologist at that time.     PCOS  Chronic and stable on Metformin    Discharge Pain Plan:  Patient currently has NO PAIN and is not being prescribed pain medications on discharge.    Ludy Stevens    Code Status   Full Code       Primary Care Physician   Sumi Carreon    Physical Exam   Temp: 98.9  F (37.2  C) Temp src: Oral BP: (!) 128/93 Pulse: 79 Heart Rate: 89 Resp: 16 SpO2: 99 % O2 Device: None (Room air)    There were no vitals filed for this visit.  Vital Signs with Ranges  Temp:  [97.7  F (36.5  C)-98.9  F (37.2  C)] 98.9  F (37.2  C)  Pulse:  [79-80] 79  Heart Rate:  [79-90] 89  Resp:  [15-18] 16  BP: (117-146)/(72-99) 128/93  SpO2:  [97 %-100 %] 99 %  I/O last 3 completed shifts:  In: 200 [P.O.:200]  Out: -     General: Resting comfortably, alert, conversive, NAD  CVS: HRRR, no MGR, no LE edema  Respiratory: CTAB, no wheeze/rales/rhonchi, no increased work of breathing  GI: S, NT, ND, +BS  Skin: Warm/dry   Neuro: CNs 2-12 intact, no focal motor or sensory deficits    Discharge Disposition   Discharged to home  Condition at discharge: Stable    Consultations This Hospital Stay   NEUROLOGY IP CONSULT  PHYSICAL THERAPY ADULT IP CONSULT  OCCUPATIONAL THERAPY ADULT IP CONSULT  SPEECH LANGUAGE PATH ADULT IP CONSULT  SWALLOW EVAL SPEECH PATH AT BEDSIDE IP CONSULT    Time Spent on this Encounter   Ludy MOURA  Rodney, personally saw the patient today and spent less than or equal to 30 minutes discharging this patient.    Discharge Orders     Reason for your hospital stay   Evaluation of the your suspected mini stroke (TIA), which was thought to have occurred because of a dissection in your vertebral artery.     Discharge Instructions   1. Avoid rapid neck twists and excessive stretching of the neck (to avoid further dissections)  2. Avoid taking Imitrex from this point forward.     Activity   Your activity upon discharge: activity as tolerated     Follow-up and recommended labs and tests    1. You will need to follow up with neurology in clinic in 3 months with a repeat head scan (MRA of your head/neck).  2. Follow up with your PCP in the next 1-2 weeks. No labs needed.     Diet   Follow this diet upon discharge: Regular       Discharge Medications   Current Discharge Medication List      START taking these medications    Details   aspirin 325 MG tablet Take 1 tablet (325 mg) by mouth daily  Qty: 30 tablet, Refills: 0    Associated Diagnoses: Vertebral artery dissection (H)      clopidogrel (PLAVIX) 75 MG tablet Take 1 tablet (75 mg) by mouth daily  Qty: 30 tablet, Refills: 0    Associated Diagnoses: Vertebral artery dissection (H)         CONTINUE these medications which have NOT CHANGED    Details   cyclobenzaprine (FLEXERIL) 10 MG tablet Take 10 mg by mouth 3 times daily as needed for muscle spasms      metFORMIN (GLUCOPHAGE-XR) 500 MG 24 hr tablet Take 2,000 mg by mouth At Bedtime      oxyCODONE-acetaminophen (PERCOCET) 5-325 MG per tablet Take 1 tablet by mouth every 6 hours as needed for pain      PREDNISONE PO On taper:   Started on 8/7: 60 mg x3 days (3x 20 mg). So today (8/9) is last day of the 60 mg  Then 40 mg (2x20 mg) x3 days  Then 20 mg x.... Then stop (pt did not have full instruction.)      sertraline (ZOLOFT) 50 MG tablet Take 50 mg by mouth At Bedtime         STOP taking these medications        naratriptan (AMERGE) 2.5 MG tablet Comments:   Reason for Stopping:         SUMAtriptan (IMITREX) 100 MG tablet Comments:   Reason for Stopping:             Allergies   No Known Allergies     Data   Most Recent 3 CBC's:  Recent Labs   Lab Test  08/11/18   1145  08/09/18   1144  03/01/18   1336   WBC  8.2  8.2  4.2   HGB  14.8  13.6  13.8   MCV  92  91  91   PLT  331  306  288      Most Recent 3 BMP's:  Recent Labs   Lab Test  08/11/18   1145  08/09/18   1144 04/23/17  01/12/15  01/05/11   1153   NA  137  137   --    --   136.0  140   POTASSIUM  3.7  3.6  4.3   --   4.0  4.3   CHLORIDE  103  103   --    --   102.0  103   CO2  25  27   --    --    --   27   BUN  14  16   --    --   12.0  15   CR  0.76  0.60  0.70   --   0.7  0.67   ANIONGAP  9  7   --    --   9.9  11   KANE  8.4*  9.3   --    --   8.8  9.4   GLC  77  80  89   < >  87.0  86    < > = values in this interval not displayed.     Most Recent 3 Troponin's:  Recent Labs   Lab Test  08/11/18   1145   TROPI  <0.015     Results for orders placed or performed during the hospital encounter of 08/11/18   CT Head w/o Contrast    Narrative    CT SCAN OF THE HEAD WITHOUT CONTRAST   8/11/2018 12:02 PM     HISTORY: Code stroke.     TECHNIQUE:  Axial images of the head and coronal reformations without  IV contrast material. Radiation dose for this scan was reduced using  automated exposure control, adjustment of the mA and/or kV according  to patient size, or iterative reconstruction technique.    COMPARISON: None.    FINDINGS: There is no evidence of intracranial hemorrhage, mass, or  anomaly. The ventricles are normal in size, shape and configuration.  The brain parenchyma and subarachnoid spaces are normal.     The visualized portions of the sinuses and mastoids appear normal. The  bony calvarium and bones of the skull base appear intact.       Impression    IMPRESSION:  No evidence of acute intracranial hemorrhage, mass, or  herniation. CT angiogram and CT perfusion  to follow.    KULDEEP JOSE MD   CTA Head Neck with Contrast    Narrative    CT ANGIOGRAM OF THE HEAD AND NECK WITH CONTRAST  8/11/2018 12:03 PM     HISTORY: Code stroke.      TECHNIQUE:  CT angiography with an injection of 70 mL Isovue-370  (accession [id number]), 120 mL Isovue-370 (accession [id number]) IV with  scans through the head and neck. Images were transferred to a separate  3-D workstation where multiplanar reformations and 3-D images were  created. Estimates of carotid stenoses are made relative to the distal  internal carotid artery diameters except as noted. Radiation dose for  this scan was reduced using automated exposure control, adjustment of  the mA and/or kV according to patient size, or iterative  reconstruction technique. Perfusion scans were performed at three  levels with injection of additional IV nonionic contrast and saline  flush.  These images were processed on a separate 3-D workstation.     COMPARISON: MRA neck 8/9/2018.     CT HEAD FINDINGS: No contrast enhancing lesions. CT perfusion images  of the head are unremarkable.     CT ANGIOGRAM HEAD FINDINGS:  The major intracranial arteries including  the proximal branches of the anterior cerebral, middle cerebral, and  posterior cerebral arteries appear patent without vascular cutoff. No  aneurysm identified. No significant stenosis.  Venous circulation is  unremarkable.    CT ANGIOGRAM NECK FINDINGS: Normal origin of the great vessels from  the aortic arch.     Right carotid artery: The right common and internal carotid arteries  are patent. No significant stenosis or atherosclerotic disease in the  carotid artery.     Left carotid artery: The left common and internal carotid arteries are  patent. No significant stenosis or atherosclerotic disease in the  carotid artery.     Vertebral arteries: The vertebral arteries appear patent without  evidence of occlusion.    Again seen is diffuse irregularity and stenosis of the left  vertebral  artery throughout its V2 segment also involving the distal V1 and  proximal V3 segments. Findings remain compatible with dissection.    There is irregularity and stenosis with the dissection flap involving  the right V3 segment as well as the distal right V2 segment. The  dissection does not appear to extend intracranially.    Other findings: Enhancing 5 mm nodule in the left thyroid lobe.  Management per the guidelines as follows: In a patient without known  thyroid disease, an incidental thyroid nodule without  microcalcifications measuring <1.0 cm in size in a patient <35 years  old or <1.5 cm in a patient >35 years old is most likely benign and  does not typically require followup.       Impression    IMPRESSION:   1. Diffuse dissection of the left vertebral artery from the V1 to V3  segments. The dissection does not appear to extend intracranially.  This dissection was also present on previous neck MRA 8/9/2018. There  are scattered areas of stenosis secondary to the dissection without  evidence of occlusion.  2. Dissection of the right vertebral artery centered in its V3  segment. This dissection does not appear to extend intracranially.  This may have been present on prior MRA; however, this area is very  difficult to evaluate on MRA.  3. The carotid arteries appear widely patent without evidence of  dissection or significant stenosis.  4. Patent proximal major intracranial arteries without vascular  cutoff, stenosis, or aneurysm.  5. Unremarkable CT perfusion images of the head.    Results discussed with Yvonne Clancy at 12:17 PM on 8/11/2018.    KULDEEP JOSE MD   CT Head w Contrast    Narrative    CT ANGIOGRAM OF THE HEAD AND NECK WITH CONTRAST  8/11/2018 12:03 PM     HISTORY: Code stroke.      TECHNIQUE:  CT angiography with an injection of 70 mL Isovue-370  (accession [id number]), 120 mL Isovue-370 (accession [id number]) IV with  scans through the head and neck. Images were transferred to a  separate  3-D workstation where multiplanar reformations and 3-D images were  created. Estimates of carotid stenoses are made relative to the distal  internal carotid artery diameters except as noted. Radiation dose for  this scan was reduced using automated exposure control, adjustment of  the mA and/or kV according to patient size, or iterative  reconstruction technique. Perfusion scans were performed at three  levels with injection of additional IV nonionic contrast and saline  flush.  These images were processed on a separate 3-D workstation.     COMPARISON: MRA neck 8/9/2018.     CT HEAD FINDINGS: No contrast enhancing lesions. CT perfusion images  of the head are unremarkable.     CT ANGIOGRAM HEAD FINDINGS:  The major intracranial arteries including  the proximal branches of the anterior cerebral, middle cerebral, and  posterior cerebral arteries appear patent without vascular cutoff. No  aneurysm identified. No significant stenosis.  Venous circulation is  unremarkable.    CT ANGIOGRAM NECK FINDINGS: Normal origin of the great vessels from  the aortic arch.     Right carotid artery: The right common and internal carotid arteries  are patent. No significant stenosis or atherosclerotic disease in the  carotid artery.     Left carotid artery: The left common and internal carotid arteries are  patent. No significant stenosis or atherosclerotic disease in the  carotid artery.     Vertebral arteries: The vertebral arteries appear patent without  evidence of occlusion.    Again seen is diffuse irregularity and stenosis of the left vertebral  artery throughout its V2 segment also involving the distal V1 and  proximal V3 segments. Findings remain compatible with dissection.    There is irregularity and stenosis with the dissection flap involving  the right V3 segment as well as the distal right V2 segment. The  dissection does not appear to extend intracranially.    Other findings: Enhancing 5 mm nodule in the left  thyroid lobe.  Management per the guidelines as follows: In a patient without known  thyroid disease, an incidental thyroid nodule without  microcalcifications measuring <1.0 cm in size in a patient <35 years  old or <1.5 cm in a patient >35 years old is most likely benign and  does not typically require followup.       Impression    IMPRESSION:   1. Diffuse dissection of the left vertebral artery from the V1 to V3  segments. The dissection does not appear to extend intracranially.  This dissection was also present on previous neck MRA 8/9/2018. There  are scattered areas of stenosis secondary to the dissection without  evidence of occlusion.  2. Dissection of the right vertebral artery centered in its V3  segment. This dissection does not appear to extend intracranially.  This may have been present on prior MRA; however, this area is very  difficult to evaluate on MRA.  3. The carotid arteries appear widely patent without evidence of  dissection or significant stenosis.  4. Patent proximal major intracranial arteries without vascular  cutoff, stenosis, or aneurysm.  5. Unremarkable CT perfusion images of the head.    Results discussed with Yvonne Clancy at 12:17 PM on 8/11/2018.    KULDEEP JOSE MD   MR Brain w/o & w Contrast    Narrative    MRI BRAIN WITHOUT AND WITH CONTRAST  8/11/2018 3:26 PM    HISTORY:  Dissection, left-sided paresthesia.      TECHNIQUE:  Multiplanar, multisequence MRI of the brain without and  with 10 mL Gadavist.    COMPARISON: Head CT from earlier the same day.    FINDINGS: There is no evidence of hemorrhage, mass, acute infarct, or  anomaly. The brain parenchyma, ventricles and subarachnoid spaces  appear normal.     There is no abnormal intracranial enhancement.     The facial structures appear normal.     Known vertebral artery dissections much better evaluated by CTA.      Impression    IMPRESSION:  Normal MRI of the brain.      KULDEEP JOSE MD   MRA Chest wo & w Contrast     Narrative    MRA CHEST WITHOUT AND WITH CONTRAST   8/11/2018 3:25 PM     HISTORY: Vertebral artery dissection, family history of Marfan's.     TECHNIQUE: Multiplanar multisequence MRI. 10 mL IV Gadavist.    COMPARISON: CT angiogram today.    FINDINGS: The aortic arch and its branches appear normal. The  descending aorta appears normal with no evidence of aneurysm or  dissection. The elongated stenosis of the left vertebral artery from  dissection is again identified. Bilateral breast implants are present.      Impression    IMPRESSION:  1. Normal MRI of the thoracic aorta and its proximal branches.  2. Persistent mild diffuse stenosis of the left vertebral artery from  previously identified dissection.    CASSANDRA WHITT MD

## 2018-08-12 NOTE — PLAN OF CARE
Problem: Patient Care Overview  Goal: Plan of Care/Patient Progress Review  Outcome: Adequate for Discharge Date Met: 08/12/18  A&Ox4. Denies N/T. Fine motor deficit LUE. Up independently. C/o neck pain, decreased with tylenol, oxycodone and flexeril as well as ice. Tele NSR. Regular diet. Discharge medications, instructions, and follow up reviewed with pt and spouse prior to discharge.

## 2018-08-12 NOTE — PLAN OF CARE
Problem: Patient Care Overview  Goal: Plan of Care/Patient Progress Review  Outcome: No Change  DA from [facility]. A&Ox4. LUE fine motor deficit, slow finger to nose, LUE numbnes fingers to low forearm, LLE numbness top of foot. Reg diet. Up independently. Tele NSR. C/o neck discomfort, no intervention. MRI neg. Plan for ECHO tomorrow and neuro to see.

## 2018-08-13 ENCOUNTER — TELEPHONE (OUTPATIENT)
Dept: FAMILY MEDICINE | Facility: CLINIC | Age: 43
End: 2018-08-13

## 2018-08-13 LAB — INTERPRETATION ECG - MUSE: NORMAL

## 2018-08-13 NOTE — TELEPHONE ENCOUNTER
"Hospital/TCU/ED for chronic condition Discharge Protocol    \"Hi, my name is Michell Luis, a registered nurse, and I am calling from [facility].  I am calling to follow up and see how things are going for you after your recent emergency visit/hospital/TCU stay.\"    Tell me how you are doing now that you are home?\" Doing ok, pain is tolerable with 1 oxycodone at night. Will take Tylenol during the day to help with pain, advised her no NSAIDs due to Plavix medication. The Pt reports she did take 2 Aleve this morning, she was not aware she cannot take NSAIDs. Advised her to monitor for any unusual bleeding or bruising over next 24-48 hours.       Discharge Instructions    \"Let's review your discharge instructions.  What is/are the follow-up recommendations?  Pt. Response: Pt is following up with Neurology on Tuesday, and Endo on Wed. Was told she has a small nodule on her thyroid, will first discuss with Endo.     \"Has an appointment with your primary care provider been scheduled?\"   Yes. (confirm) 8/21/18    \"When you see the provider, I would recommend that you bring your medications with you.\"    Medications    \"Tell me what changed about your medicines when you discharged?\"    Changes to chronic meds?    0-1    \"What questions do you have about your medications?\"    None     New diagnoses of heart failure, COPD, diabetes, or MI?    No              Medication reconciliation completed? Yes  Was MTM referral placed (*Make sure to put transitions as reason for referral)?   No    Call Summary    \"What questions or concerns do you have about your recent visit and your follow-up care?\"     none    \"If you have questions or things don't continue to improve, we encourage you contact us through the main clinic number (give number).  Even if the clinic is not open, triage nurses are available 24/7 to help you.     We would like you to know that our clinic has extended hours (provide information).  We also have " "urgent care (provide details on closest location and hours/contact info)\"      \"Thank you for your time and take care!\"    Michell Lusi RN -- [facility]                  "

## 2018-08-13 NOTE — TELEPHONE ENCOUNTER
ED / Discharge Outreach Protocol    Patient Contact    Attempt # 1    Was call answered?  No.  Left message on voicemail with information to call me back.    Follow-up and recommended labs and tests          1. Follow up with [facility] on 8/14/18 as scheduled.  2. You will need to follow up with neurology in clinic in 3 months with a repeat head scan (MRA of your head/neck).  3. Follow up with your PCP in the next 1-2 weeks. No labs needed.             Tyler Perez RN, BSN

## 2018-08-21 ENCOUNTER — OFFICE VISIT (OUTPATIENT)
Dept: FAMILY MEDICINE | Facility: CLINIC | Age: 43
End: 2018-08-21
Payer: COMMERCIAL

## 2018-08-21 VITALS
OXYGEN SATURATION: 99 % | SYSTOLIC BLOOD PRESSURE: 131 MMHG | TEMPERATURE: 98.9 F | DIASTOLIC BLOOD PRESSURE: 86 MMHG | HEART RATE: 85 BPM | BODY MASS INDEX: 24.29 KG/M2 | WEIGHT: 169.3 LBS

## 2018-08-21 DIAGNOSIS — I80.8 SUPERFICIAL THROMBOPHLEBITIS OF RIGHT UPPER EXTREMITY: ICD-10-CM

## 2018-08-21 DIAGNOSIS — Z09 HOSPITAL DISCHARGE FOLLOW-UP: Primary | ICD-10-CM

## 2018-08-21 DIAGNOSIS — I77.74 VERTEBRAL ARTERY DISSECTION (H): ICD-10-CM

## 2018-08-21 DIAGNOSIS — G43.809 OTHER MIGRAINE WITHOUT STATUS MIGRAINOSUS, NOT INTRACTABLE: ICD-10-CM

## 2018-08-21 LAB
INTERPRETATION ECG - MUSE: NORMAL
INTERPRETATION ECG - MUSE: NORMAL

## 2018-08-21 PROCEDURE — 99495 TRANSJ CARE MGMT MOD F2F 14D: CPT | Performed by: FAMILY MEDICINE

## 2018-08-21 RX ORDER — TRIAMCINOLONE ACETONIDE 1 MG/G
CREAM TOPICAL
Refills: 0 | COMMUNITY
Start: 2018-03-01 | End: 2018-11-16

## 2018-08-21 RX ORDER — TRETINOIN 0.5 MG/G
CREAM TOPICAL
Refills: 11 | COMMUNITY
Start: 2017-12-19 | End: 2020-03-03

## 2018-08-21 RX ORDER — NARATRIPTAN 2.5 MG/1
TABLET ORAL
Refills: 0 | COMMUNITY
Start: 2018-07-11 | End: 2018-08-21

## 2018-08-21 RX ORDER — METHOCARBAMOL 500 MG/1
TABLET, FILM COATED ORAL
COMMUNITY
Start: 2018-03-20 | End: 2020-03-03

## 2018-08-21 RX ORDER — ZONISAMIDE 100 MG/1
CAPSULE ORAL
Refills: 1 | COMMUNITY
Start: 2017-09-12 | End: 2018-11-16

## 2018-08-21 RX ORDER — CHLORPROMAZINE HYDROCHLORIDE 25 MG/1
TABLET, FILM COATED ORAL
Refills: 2 | COMMUNITY
Start: 2017-12-04 | End: 2018-11-16

## 2018-08-21 RX ORDER — SUMATRIPTAN 100 MG/1
TABLET, FILM COATED ORAL
Refills: 3 | COMMUNITY
Start: 2018-07-10 | End: 2018-08-21

## 2018-08-21 RX ORDER — NAPROXEN 500 MG/1
500 TABLET ORAL 2 TIMES DAILY PRN
Qty: 30 TABLET | Refills: 1 | Status: SHIPPED | OUTPATIENT
Start: 2018-08-21 | End: 2019-01-03

## 2018-08-21 RX ORDER — TRAMADOL HYDROCHLORIDE 50 MG/1
TABLET ORAL
Refills: 0 | COMMUNITY
Start: 2018-08-14 | End: 2020-03-03

## 2018-08-21 NOTE — MR AVS SNAPSHOT
After Visit Summary   8/21/2018    Radha Fuentes    MRN: [id number]           Patient Information     Date Of Birth          1975        Visit Information        Provider Department      8/21/2018 1:40 PM Sumi Carreon MD [facility]        Today's Diagnoses     Vertebral artery dissection (H)    -  1    Other migraine without status migrainosus, not intractable        Superficial thrombophlebitis of right upper extremity          Care Instructions    Naproxen once daily and hot pack twice daily to right arm          Follow-ups after your visit        Who to contact     If you have questions or need follow up information about today's clinic visit or your schedule please contact [facility] directly at [phone].  Normal or non-critical lab and imaging results will be communicated to you by scroll kithart, letter or phone within 4 business days after the clinic has received the results. If you do not hear from us within 7 days, please contact the clinic through scroll kithart or phone. If you have a critical or abnormal lab result, we will notify you by phone as soon as possible.  Submit refill requests through OkCupid or call your pharmacy and they will forward the refill request to us. Please allow 3 business days for your refill to be completed.          Additional Information About Your Visit        MyChart Information     OkCupid gives you secure access to your electronic health record. If you see a primary care provider, you can also send messages to your care team and make appointments. If you have questions, please call your primary care clinic.  If you do not have a primary care provider, please call [phone] and they will assist you.        Care EveryWhere ID     This is your Care EveryWhere ID. This could be used by other organizations to access your [place] medical records  [id number]        Your Vitals Were     Pulse Temperature Last Period Pulse  Oximetry Breastfeeding? BMI (Body Mass Index)    85 98.9  F (37.2  C) (Oral) 08/19/2018 (Approximate) 99% No 24.29 kg/m2       Blood Pressure from Last 3 Encounters:   08/21/18 131/86   08/12/18 (!) 128/93   08/11/18 (!) 139/93    Weight from Last 3 Encounters:   08/21/18 169 lb 4.8 oz (76.8 kg)   08/11/18 165 lb (74.8 kg)   08/09/18 162 lb (73.5 kg)              Today, you had the following     No orders found for display         Today's Medication Changes          These changes are accurate as of 8/21/18  2:27 PM.  If you have any questions, ask your nurse or doctor.               Start taking these medicines.        Dose/Directions    naproxen 500 MG tablet   Commonly known as:  NAPROSYN   Used for:  Other migraine without status migrainosus, not intractable   Started by:  Sumi Carreon MD        Dose:  500 mg   Take 1 tablet (500 mg) by mouth 2 times daily as needed for moderate pain   Quantity:  30 tablet   Refills:  1         These medicines have changed or have updated prescriptions.        Dose/Directions    aspirin 81 MG tablet   This may have changed:  Another medication with the same name was removed. Continue taking this medication, and follow the directions you see here.   Changed by:  Sumi Carreon MD        Dose:  81 mg   Take 81 mg by mouth daily   Refills:  0         Stop taking these medicines if you haven't already. Please contact your care team if you have questions.     naratriptan 2.5 MG tablet   Commonly known as:  AMERGE   Stopped by:  Sumi Carreon MD           SUMAtriptan 100 MG tablet   Commonly known as:  IMITREX   Stopped by:  Sumi Carreon MD                Where to get your medicines      These medications were sent to [facility] [address] [place] [address] [facility] AT [address] [address] [facility], [facility] [address]     Phone:  [phone]     naproxen 500 MG tablet               Information about OPIOIDS      PRESCRIPTION OPIOIDS: WHAT YOU NEED TO KNOW   We gave you an opioid (narcotic) pain medicine. It is important to manage your pain, but opioids are not always the best choice. You should first try all the other options your care team gave you. Take this medicine for as short a time (and as few doses) as possible.    Some activities can increase your pain, such as bandage changes or therapy sessions. It may help to take your pain medicine 30 to 60 minutes before these activities. Reduce your stress by getting enough sleep, working on hobbies you enjoy and practicing relaxation or meditation. Talk to your care team about ways to manage your pain beyond prescription opioids.    These medicines have risks:    DO NOT drive when on new or higher doses of pain medicine. These medicines can affect your alertness and reaction times, and you could be arrested for driving under the influence (DUI). If you need to use opioids long-term, talk to your care team about driving.    DO NOT operate heavy machinery    DO NOT do any other dangerous activities while taking these medicines.    DO NOT drink any alcohol while taking these medicines.     If the opioid prescribed includes acetaminophen, DO NOT take with any other medicines that contain acetaminophen. Read all labels carefully. Look for the word  acetaminophen  or  Tylenol.  Ask your pharmacist if you have questions or are unsure.    You can get addicted to pain medicines, especially if you have a history of addiction (chemical, alcohol or substance dependence). Talk to your care team about ways to reduce this risk.    All opioids tend to cause constipation. Drink plenty of water and eat foods that have a lot of fiber, such as fruits, vegetables, prune juice, apple juice and high-fiber cereal. Take a laxative (Miralax, milk of magnesia, Colace, Senna) if you don t move your bowels at least every other day. Other side effects include upset stomach, sleepiness, dizziness,  throwing up, tolerance (needing more of the medicine to have the same effect), physical dependence and slowed breathing.    Store your pills in a secure place, locked if possible. We will not replace any lost or stolen medicine. If you don t finish your medicine, please throw away (dispose) as directed by your pharmacist. The [place] Pollution [employer] has more information about safe disposal: [email or web address]://[email or web address]/living-green/managing-unwanted-medications         Primary Care Provider Office Phone # Fax #    Sumi Shoaib Carreon [phone] [address] [address] [facility] [address]        Equal Access to Services     RAMIREZ MACKENZIE : Hadii aad ku hadasho Sotahir, waaxda luqadaha, qaybta kaalmada adelaurayada, bigg corral . So [facility] [phone].    ATENCIÓN: Si habla español, tiene a mcfarland disposición servicios gratuitos de asistencia lingüística. Llame al [phone].    We comply with applicable federal civil rights laws and [place] laws. We do not discriminate on the basis of race, color, national origin, age, disability, sex, sexual orientation, or gender identity.            Thank you!     Thank you for choosing [facility]  for your care. Our goal is always to provide you with excellent care. Hearing back from our patients is one way we can continue to improve our services. Please take a few minutes to complete the written survey that you may receive in the mail after your visit with us. Thank you!             Your Updated Medication List - Protect others around you: Learn how to safely use, store and throw away your medicines at www.disposemymeds.org.          This list is accurate as of 8/21/18  2:27 PM.  Always use your most recent med list.                   Brand Name Dispense Instructions for use Diagnosis    aspirin 81 MG tablet      Take 81 mg by mouth daily        chlorproMAZINE 25 MG tablet    THORAZINE     TK 1 T PO Q 8 H PRN         clopidogrel 75 MG tablet    PLAVIX    30 tablet    Take 1 tablet (75 mg) by mouth daily    Vertebral artery dissection (H)       cyclobenzaprine 10 MG tablet    FLEXERIL     Take 10 mg by mouth 3 times daily as needed for muscle spasms        metFORMIN 500 MG 24 hr tablet    GLUCOPHAGE-XR     Take 2,000 mg by mouth At Bedtime        methocarbamol 500 MG tablet    ROBAXIN          naproxen 500 MG tablet    NAPROSYN    30 tablet    Take 1 tablet (500 mg) by mouth 2 times daily as needed for moderate pain    Other migraine without status migrainosus, not intractable       oxyCODONE-acetaminophen 5-325 MG per tablet    PERCOCET     Take 1 tablet by mouth every 6 hours as needed for pain        sertraline 50 MG tablet    ZOLOFT     Take 50 mg by mouth At Bedtime        traMADol 50 MG tablet    ULTRAM          tretinoin 0.05 % cream    RETIN-A     VIDA EXT AA D        triamcinolone 0.1 % cream    KENALOG     VIDA SPARINGLY EXT AA TID FOR 14 DAYS        zonisamide 100 MG capsule    ZONEGRAN

## 2018-08-21 NOTE — PROGRESS NOTES
SUBJECTIVE:   Radha Fuentes is a 43 year old female who presents to clinic today for the following health issues:    Hospital Follow-up Visit:    Hospital/Nursing Home/IP Rehab Facility: [facility]  Date of Admission: 8/11/2018  Date of Discharge: 8/12/12018  Reason(s) for Admission:  Vertebral artery dissection            Problems taking medications regularly:  None       Medication changes since discharge: Aspirin 81 mg, plavix 75 mg daily       Problems adhering to non-medication therapy:  None    Summary of hospitalization:  [facility] discharge summary reviewed  Diagnostic Tests/Treatments reviewed.  Follow up needed: repeat MRI 3 months  Other Healthcare Providers Involved in Patient s Care:         Specialist appointment - Neurology, seeing on a monthly basis  Update since discharge: stable.     Post Discharge Medication Reconciliation: discharge medications reconciled, continue medications without change.  Plan of care communicated with patient     Coding guidelines for this visit:  Type of Medical   Decision Making Face-to-Face Visit       within 7 Days of discharge Face-to-Face Visit        within 14 days of discharge   Moderate Complexity 41336 68438   High Complexity 86188 20504            Having trouble with migraine HA since she was told to stop using triptans.   Was following with Neurology prior to dissection, managing migraines with botox injections and triptans.     Tramadol and percocet only took the edge off her migraine. apap doesn't touch it. Plans to reach out to neurology to see what else she can use.     Pain at the site of her IV, right arm. Tender to touch.         Problem list and histories reviewed & adjusted, as indicated.  Additional history: none    Patient Active Problem List   Diagnosis     PCOS (polycystic ovarian syndrome)     Family history of aortic aneurysm- dad with marfan     Other migraine without status migrainosus, not intractable     H/O LEEP      Anxiety     Vertebral artery dissection (H)     Past Surgical History:   Procedure Laterality Date     ARTHROSCOPY KNEE  1991    left     Breast reconstruction with implants  1998     CHOLECYSTECTOMY  2010     TONSILLECTOMY  1996       Social History   Substance Use Topics     Smoking status: Former Smoker     Smokeless tobacco: Never Used     Alcohol use No     Family History   Problem Relation Age of Onset     Marfan Syndrome Father      HEART DISEASE Mother      mitral valve replacement at age 64     Aneurysm Mother            Reviewed and updated as needed this visit by clinical staff       Reviewed and updated as needed this visit by Provider         ROS:  Constitutional, HEENT, cardiovascular, pulmonary, gi and gu systems are negative, except as otherwise noted.    OBJECTIVE:     /86 (BP Location: Right arm, Patient Position: Chair, Cuff Size: Adult Regular)  Pulse 85  Temp 98.9  F (37.2  C) (Oral)  Wt 169 lb 4.8 oz (76.8 kg)  LMP 08/19/2018 (Approximate)  SpO2 99%  Breastfeeding? No  BMI 24.29 kg/m2  Body mass index is 24.29 kg/(m^2).  GENERAL: healthy, alert and no distress  PSYCH: normal affect, pleasant  EXTREM: firm cord palpated right antecubital area, bruising overlying      Diagnostic Test Results:  none     ASSESSMENT/PLAN:     1. Hospital discharge follow-up    2. Vertebral artery dissection (H) - following with neurology, on anti-platelet medications.     3. Other migraine without status migrainosus, not intractable - discussed temporary use for migraines and for #4 below  - naproxen (NAPROSYN) 500 MG tablet; Take 1 tablet (500 mg) by mouth 2 times daily as needed for moderate pain  Dispense: 30 tablet; Refill: 1    4. Superficial thrombophlebitis of right upper extremity - heat and NSAID x 5 days    Sumi Carreon MD  [facility]

## 2018-09-06 ENCOUNTER — TRANSFERRED RECORDS (OUTPATIENT)
Dept: HEALTH INFORMATION MANAGEMENT | Facility: CLINIC | Age: 43
End: 2018-09-06

## 2018-11-06 ENCOUNTER — OFFICE VISIT (OUTPATIENT)
Dept: URGENT CARE | Facility: URGENT CARE | Age: 43
End: 2018-11-06
Payer: COMMERCIAL

## 2018-11-06 ENCOUNTER — HOSPITAL ENCOUNTER (OUTPATIENT)
Dept: MAMMOGRAPHY | Facility: CLINIC | Age: 43
Discharge: HOME | End: 2018-11-06
Attending: OBSTETRICS & GYNECOLOGY | Admitting: OBSTETRICS & GYNECOLOGY
Payer: COMMERCIAL

## 2018-11-06 VITALS
OXYGEN SATURATION: 100 % | HEART RATE: 74 BPM | TEMPERATURE: 98.4 F | SYSTOLIC BLOOD PRESSURE: 130 MMHG | DIASTOLIC BLOOD PRESSURE: 90 MMHG

## 2018-11-06 DIAGNOSIS — Z12.31 VISIT FOR SCREENING MAMMOGRAM: ICD-10-CM

## 2018-11-06 DIAGNOSIS — Z23 NEED FOR PROPHYLACTIC VACCINATION AND INOCULATION AGAINST INFLUENZA: ICD-10-CM

## 2018-11-06 DIAGNOSIS — G43.809 OTHER MIGRAINE, NOT INTRACTABLE, WITHOUT STATUS MIGRAINOSUS: Primary | ICD-10-CM

## 2018-11-06 PROCEDURE — 77063 BREAST TOMOSYNTHESIS BI: CPT

## 2018-11-06 PROCEDURE — 90686 IIV4 VACC NO PRSV 0.5 ML IM: CPT | Performed by: PHYSICIAN ASSISTANT

## 2018-11-06 PROCEDURE — 96372 THER/PROPH/DIAG INJ SC/IM: CPT | Mod: 59 | Performed by: PHYSICIAN ASSISTANT

## 2018-11-06 PROCEDURE — 90471 IMMUNIZATION ADMIN: CPT | Performed by: PHYSICIAN ASSISTANT

## 2018-11-06 PROCEDURE — 99214 OFFICE O/P EST MOD 30 MIN: CPT | Mod: 25 | Performed by: PHYSICIAN ASSISTANT

## 2018-11-06 ASSESSMENT — ENCOUNTER SYMPTOMS
PHOTOPHOBIA: 1
DIARRHEA: 0
SORE THROAT: 0
NECK STIFFNESS: 0
VOMITING: 0
NECK PAIN: 0
HEADACHES: 1
NAUSEA: 1
BACK PAIN: 0
COUGH: 0

## 2018-11-06 NOTE — MR AVS SNAPSHOT
After Visit Summary   11/6/2018    Radha Fuentes    MRN: [id number]           Patient Information     Date Of Birth          1975        Visit Information        Provider Department      11/6/2018 5:15 PM Linda Chaudhry PA-C [facility]        Today's Diagnoses     Other migraine, not intractable, without status migrainosus    -  1    Need for prophylactic vaccination and inoculation against influenza           Follow-ups after your visit        Future tests that were ordered for you today     Open Future Orders        Priority Expected Expires Ordered    MA Screen with Implants Bilateral w/Juan A Routine  11/5/2019 11/5/2018            Who to contact     If you have questions or need follow up information about today's clinic visit or your schedule please contact [facility] directly at [phone].  Normal or non-critical lab and imaging results will be communicated to you by Ozsalehart, letter or phone within 4 business days after the clinic has received the results. If you do not hear from us within 7 days, please contact the clinic through Ozsalehart or phone. If you have a critical or abnormal lab result, we will notify you by phone as soon as possible.  Submit refill requests through [employer] or call your pharmacy and they will forward the refill request to us. Please allow 3 business days for your refill to be completed.          Additional Information About Your Visit        MyChart Information     [employer] gives you secure access to your electronic health record. If you see a primary care provider, you can also send messages to your care team and make appointments. If you have questions, please call your primary care clinic.  If you do not have a primary care provider, please call [phone] and they will assist you.        Care EveryWhere ID     This is your Care EveryWhere ID. This could be used by other organizations to access your [place]  medical records  [id number]        Your Vitals Were     Pulse Temperature Pulse Oximetry             74 98.4  F (36.9  C) (Oral) 100%          Blood Pressure from Last 3 Encounters:   11/06/18 130/90   08/21/18 131/86   08/12/18 (!) 128/93    Weight from Last 3 Encounters:   08/21/18 169 lb 4.8 oz (76.8 kg)   08/11/18 165 lb (74.8 kg)   08/09/18 162 lb (73.5 kg)              We Performed the Following     FLU VACCINE, SPLIT VIRUS, IM (QUADRIVALENT) [51510]- >3 YRS     INJECTION INTRAMUSCULAR OR SUB-Q     INJECTION INTRAMUSCULAR OR SUB-Q     KETOROLAC TROMETHAMINE 15MG     PROMETHAZINE HCL INJ/50MG        Primary Care Provider Office Phone # Fax #    Sumi Shoaib Carreon [phone] [address] [facility] [address]        Equal Access to Services     RAMIREZ MACKENZIE : Hadii saadia morgan hadasho Soomaali, waaxda luqadaha, qaybta kaalmada adeegyada, waxay arun corral . So [facility] [phone].    ATENCIÓN: Si habla español, tiene a mcfarland disposición servicios gratuitos de asistencia lingüística. Llame al [phone].    We comply with applicable federal civil rights laws and [place] laws. We do not discriminate on the basis of race, color, national origin, age, disability, sex, sexual orientation, or gender identity.            Thank you!     Thank you for choosing [facility]  for your care. Our goal is always to provide you with excellent care. Hearing back from our patients is one way we can continue to improve our services. Please take a few minutes to complete the written survey that you may receive in the mail after your visit with us. Thank you!             Your Updated Medication List - Protect others around you: Learn how to safely use, store and throw away your medicines at www.disposemymeds.org.          This list is accurate as of 11/6/18  9:52 PM.  Always use your most recent med list.                   Brand Name Dispense Instructions for use  Diagnosis    aspirin 81 MG tablet      Take 81 mg by mouth daily        chlorproMAZINE 25 MG tablet    THORAZINE     TK 1 T PO Q 8 H PRN        clopidogrel 75 MG tablet    PLAVIX    30 tablet    Take 1 tablet (75 mg) by mouth daily    Vertebral artery dissection (H)       cyclobenzaprine 10 MG tablet    FLEXERIL     Take 10 mg by mouth 3 times daily as needed for muscle spasms        metFORMIN 500 MG 24 hr tablet    GLUCOPHAGE-XR     Take 2,000 mg by mouth At Bedtime        methocarbamol 500 MG tablet    ROBAXIN          naproxen 500 MG tablet    NAPROSYN    30 tablet    Take 1 tablet (500 mg) by mouth 2 times daily as needed for moderate pain    Other migraine without status migrainosus, not intractable       oxyCODONE-acetaminophen 5-325 MG per tablet    PERCOCET     Take 1 tablet by mouth every 6 hours as needed for pain        sertraline 50 MG tablet    ZOLOFT     Take 50 mg by mouth At Bedtime        traMADol 50 MG tablet    ULTRAM          tretinoin 0.05 % cream    RETIN-A     VIDA EXT AA D        triamcinolone 0.1 % cream    KENALOG     VIDA SPARINGLY EXT AA TID FOR 14 DAYS        zonisamide 100 MG capsule    ZONEGRAN

## 2018-11-06 NOTE — PROGRESS NOTES
SUBJECTIVE:   Radha Fuentes is a 43 year old female presenting with a chief complaint of   Chief Complaint   Patient presents with     Urgent Care     Headache     Possible migraine flare started yesterday. Dx with migraine 20 yrs ago. Unable to take imetrix due to existing medical condition. Rt side pain- throbbing, nauseted, light sensinsitivity     Flu Shot       She is an established patient of [facility].    Headache    Onset of symptoms was yesterday.  Course of illness is same  Severity moderate  Character of pain:throbbing   Current and associated symptoms: nausea and light sensitivity  Location of pain: right-sided  Prodromal sx?:  No  History of Migraines: Yes. Use to manage migraines with triptans and botox until left sided vertebral artery dissection 3 months ago. Advised no triptans per neurology. Tramadol is not helping.  Is headache similar to previous: Yes  Predisposing factors: None  Treatment and measures tried: Naprosyn, fluids, rest        Review of Systems   HENT: Negative for sore throat.    Eyes: Positive for photophobia.   Respiratory: Negative for cough.    Gastrointestinal: Positive for nausea. Negative for diarrhea and vomiting.   Musculoskeletal: Negative for back pain, neck pain and neck stiffness.   Neurological: Positive for headaches.       Past Medical History:   Diagnosis Date     Gallstone      Migraine     sees neurologist-      PCOS (polycystic ovarian syndrome)     on metformin     Family History   Problem Relation Age of Onset     Marfan Syndrome Father      HEART DISEASE Mother      mitral valve replacement at age 64     Aneurysm Mother      Current Outpatient Prescriptions   Medication Sig Dispense Refill     aspirin 81 MG tablet Take 81 mg by mouth daily       clopidogrel (PLAVIX) 75 MG tablet Take 1 tablet (75 mg) by mouth daily 30 tablet 0     cyclobenzaprine (FLEXERIL) 10 MG tablet Take 10 mg by mouth 3 times daily as needed for muscle spasms       metFORMIN  (GLUCOPHAGE-XR) 500 MG 24 hr tablet Take 2,000 mg by mouth At Bedtime       methocarbamol (ROBAXIN) 500 MG tablet        naproxen (NAPROSYN) 500 MG tablet Take 1 tablet (500 mg) by mouth 2 times daily as needed for moderate pain 30 tablet 1     oxyCODONE-acetaminophen (PERCOCET) 5-325 MG per tablet Take 1 tablet by mouth every 6 hours as needed for pain       sertraline (ZOLOFT) 50 MG tablet Take 50 mg by mouth At Bedtime       traMADol (ULTRAM) 50 MG tablet   0     tretinoin (RETIN-A) 0.05 % cream VIDA EXT AA D  11     chlorproMAZINE (THORAZINE) 25 MG tablet TK 1 T PO Q 8 H PRN  2     triamcinolone (KENALOG) 0.1 % cream VIDA SPARINGLY EXT AA TID FOR 14 DAYS  0     zonisamide (ZONEGRAN) 100 MG capsule   1     Social History   Substance Use Topics     Smoking status: Former Smoker     Smokeless tobacco: Never Used     Alcohol use No       OBJECTIVE  /90 (BP Location: Right arm, Patient Position: Chair, Cuff Size: Adult Regular)  Pulse 74  Temp 98.4  F (36.9  C) (Oral)  SpO2 100%    Physical Exam   Constitutional: She is oriented to person, place, and time. She appears well-developed and well-nourished. No distress.   HENT:   Head: Normocephalic and atraumatic.   Mouth/Throat: Oropharynx is clear and moist.   Eyes: Conjunctivae and EOM are normal. Pupils are equal, round, and reactive to light.   Neck: Normal range of motion.   Cardiovascular: Regular rhythm and normal heart sounds.    Pulmonary/Chest: Effort normal and breath sounds normal. No respiratory distress.   Neurological: She is alert and oriented to person, place, and time.   Skin: Skin is warm and dry.   Psychiatric: She has a normal mood and affect.       Labs:  No results found for this or any previous visit (from the past 24 hour(s)).        ASSESSMENT:      ICD-10-CM    1. Other migraine, not intractable, without status migrainosus G43.809 KETOROLAC TROMETHAMINE 15MG     INJECTION INTRAMUSCULAR OR SUB-Q     PROMETHAZINE HCL INJ/50MG      INJECTION INTRAMUSCULAR OR SUB-Q   2. Need for prophylactic vaccination and inoculation against influenza Z23 FLU VACCINE, SPLIT VIRUS, IM (QUADRIVALENT) [47869]- >3 YRS        Medical Decision Making:    Differential Diagnosis:  Headache:  Common migraine, Classic migraine    Serious Comorbid Conditions:  Adult: vertebral artery dissection    PLAN:    Migraine HA: Toradol 30 mg IM, phenergan 25 mg IM given in urgent care tonight. Advised rest, push fluids. Follow up if any worsening symptoms. Patient agrees with the plan.  Need for flu shot: Flu shot given in urgent care tonight.    Followup:    If not improving or if condition worsens, follow up with your Primary Care Provider

## 2018-11-07 NOTE — PROGRESS NOTES

## 2018-11-13 DIAGNOSIS — F41.9 ANXIETY: Primary | ICD-10-CM

## 2018-11-13 NOTE — TELEPHONE ENCOUNTER
"Requested Prescriptions   Pending Prescriptions Disp Refills     sertraline (ZOLOFT) 50 MG tablet  DISCONTINUED 8/9/18.  Last Written Prescription Date:  3/30/18  Last Fill Quantity: 135 TABLET,  # refills: 1   Last office visit: 11/6/2018 with prescribing provider:  PATRICE   Future Office Visit:     30 tablet      Sig: Take 1 tablet (50 mg) by mouth At Bedtime    SSRIs Protocol Passed    11/13/2018 11:42 AM  PHQ-9 SCORE 5/17/2017 9/15/2017   Total Score MyChart - 3 (Minimal depression)   Total Score 8 3     JUAN-7 SCORE 5/17/2017 9/15/2017   Total Score - 4 (minimal anxiety)   Total Score 14 4              Passed - Recent (12 mo) or future (30 days) visit within the authorizing provider's specialty    Patient had office visit in the last 12 months or has a visit in the next 30 days with authorizing provider or within the authorizing provider's specialty.  See \"Patient Info\" tab in inbasket, or \"Choose Columns\" in Meds & Orders section of the refill encounter.             Passed - Patient is age 18 or older       Passed - No active pregnancy on record       Passed - No positive pregnancy test in last 12 months          "

## 2018-11-14 NOTE — TELEPHONE ENCOUNTER
Prescription approved per [facility] Refill Protocol.    See warning with refilling?   Melva Fischer, RN

## 2018-11-16 ENCOUNTER — OFFICE VISIT (OUTPATIENT)
Dept: FAMILY MEDICINE | Facility: CLINIC | Age: 43
End: 2018-11-16
Payer: COMMERCIAL

## 2018-11-16 VITALS
SYSTOLIC BLOOD PRESSURE: 122 MMHG | WEIGHT: 177 LBS | BODY MASS INDEX: 25.34 KG/M2 | HEIGHT: 70 IN | HEART RATE: 91 BPM | TEMPERATURE: 98.7 F | DIASTOLIC BLOOD PRESSURE: 74 MMHG

## 2018-11-16 DIAGNOSIS — F41.9 ANXIETY: ICD-10-CM

## 2018-11-16 DIAGNOSIS — I77.74 VERTEBRAL ARTERY DISSECTION (H): ICD-10-CM

## 2018-11-16 DIAGNOSIS — K29.00 ACUTE GASTRITIS WITHOUT HEMORRHAGE, UNSPECIFIED GASTRITIS TYPE: Primary | ICD-10-CM

## 2018-11-16 PROCEDURE — 99214 OFFICE O/P EST MOD 30 MIN: CPT | Performed by: FAMILY MEDICINE

## 2018-11-16 RX ORDER — SUCRALFATE ORAL 1 G/10ML
1 SUSPENSION ORAL 4 TIMES DAILY
Qty: 420 ML | Refills: 1 | Status: SHIPPED | OUTPATIENT
Start: 2018-11-16 | End: 2019-12-16

## 2018-11-16 RX ORDER — OMEPRAZOLE 40 MG/1
40 CAPSULE, DELAYED RELEASE ORAL DAILY
Qty: 30 CAPSULE | Refills: 0 | Status: SHIPPED | OUTPATIENT
Start: 2018-11-16 | End: 2018-11-29

## 2018-11-16 NOTE — PROGRESS NOTES
SUBJECTIVE:   Radha Fuentes is a 43 year old female who presents to clinic today for the following health issues:      Abdominal Pain      Duration: 5 days    Description (location/character/radiation): center abdomen       Associated flank pain: None    Intensity:  7/10    Accompanying signs and symptoms:        Fever/Chills: no        Gas/Bloating: YES       Nausea/vomitting: no        Diarrhea: no        Dysuria or Hematuria: no     History (previous similar pain/trauma/previous testing): no    Precipitating or alleviating factors:       Pain worse with eating/BM/urination: eating       Pain relieved by BM: no     Therapies tried and outcome: pepcid ac helped some    LMP:  11-2-2018      Has not had similar in the past, but did have an episode of RUQ abdominal pain earlier this year that has since subsided.     Reports significant stress in her life.   No food triggers.  Taking NSAID once weekly, typically without food.   Taking a handful of vitamins before bedtime on an empty stomach.     No melenotic stool.   No nausea or vomiting.     Following with Neurology for VAD as migraine HA. Taking plavix and ASA daily.     Taking sertraline for anxiety, wants to stay same dose.       Problem list and histories reviewed & adjusted, as indicated.  Additional history: none    Patient Active Problem List   Diagnosis     PCOS (polycystic ovarian syndrome)     Family history of aortic aneurysm- dad with marfan     Other migraine without status migrainosus, not intractable     H/O LEEP     Anxiety     Vertebral artery dissection (H)     Past Surgical History:   Procedure Laterality Date     ARTHROSCOPY KNEE  1991    left     Breast reconstruction with implants  1998     CHOLECYSTECTOMY  2010     TONSILLECTOMY  1996       Social History   Substance Use Topics     Smoking status: Former Smoker     Smokeless tobacco: Never Used     Alcohol use No     Family History   Problem Relation Age of Onset     Marfan Syndrome Father  "     HEART DISEASE Mother      mitral valve replacement at age 64     Aneurysm Mother            Reviewed and updated as needed this visit by clinical staff       Reviewed and updated as needed this visit by Provider         ROS:  Constitutional, HEENT, cardiovascular, pulmonary, gi and gu systems are negative, except as otherwise noted.    OBJECTIVE:     /74  Pulse 91  Temp 98.7  F (37.1  C) (Oral)  Ht 5' 10\" (1.778 m)  Wt 177 lb (80.3 kg)  LMP 11/02/2018  Breastfeeding? No  BMI 25.4 kg/m2  Body mass index is 25.4 kg/(m^2).  GENERAL: healthy, alert and no distress  RESP: lungs clear to auscultation - no rales, rhonchi or wheezes  CV: regular rate and rhythm, normal S1 S2, no S3 or S4, no murmur, click or rub, no peripheral edema and peripheral pulses strong  ABDOMEN: soft, nontender, no hepatosplenomegaly, no masses and bowel sounds normal  PSYCH: mentation appears normal, affect normal/bright    Diagnostic Test Results:  none     ASSESSMENT/PLAN:     1. Acute gastritis without hemorrhage, unspecified gastritis type - discussed likely based on symptom report, suggested 1 month PPI, weekend of carafate then stop. Call if symptoms worsen or fail to improve. At this point, low concern for gastric ulcer.   - omeprazole (PRILOSEC) 40 MG capsule; Take 1 capsule (40 mg) by mouth daily Take 30-60 minutes before a meal.  Dispense: 30 capsule; Refill: 0  - sucralfate (CARAFATE) 1 GM/10ML suspension; Take 10 mLs (1 g) by mouth 4 times daily  Dispense: 420 mL; Refill: 1    2. Anxiety - reports stress but does not desire change to anxiolytic    3. Vertebral artery dissection (H) - following with neurology, on plavix, advised bleeding risk.     Sumi Carreon MD  [facility]    "

## 2018-11-16 NOTE — MR AVS SNAPSHOT
After Visit Summary   11/16/2018    Radha Fuentes    MRN: [id number]           Patient Information     Date Of Birth          1975        Visit Information        Provider Department      11/16/2018 11:00 AM Sumi Carreon MD [facility]        Today's Diagnoses     Acute gastritis without hemorrhage, unspecified gastritis type    -  1    Anxiety        Vertebral artery dissection (H)           Follow-ups after your visit        Follow-up notes from your care team     Return in about 1 month (around 12/16/2018) for if symptoms fail to improve or worsen.      Who to contact     If you have questions or need follow up information about today's clinic visit or your schedule please contact [facility] directly at [phone].  Normal or non-critical lab and imaging results will be communicated to you by MyChart, letter or phone within 4 business days after the clinic has received the results. If you do not hear from us within 7 days, please contact the clinic through [employer] or phone. If you have a critical or abnormal lab result, we will notify you by phone as soon as possible.  Submit refill requests through [employer] or call your pharmacy and they will forward the refill request to us. Please allow 3 business days for your refill to be completed.          Additional Information About Your Visit        MyChart Information     [employer] gives you secure access to your electronic health record. If you see a primary care provider, you can also send messages to your care team and make appointments. If you have questions, please call your primary care clinic.  If you do not have a primary care provider, please call [phone] and they will assist you.        Care EveryWhere ID     This is your Care EveryWhere ID. This could be used by other organizations to access your [facility] medical records  [id number]        Your Vitals Were     Pulse Temperature Height Last  "Period Breastfeeding? BMI (Body Mass Index)    91 98.7  F (37.1  C) (Oral) 5' 10\" (1.778 m) 11/02/2018 No 25.4 kg/m2       Blood Pressure from Last 3 Encounters:   11/16/18 122/74   11/06/18 130/90   08/21/18 131/86    Weight from Last 3 Encounters:   11/16/18 177 lb (80.3 kg)   08/21/18 169 lb 4.8 oz (76.8 kg)   08/11/18 165 lb (74.8 kg)              Today, you had the following     No orders found for display         Today's Medication Changes          These changes are accurate as of 11/16/18 12:40 PM.  If you have any questions, ask your nurse or doctor.               Start taking these medicines.        Dose/Directions    omeprazole 40 MG capsule   Commonly known as:  priLOSEC   Used for:  Acute gastritis without hemorrhage, unspecified gastritis type   Started by:  Sumi Carreon MD        Dose:  40 mg   Take 1 capsule (40 mg) by mouth daily Take 30-60 minutes before a meal.   Quantity:  30 capsule   Refills:  0       sucralfate 1 GM/10ML suspension   Commonly known as:  CARAFATE   Used for:  Acute gastritis without hemorrhage, unspecified gastritis type   Started by:  Sumi Carreon MD        Dose:  1 g   Take 10 mLs (1 g) by mouth 4 times daily   Quantity:  420 mL   Refills:  1         Stop taking these medicines if you haven't already. Please contact your care team if you have questions.     chlorproMAZINE 25 MG tablet   Commonly known as:  THORAZINE   Stopped by:  Sumi Carreon MD           oxyCODONE-acetaminophen 5-325 MG per tablet   Commonly known as:  PERCOCET   Stopped by:  Sumi Carreon MD           triamcinolone 0.1 % cream   Commonly known as:  KENALOG   Stopped by:  Sumi Carreon MD           zonisamide 100 MG capsule   Commonly known as:  ZONEGRAN   Stopped by:  Sumi Carreon MD                Where to get your medicines      These medications were sent to [facility] [address] [place] [address] [id number] [place] [address] AT [address] [address] " [facility] [address]     Phone:  [phone]     omeprazole 40 MG capsule    sucralfate 1 GM/10ML suspension                Primary Care Provider Office Phone # Fax #    Sumi Shoaib Carreon [phone] [address]  [facility] [address]        Equal Access to Services     [facility] CenterJEFFREY : Hadii aad ku hadasho Soomaali, waaxda luqadaha, qaybta kaalmada adeegyada, waxay idiin hayaan adeeg kharash la'aan ah. So [facility] [phone].    ATENCIÓN: Si habla español, tiene a mcfarland disposición servicios gratuitos de asistencia lingüística. Amairani al [phone].    We comply with applicable federal civil rights laws and [place] laws. We do not discriminate on the basis of race, color, national origin, age, disability, sex, sexual orientation, or gender identity.            Thank you!     Thank you for choosing [facility]  for your care. Our goal is always to provide you with excellent care. Hearing back from our patients is one way we can continue to improve our services. Please take a few minutes to complete the written survey that you may receive in the mail after your visit with us. Thank you!             Your Updated Medication List - Protect others around you: Learn how to safely use, store and throw away your medicines at www.disposemymeds.org.          This list is accurate as of 11/16/18 12:40 PM.  Always use your most recent med list.                   Brand Name Dispense Instructions for use Diagnosis    aspirin 81 MG tablet      Take 81 mg by mouth daily        clopidogrel 75 MG tablet    PLAVIX    30 tablet    Take 1 tablet (75 mg) by mouth daily    Vertebral artery dissection (H)       cyclobenzaprine 10 MG tablet    FLEXERIL     Take 10 mg by mouth 3 times daily as needed for muscle spasms        metFORMIN 500 MG 24 hr tablet    GLUCOPHAGE-XR     Take 2,000 mg by mouth At Bedtime        methocarbamol 500 MG tablet    ROBAXIN          naproxen 500 MG tablet     NAPROSYN    30 tablet    Take 1 tablet (500 mg) by mouth 2 times daily as needed for moderate pain    Other migraine without status migrainosus, not intractable       omeprazole 40 MG capsule    priLOSEC    30 capsule    Take 1 capsule (40 mg) by mouth daily Take 30-60 minutes before a meal.    Acute gastritis without hemorrhage, unspecified gastritis type       sertraline 50 MG tablet    ZOLOFT    135 tablet    Take 1.5 tablets (75 mg) by mouth At Bedtime    Anxiety       sucralfate 1 GM/10ML suspension    CARAFATE    420 mL    Take 10 mLs (1 g) by mouth 4 times daily    Acute gastritis without hemorrhage, unspecified gastritis type       traMADol 50 MG tablet    ULTRAM          tretinoin 0.05 % cream    RETIN-A     VIDA EXT AA D

## 2018-11-29 ENCOUNTER — MYC MEDICAL ADVICE (OUTPATIENT)
Dept: FAMILY MEDICINE | Facility: CLINIC | Age: 43
End: 2018-11-29

## 2018-11-29 DIAGNOSIS — K21.9 GASTROESOPHAGEAL REFLUX DISEASE WITHOUT ESOPHAGITIS: ICD-10-CM

## 2018-11-29 RX ORDER — PANTOPRAZOLE SODIUM 20 MG/1
TABLET, DELAYED RELEASE ORAL
Qty: 90 TABLET | Refills: 1 | Status: SHIPPED | OUTPATIENT
Start: 2018-11-29 | End: 2019-12-18

## 2018-11-29 NOTE — TELEPHONE ENCOUNTER
Spoke with pharmacy as we did not get notification.  Per pharmacist there is an interaction between omeprazole and plavix where the omeprazole decreased the efficacy of the plavix.  Epic does not trigger this interaction.  An alternative is Protonix    Please advise    Tyler Perez RN, BSN

## 2018-12-31 DIAGNOSIS — G43.809 OTHER MIGRAINE WITHOUT STATUS MIGRAINOSUS, NOT INTRACTABLE: ICD-10-CM

## 2018-12-31 NOTE — TELEPHONE ENCOUNTER
"Requested Prescriptions   Pending Prescriptions Disp Refills     naproxen (NAPROSYN) 500 MG tablet 30 tablet 1    Last Written Prescription Date:  08/21/2018  Last Fill Quantity: 30 tablet,  # refills: 1   Last office visit: 11/16/2018 with prescribing provider:  11/16/2018   Future Office Visit:     Sig: Take 1 tablet (500 mg) by mouth 2 times daily as needed for moderate pain    NSAID Medications Passed - 12/31/2018  7:17 AM       Passed - Blood pressure under 140/90 in past 12 months    BP Readings from Last 3 Encounters:   11/16/18 122/74   11/06/18 130/90   08/21/18 131/86                Passed - Normal ALT on file in past 12 months    Recent Labs   Lab Test 03/01/18  1336   ALT 17            Passed - Normal AST on file in past 12 months    Recent Labs   Lab Test 03/01/18  1336   AST 19            Passed - Recent (12 mo) or future (30 days) visit within the authorizing provider's specialty    Patient had office visit in the last 12 months or has a visit in the next 30 days with authorizing provider or within the authorizing provider's specialty.  See \"Patient Info\" tab in inbasket, or \"Choose Columns\" in Meds & Orders section of the refill encounter.             Passed - Patient is age 6-64 years       Passed - Normal CBC on file in past 12 months    Recent Labs   Lab Test 08/11/18  1145   WBC 8.2   RBC 4.95   HGB 14.8   HCT 45.4                   Passed - No active pregnancy on record       Passed - Normal serum creatinine on file in past 12 months    Recent Labs   Lab Test 08/11/18  1145   CR 0.76            Passed - No positive pregnancy test in past 12 months          Davon Ames XRT  "

## 2019-01-02 NOTE — TELEPHONE ENCOUNTER
Routing refill request to provider for review/approval because:  Drug interaction warning  Tyler Perez RN, BSN

## 2019-01-03 RX ORDER — NAPROXEN 500 MG/1
500 TABLET ORAL 2 TIMES DAILY PRN
Qty: 30 TABLET | Refills: 1 | Status: SHIPPED | OUTPATIENT
Start: 2019-01-03 | End: 2019-11-05

## 2019-01-28 DIAGNOSIS — F41.9 ANXIETY: ICD-10-CM

## 2019-01-28 NOTE — TELEPHONE ENCOUNTER
"Requested Prescriptions   Pending Prescriptions Disp Refills     sertraline (ZOLOFT) 50 MG tablet  Last Written Prescription Date:  11/14/2018  Last Fill Quantity: 135 tablet,  # refills: 1   Last Office Visit: 11/16/2018   Future Office Visit:      135 tablet 1     Sig: Take 1.5 tablets (75 mg) by mouth At Bedtime    SSRIs Protocol Passed - 1/28/2019 11:40 AM       Passed - Recent (12 mo) or future (30 days) visit within the authorizing provider's specialty    Patient had office visit in the last 12 months or has a visit in the next 30 days with authorizing provider or within the authorizing provider's specialty.  See \"Patient Info\" tab in inbasket, or \"Choose Columns\" in Meds & Orders section of the refill encounter.             Passed - Medication is active on med list       Passed - Patient is age 18 or older       Passed - No active pregnancy on record       Passed - No positive pregnancy test in last 12 months          "

## 2019-01-28 NOTE — TELEPHONE ENCOUNTER
ELVIRA for call back- sent in November to local pharmacy    Does pt now want mail order?    Melva Fischer, RN

## 2019-03-29 ENCOUNTER — TRANSFERRED RECORDS (OUTPATIENT)
Dept: HEALTH INFORMATION MANAGEMENT | Facility: CLINIC | Age: 44
End: 2019-03-29

## 2019-05-04 ENCOUNTER — OFFICE VISIT (OUTPATIENT)
Dept: URGENT CARE | Facility: URGENT CARE | Age: 44
End: 2019-05-04
Payer: COMMERCIAL

## 2019-05-04 VITALS
SYSTOLIC BLOOD PRESSURE: 125 MMHG | WEIGHT: 177 LBS | HEIGHT: 70 IN | HEART RATE: 78 BPM | BODY MASS INDEX: 25.34 KG/M2 | DIASTOLIC BLOOD PRESSURE: 86 MMHG | TEMPERATURE: 97.9 F | RESPIRATION RATE: 14 BRPM

## 2019-05-04 DIAGNOSIS — R07.0 THROAT PAIN: ICD-10-CM

## 2019-05-04 DIAGNOSIS — J01.90 ACUTE SINUSITIS WITH SYMPTOMS > 10 DAYS: Primary | ICD-10-CM

## 2019-05-04 LAB
DEPRECATED S PYO AG THROAT QL EIA: NORMAL
SPECIMEN SOURCE: NORMAL

## 2019-05-04 PROCEDURE — 87880 STREP A ASSAY W/OPTIC: CPT | Performed by: FAMILY MEDICINE

## 2019-05-04 PROCEDURE — 87081 CULTURE SCREEN ONLY: CPT | Performed by: PHYSICIAN ASSISTANT

## 2019-05-04 PROCEDURE — 99213 OFFICE O/P EST LOW 20 MIN: CPT | Performed by: PHYSICIAN ASSISTANT

## 2019-05-04 RX ORDER — AMOXICILLIN 500 MG/1
1000 CAPSULE ORAL 2 TIMES DAILY
Qty: 40 CAPSULE | Refills: 0 | Status: SHIPPED | OUTPATIENT
Start: 2019-05-04 | End: 2019-05-14

## 2019-05-04 ASSESSMENT — ENCOUNTER SYMPTOMS
DIARRHEA: 0
SORE THROAT: 1
CHILLS: 0
SINUS PAIN: 1
FEVER: 0
SINUS PRESSURE: 1
COUGH: 0
VOMITING: 0

## 2019-05-04 ASSESSMENT — MIFFLIN-ST. JEOR: SCORE: 1543.12

## 2019-05-04 NOTE — PROGRESS NOTES
SUBJECTIVE:   Radha Fuentes is a 43 year old female presenting with a chief complaint of   Chief Complaint   Patient presents with     Pharyngitis     sick over 2 weeks. sinus pain, earache sore throat       She is an established patient of [facility].    URI Adult    Onset of symptoms was 2-3 week(s) ago.  Course of illness is worsening.    Severity moderate  Current and Associated symptoms: stuffy nose, ear pain bilateral, sore throat, facial pain/pressure and headache  Treatment measures tried include Antihistamine and Mucinex.  Predisposing factors include None.  Denies f/c/n/v/d.      Review of Systems   Constitutional: Negative for chills and fever.   HENT: Positive for congestion, ear pain, postnasal drip, sinus pressure, sinus pain and sore throat.    Respiratory: Negative for cough.    Gastrointestinal: Negative for diarrhea and vomiting.       Past Medical History:   Diagnosis Date     Gallstone      Migraine     sees neurologist-      PCOS (polycystic ovarian syndrome)     on metformin     Family History   Problem Relation Age of Onset     Marfan Syndrome Father      Heart Disease Mother         mitral valve replacement at age 64     Aneurysm Mother      Current Outpatient Medications   Medication Sig Dispense Refill     amoxicillin (AMOXIL) 500 MG capsule Take 2 capsules (1,000 mg) by mouth 2 times daily for 10 days 40 capsule 0     aspirin 81 MG tablet Take 81 mg by mouth daily       methocarbamol (ROBAXIN) 500 MG tablet        naproxen (NAPROSYN) 500 MG tablet Take 1 tablet (500 mg) by mouth 2 times daily as needed for moderate pain 30 tablet 1     sertraline (ZOLOFT) 50 MG tablet Take 1.5 tablets (75 mg) by mouth At Bedtime 135 tablet 0     tretinoin (RETIN-A) 0.05 % cream VIDA EXT AA D  11     clopidogrel (PLAVIX) 75 MG tablet Take 1 tablet (75 mg) by mouth daily (Patient not taking: Reported on 5/4/2019) 30 tablet 0     cyclobenzaprine (FLEXERIL) 10 MG tablet Take 10 mg by mouth 3 times daily as  "needed for muscle spasms       metFORMIN (GLUCOPHAGE-XR) 500 MG 24 hr tablet Take 2,000 mg by mouth At Bedtime       pantoprazole (PROTONIX) 20 MG EC tablet Take by mouth 30-60 minutes before a meal. (Patient not taking: Reported on 5/4/2019) 90 tablet 1     sucralfate (CARAFATE) 1 GM/10ML suspension Take 10 mLs (1 g) by mouth 4 times daily (Patient not taking: Reported on 5/4/2019) 420 mL 1     traMADol (ULTRAM) 50 MG tablet   0     Social History     Tobacco Use     Smoking status: Former Smoker     Smokeless tobacco: Never Used   Substance Use Topics     Alcohol use: No     Alcohol/week: 0.5 - 1.0 oz     Types: 1 - 2 Standard drinks or equivalent per week       OBJECTIVE  /86 (BP Location: Right arm, Patient Position: Sitting, Cuff Size: Adult Regular)   Pulse 78   Temp 97.9  F (36.6  C) (Oral)   Resp 14   Ht 1.778 m (5' 10\")   Wt 80.3 kg (177 lb)   Breastfeeding? No   BMI 25.40 kg/m      Physical Exam   Constitutional: She appears well-developed and well-nourished. No distress.   HENT:   Head: Normocephalic and atraumatic.   Right Ear: Tympanic membrane normal.   Left Ear: Tympanic membrane normal.   Mouth/Throat: Oropharynx is clear and moist.   Nasal passages with boggy turbinates. Maxillary sinuses are tender to percussion.    Eyes: Conjunctivae are normal.   Neck: Normal range of motion.   Cardiovascular: Regular rhythm and normal heart sounds.   Pulmonary/Chest: Effort normal and breath sounds normal. No respiratory distress. She has no wheezes. She has no rales.   Neurological: She is alert.   Skin: Skin is warm and dry.   Psychiatric: She has a normal mood and affect.       Labs:  Results for orders placed or performed in visit on 05/04/19 (from the past 24 hour(s))   Rapid strep screen   Result Value Ref Range    Specimen Description Throat     Rapid Strep A Screen       NEGATIVE: No Group A streptococcal antigen detected by immunoassay, await culture report.         ASSESSMENT:      " ICD-10-CM    1. Acute sinusitis with symptoms > 10 days J01.90 amoxicillin (AMOXIL) 500 MG capsule   2. Throat pain R07.0 Rapid strep screen     Beta strep group A culture          PLAN:    Acute sinusitis: Symptoms suggest. Amoxicillin Rx. Tylenol or motrin prn headache . Follow up if any worsening symptoms. Patient agrees.   Acute pharyngitis: Most likely from post nasal drainage. Rapid strep is negative today.  Throat culture is pending.  Supportive care measures advised.  We will communicate any positive finding on the throat culture result.  Follow-up if any worsening symptoms.  Patient understands and agrees with the plan.      Followup:    If not improving or if condition worsens, follow up with your Primary Care Provider

## 2019-05-05 LAB
BACTERIA SPEC CULT: NORMAL
SPECIMEN SOURCE: NORMAL

## 2019-06-21 ENCOUNTER — TRANSFERRED RECORDS (OUTPATIENT)
Dept: HEALTH INFORMATION MANAGEMENT | Facility: CLINIC | Age: 44
End: 2019-06-21

## 2019-08-06 NOTE — ED AVS SNAPSHOT
Emergency Department    [address] [place] [address]    Phone:  [phone]    Fax:  [phone]                                       Radha Fuentes   MRN: [id number]    Department:   Emergency Department   Date of Visit:  8/9/2018           After Visit Summary Signature Page     I have received my discharge instructions, and my questions have been answered. I have discussed any challenges I see with this plan with the nurse or doctor.    ..........................................................................................................................................  Patient/Patient Representative Signature      ..........................................................................................................................................  Patient Representative Print Name and Relationship to Patient    ..................................................               ................................................  Date                                            Time    ..........................................................................................................................................  Reviewed by Signature/Title    ...................................................              ..............................................  Date                                                            Time           RT Protocol Note  Jimmie Chung 80 y o  female MRN: [id number]  Unit/Bed#: ICU 02 Encounter: [id number]    Assessment    Principal Problem:    OR Exploratory laparotomy, SBR secondary to iatrogenic enterotomy   Active Problems:    Incisional hernia    Severe sepsis with septic shock (CODE) ([facility])    Acute respiratory failure with hypoxemia (HCC)    Acute renal failure (ARF) ([facility])    Postprocedural pneumothorax    Shock ([facility] )    OR Laparoscopic ventral hernia repair      Home Pulmonary Medications:  none       Past Medical History:   Diagnosis Date    Abnormal blood chemistry     last assessed 03/06/2014    Achalasia, esophageal     Acute medial meniscus tear     last assessed 03/10/2014    Acute otitis externa     last assessed 03/24/2014    Adrenal nodule ([facility] [address] )     Anemia     Anxiety     Arthritis     Atrial fibrillation ([facility])     resolved 01/09/2018    Atypical chest pain     last assessed 11/22/2016    Eagle's esophagus with high grade dysplasia     last assessed 01/03/2018    Cancer ([facility] )     esophageal    Cerumen impaction     last assessed 03/24/2014    Chronic pain of right knee     last assessed 04/06/2017    Coronary artery disease     3 vessel, resolved 01/09/2018    Depression     Dysphagia     last assessed 06/21/2017    Esophageal stricture     last assessed 06/21/2017    GERD (gastroesophageal reflux disease)     Headache     last assessed 08/01/2013    Hiatal hernia     Insomnia     last assessed 10/15/2013    Jejunostomy tube present ([facility] )     resolved 01/09/2018    Low vitamin D level     resolved 01/08/2018    Osteoarthritis, knee     last assessed 04/06/2017    Pneumonia     last assessed 05/06/2013    Right leg paresthesias     last assessed 02/23/2017    Sciatica     last assessed 05/08/2015    Shingles     last assessed 05/12/2015    Spondylosis of lumbar region without myelopathy or radiculopathy     last assessed 04/06/2017    Venous insufficiency last assessed 12/06/2013     Social History     Socioeconomic History    Marital status: /Civil Union     Spouse name: Not on file    Number of children: Not on file    Years of education: Not on file    Highest education level: Not on file   Occupational History    Not on file   Social Needs    Financial resource strain: Not on file    Food insecurity:     Worry: Not on file     Inability: Not on file    Transportation needs:     Medical: Not on file     Non-medical: Not on file   Tobacco Use    Smoking status: Never Smoker    Smokeless tobacco: Never Used   Substance and Sexual Activity    Alcohol use: Not Currently     Alcohol/week: 0 0 standard drinks     Frequency: Never     Binge frequency: Never    Drug use: No    Sexual activity: Not Currently     Partners: Male   Lifestyle    Physical activity:     Days per week: Not on file     Minutes per session: Not on file    Stress: Not on file   Relationships    Social connections:     Talks on phone: Not on file     Gets together: Not on file     Attends Jewish service: Not on file     Active member of club or organization: Not on file     Attends meetings of clubs or organizations: Not on file     Relationship status: Not on file    Intimate partner violence:     Fear of current or ex partner: Not on file     Emotionally abused: Not on file     Physically abused: Not on file     Forced sexual activity: Not on file   Other Topics Concern    Not on file   Social History Narrative    Patient has living will       Subjective         Objective    Physical Exam:   Assessment Type: (P) Pre-treatment  General Appearance: (P) Awake, Alert  Respiratory Pattern: (P) Tachypneic  Chest Assessment: (P) Chest expansion symmetrical  Bilateral Breath Sounds: (P) Diminished  Cough: (P) Non-productive  O2 Device: (P) nc    Vitals:  Blood pressure 110/58, pulse 94, temperature 100 4 °F (38 °C), resp   rate (!) 24, height 5' 4" (1 626 m), weight 78 3 kg (172 lb 9 9 oz), SpO2 96 %  Results from last 7 days   Lab Units 08/05/19  0546   PH ART  7 487*   PCO2 ART mm Hg 27 4*   PO2 ART mm Hg 102 4   HCO3 ART mmol/L 20 3*   BASE EXC ART mmol/L -2 2   O2 CONTENT ART mL/dL 14 6*   O2 HGB, ARTERIAL % 97 6*   ABG SOURCE  Radial, Right   MAX TEST  Yes       Imaging and other studies: I have personally reviewed pertinent reports  O2 Device: (P) nc     Plan    Respiratory Plan: (P) Discontinue Protocol  Airway Clearance Plan: (P) Incentive Spirometer     Resp Comments: (P) Pt  instructed on IS at this time  Pt  tachypneic and had a hard time following instruction   Will work with pt on IS use

## 2019-09-13 ENCOUNTER — TRANSFERRED RECORDS (OUTPATIENT)
Dept: HEALTH INFORMATION MANAGEMENT | Facility: CLINIC | Age: 44
End: 2019-09-13

## 2019-10-04 ENCOUNTER — OFFICE VISIT (OUTPATIENT)
Dept: PODIATRY | Facility: CLINIC | Age: 44
End: 2019-10-04
Payer: COMMERCIAL

## 2019-10-04 VITALS
DIASTOLIC BLOOD PRESSURE: 68 MMHG | HEIGHT: 70 IN | BODY MASS INDEX: 25.34 KG/M2 | SYSTOLIC BLOOD PRESSURE: 118 MMHG | WEIGHT: 177 LBS

## 2019-10-04 DIAGNOSIS — L60.0 INGROWN NAIL OF GREAT TOE OF RIGHT FOOT: ICD-10-CM

## 2019-10-04 DIAGNOSIS — M79.672 FOOT PAIN, BILATERAL: Primary | ICD-10-CM

## 2019-10-04 DIAGNOSIS — L60.0 INGROWN NAIL OF GREAT TOE OF LEFT FOOT: ICD-10-CM

## 2019-10-04 DIAGNOSIS — M79.671 FOOT PAIN, BILATERAL: Primary | ICD-10-CM

## 2019-10-04 PROCEDURE — 11750 EXCISION NAIL&NAIL MATRIX: CPT | Mod: TA | Performed by: PODIATRIST

## 2019-10-04 PROCEDURE — 99203 OFFICE O/P NEW LOW 30 MIN: CPT | Mod: 25 | Performed by: PODIATRIST

## 2019-10-04 RX ORDER — SILVER SULFADIAZINE 10 MG/G
CREAM TOPICAL 2 TIMES DAILY
Qty: 25 G | Refills: 1 | Status: SHIPPED | OUTPATIENT
Start: 2019-10-04 | End: 2020-03-03

## 2019-10-04 ASSESSMENT — MIFFLIN-ST. JEOR: SCORE: 1538.12

## 2019-10-04 NOTE — LETTER
10/4/2019         RE: Radha Fuentes  [address] [facility] [address]        Dear Colleague,    Thank you for referring your patient, Radha Fuentes, to the [facility]. Please see a copy of my visit note below.    PATIENT HISTORY:   Radha Fuentes is a 44 year old female who presents to clinic for pain to both great toenails. Notes it has been going on for months. She has  Had them before but the pain went away. Sore in shoes or with pressure. Denies injury. Pain is 5/10. Wondering what can be done for them.     Review of Systems:  Patient denies fever, chills, rash, wound, stiffness, limping, numbness, weakness, heart burn, blood in stool, chest pain with activity, calf pain when walking, shortness of breath with activity, chronic cough, easy bleeding/bruising, swelling of ankles, excessive thirst, fatigue, depression, anxiety.       PAST MEDICAL HISTORY:   Past Medical History:   Diagnosis Date     Gallstone      Migraine     sees neurologist-      PCOS (polycystic ovarian syndrome)     on metformin        PAST SURGICAL HISTORY:   Past Surgical History:   Procedure Laterality Date     ARTHROSCOPY KNEE  1991    left     Breast reconstruction with implants  1998     CHOLECYSTECTOMY  2010     TONSILLECTOMY  1996        MEDICATIONS:   Current Outpatient Medications:      aspirin 81 MG tablet, Take 81 mg by mouth daily, Disp: , Rfl:      cyclobenzaprine (FLEXERIL) 10 MG tablet, Take 10 mg by mouth 3 times daily as needed for muscle spasms, Disp: , Rfl:      metFORMIN (GLUCOPHAGE-XR) 500 MG 24 hr tablet, Take 2,000 mg by mouth At Bedtime, Disp: , Rfl:      methocarbamol (ROBAXIN) 500 MG tablet, , Disp: , Rfl:      naproxen (NAPROSYN) 500 MG tablet, Take 1 tablet (500 mg) by mouth 2 times daily as needed for moderate pain, Disp: 30 tablet, Rfl: 1     sertraline (ZOLOFT) 50 MG tablet, Take 1.5 tablets (75 mg) by mouth At Bedtime, Disp: 135 tablet, Rfl: 0     traMADol (ULTRAM) 50 MG tablet,  , Disp: , Rfl: 0     tretinoin (RETIN-A) 0.05 % cream, VIDA EXT AA D, Disp: , Rfl: 11     clopidogrel (PLAVIX) 75 MG tablet, Take 1 tablet (75 mg) by mouth daily (Patient not taking: Reported on 5/4/2019), Disp: 30 tablet, Rfl: 0     pantoprazole (PROTONIX) 20 MG EC tablet, Take by mouth 30-60 minutes before a meal. (Patient not taking: Reported on 5/4/2019), Disp: 90 tablet, Rfl: 1     sucralfate (CARAFATE) 1 GM/10ML suspension, Take 10 mLs (1 g) by mouth 4 times daily (Patient not taking: Reported on 5/4/2019), Disp: 420 mL, Rfl: 1     ALLERGIES:  No Known Allergies     SOCIAL HISTORY:   Social History     Socioeconomic History     Marital status:      Spouse name: Not on file     Number of children: Not on file     Years of education: Not on file     Highest education level: Not on file   Occupational History     Not on file   Social Needs     Financial resource strain: Not on file     Food insecurity:     Worry: Not on file     Inability: Not on file     Transportation needs:     Medical: Not on file     Non-medical: Not on file   Tobacco Use     Smoking status: Former Smoker     Smokeless tobacco: Never Used   Substance and Sexual Activity     Alcohol use: No     Alcohol/week: 0.8 - 1.7 standard drinks     Types: 1 - 2 Standard drinks or equivalent per week     Drug use: No     Sexual activity: Yes     Partners: Male   Lifestyle     Physical activity:     Days per week: Not on file     Minutes per session: Not on file     Stress: Not on file   Relationships     Social connections:     Talks on phone: Not on file     Gets together: Not on file     Attends Jew service: Not on file     Active member of club or organization: Not on file     Attends meetings of clubs or organizations: Not on file     Relationship status: Not on file     Intimate partner violence:     Fear of current or ex partner: Not on file     Emotionally abused: Not on file     Physically abused: Not on file     Forced sexual  "activity: Not on file   Other Topics Concern     Parent/sibling w/ CABG, MI or angioplasty before 65F 55M? Not Asked   Social History Narrative     Not on file        FAMILY HISTORY:   Family History   Problem Relation Age of Onset     Marfan Syndrome Father      Heart Disease Mother         mitral valve replacement at age 64     Aneurysm Mother         EXAM:Vitals: /68   Ht 1.778 m (5' 10\")   Wt 80.3 kg (177 lb)   BMI 25.40 kg/m     BMI= Body mass index is 25.4 kg/m .    General appearance: Patient is alert and fully cooperative with history & exam.  No sign of distress is noted during the visit.     Psychiatric: Affect is pleasant & appropriate.  Patient appears motivated to improve health.     Respiratory: Breathing is regular & unlabored while sitting.     HEENT: Hearing is intact to spoken word.  Speech is clear.  No gross evidence of visual impairment that would impact ambulation.     Dermatologic: medial borders of both great toes are incurvated. Pain on palpation.      Vascular: DP & PT pulses are intact & regular bilaterally.  No significant edema or varicosities noted.  CFT and skin temperature is normal to both lower extremities.     Neurologic: Lower extremity sensation is intact to light touch.  No evidence of weakness or contracture in the lower extremities.  No evidence of neuropathy.     Musculoskeletal: Patient is ambulatory without assistive device or brace.  No gross ankle deformity noted.  No foot or ankle joint effusion is noted.     ASSESSMENT:    Foot pain, bilateral  Ingrown nail of great toe of left foot  Ingrown nail of great toe of right foot     PLAN:  Reviewed patient's chart in [facility]. The potential causes and nature of an ingrown toenail were discussed with the patient.  We reviewed the natural history/prognosis of the condition and potential risks if no treatment is provided.      Treatment options discussed included conservative management (oral antibiotics, soaking of foot, " adequate width shoes)  as well as surgical management (partial or total nail removal).  The pros and cons of both forms of treatment were reviewed.      After thorough discussion and answering all questions, the patient elected to have the borders removed permanently. Will soak the foot 2x a day for 6 weeks and apply silvadene cream and bandage. .      Procedure: After verbal consent, the right big toe was anesthetized with 5cc's of 1% lidocaine plain. A tourniquet was applied to the toe. The medial border was then raised from the nail bed and then cut the length of the nail.  The offending nail border was then removed.  Three 30 second applications of phenol were applied to the nail bed and nail matrix.  The area was then flushed with copious amounts of alcohol.  Bacitracin was applied to the nail bed.  The tourniquet was removed.  Bandage was applied to the toe.  The patient tolerated the procedure and anesthesia well.    Procedure 2: same procedure to left great toenail.     Maxine Win DPM, Podiatry/Foot and Ankle Surgery    Weight management plan: Patient was referred to their PCP to discuss a diet and exercise plan.      Again, thank you for allowing me to participate in the care of your patient.        Sincerely,        Maxine Win DPM, Podiatry/Foot and Ankle Surgery

## 2019-10-04 NOTE — PATIENT INSTRUCTIONS
Thank you for choosing [facility]!    DR. [facility]S CLIN[facility] SCHEDULE  MONDAY AM - [place] TUESDAY - [place]   [address] [address] [place] [address] [place] [address]   [phone] / [phone] / [phone]       WEDNESDAY - [place] FRIDAY AM - [facility]   [address]   [place] [address] [place] [address]   [phone] / [phone]       FRIDAY PM - [place] SCHEDULE SURGERY: [phone]   [address] BILLING QUESTIONS: [phone]   [place] [address] AFTER HOURS: [phone] / [phone] APPOINTMENTS: [phone]     [employer] (CPL) [phone]       INGROWN TOENAILS  When a toenail is ingrown, it is curved and grows into the skin, usually at the nail borders (the sides of the nail). This  digging in  of the nail irritates the skin, often creating pain, redness, swelling, and warmth in the toe.  If an ingrown nail causes a break in the skin, bacteria may enter and cause an infection in the area, which is often marked by drainage and a foul odor. However, even if the toe isn t painful, red, swollen, or warm, a nail that curves downward into the skin can progress to an infection.  CAUSES:  Heredity: In many people, the tendency for ingrown toenails is inherited.   Trauma: Sometimes an ingrown toenail is the result of trauma, such as stubbing your toe, having an object fall on your toe, or engaging in activities that involve repeated pressure on the toes, such as kicking or running.   Improper Trimming:  The most common cause of ingrown toenails is cutting your nails too short. This encourages the skin next to the nail to fold over the nail.   Improperly Sized Footwear: Ingrown toenails can result from wearing socks and shoes that are tight or short.   Nail Conditions: Ingrown toenails can be caused by nail problems, such as fungal  infections or losing a nail due to trauma.   TREATMENT: Sometimes initial treatment for ingrown toenails can be safely performed at home. However, home treatment is strongly discouraged if an infection is suspected, or for those who have medical conditions that put feet at high risk, such as diabetes, nerve damage in the foot, or poor circulation.  Home care: If you don t have an infection or any of the above medical conditions, you can soak your foot in room-temperature water (adding Epsom s salt may be recommended by your doctor), and gently massage the side of the nail fold to help reduce the inflammation.  Avoid attempting  bathroom surgery.  Repeated cutting of the nail can cause the condition to worsen over time. If your symptoms fail to improve, it s time to see a foot and ankle surgeon.  Physician care: After examining the toe, the foot and ankle surgeon will select the treatment best suited for you. If an infection is present, an oral antibiotic may be prescribed.  Sometimes a minor surgical procedure, often performed in the office, will ease the pain and remove the offending nail. After applying a local anesthetic, the doctor removes part of the nail s side border. Some nails may become ingrown again, requiring removal of the nail root.  Following the nail procedure, a light bandage will be applied. Most people experience very little pain after surgery and may resume normal activity the next day. If your surgeon has prescribed an oral antibiotic, be sure to take all the medication, even if your symptoms have improved.  PREVENTION:  Proper Trimming: Cut toenails in a fairly straight line, and don t cut them too short. You should be able to get your fingernail under the sides and end of the nail.   Well-fitting Footwear: Don t wear shoes that are short or tight in the toe area. Avoid shoes that are loose, because they too cause pressure on the toes, especially when running or walking briskly.     INGROWN  TOENAIL REMOVAL AFTERCARE     Go directly home and elevate the affected foot on one or two pillows for the remainder of the day/evening if possible. Your toe may stay numb anywhere from 2-8 hours.     Take Tylenol, ibuprofen or another anti-inflammatory as needed for pain.     Take antibiotic if that has been prescribed. Finish the entire prescribed antibiotic even if your symptoms have improved.     The evening of the procedure, soak/wash the affected area in warm water (you may add Epsom salt) for 5 to 10 minutes. Do this twice a day for 2-4 weeks (6-8 weeks if you had phenol) (you may count showering/bathing as one soak).  After soaks, pat the area dry and then allow to airdry for a few minutes. Apply antibiotic ointment to the area and cover with 2 X 2 gauze and paper tape or band-aid.    You may pursue everyday activities as tolerated with either an open toe shoe or cut-out shoe as needed or you may wear regular shoes if no pain is noted.    Watch for any signs and symptoms of infection such as: redness, red streaks going up the foot/leg, swelling, pus or foul odor. Those that have had the phenol procedure, the toe will drain longer and will look like it is infected because it is a chemical burn.     Please call with questions.        BODY WEIGHT AND YOUR FEET  The following information is included in the after visit summary for all patients. Body weight can be a sensitive issue to discuss in clinic, but we think the following information is very important. Although we focus on the feet and ankles, we do support the overall health of our patients.     Many things can cause foot and ankle problems. Foot structure, activity level, foot mechanics and injuries are common causes of pain. One very important issue that often goes unmentioned, is body weight. Extra weight can cause increased stress on muscles, ligaments, bones and tendons. Sometimes just a few extra pounds is all it takes to put one over her/his  threshold. Without reducing that stress, it can be difficult to alleviate pain. As Foot & Ankle specialists, our job is addressing the lower extremity problem and possible causes. Regarding extra body weight, we encourage patients to discuss diet and weight management plans with their primary care doctors. It is this team approach that gives you the best opportunity for pain relief and getting you back on your feet.      [facility] has a Comprehensive Weight Management Program. This program includes counseling, education, non-surgical and surgical approaches to weight loss. If you are interested in learning more either talk to you primary care provider or call [phone].

## 2019-10-04 NOTE — PROGRESS NOTES
PATIENT HISTORY:   Radha Fuentes is a 44 year old female who presents to clinic for pain to both great toenails. Notes it has been going on for months. She has  Had them before but the pain went away. Sore in shoes or with pressure. Denies injury. Pain is 5/10. Wondering what can be done for them.     Review of Systems:  Patient denies fever, chills, rash, wound, stiffness, limping, numbness, weakness, heart burn, blood in stool, chest pain with activity, calf pain when walking, shortness of breath with activity, chronic cough, easy bleeding/bruising, swelling of ankles, excessive thirst, fatigue, depression, anxiety.       PAST MEDICAL HISTORY:   Past Medical History:   Diagnosis Date     Gallstone      Migraine     sees neurologist-      PCOS (polycystic ovarian syndrome)     on metformin        PAST SURGICAL HISTORY:   Past Surgical History:   Procedure Laterality Date     ARTHROSCOPY KNEE  1991    left     Breast reconstruction with implants  1998     CHOLECYSTECTOMY  2010     TONSILLECTOMY  1996        MEDICATIONS:   Current Outpatient Medications:      aspirin 81 MG tablet, Take 81 mg by mouth daily, Disp: , Rfl:      cyclobenzaprine (FLEXERIL) 10 MG tablet, Take 10 mg by mouth 3 times daily as needed for muscle spasms, Disp: , Rfl:      metFORMIN (GLUCOPHAGE-XR) 500 MG 24 hr tablet, Take 2,000 mg by mouth At Bedtime, Disp: , Rfl:      methocarbamol (ROBAXIN) 500 MG tablet, , Disp: , Rfl:      naproxen (NAPROSYN) 500 MG tablet, Take 1 tablet (500 mg) by mouth 2 times daily as needed for moderate pain, Disp: 30 tablet, Rfl: 1     sertraline (ZOLOFT) 50 MG tablet, Take 1.5 tablets (75 mg) by mouth At Bedtime, Disp: 135 tablet, Rfl: 0     traMADol (ULTRAM) 50 MG tablet, , Disp: , Rfl: 0     tretinoin (RETIN-A) 0.05 % cream, VIDA EXT AA D, Disp: , Rfl: 11     clopidogrel (PLAVIX) 75 MG tablet, Take 1 tablet (75 mg) by mouth daily (Patient not taking: Reported on 5/4/2019), Disp: 30 tablet, Rfl: 0      pantoprazole (PROTONIX) 20 MG EC tablet, Take by mouth 30-60 minutes before a meal. (Patient not taking: Reported on 5/4/2019), Disp: 90 tablet, Rfl: 1     sucralfate (CARAFATE) 1 GM/10ML suspension, Take 10 mLs (1 g) by mouth 4 times daily (Patient not taking: Reported on 5/4/2019), Disp: 420 mL, Rfl: 1     ALLERGIES:  No Known Allergies     SOCIAL HISTORY:   Social History     Socioeconomic History     Marital status:      Spouse name: Not on file     Number of children: Not on file     Years of education: Not on file     Highest education level: Not on file   Occupational History     Not on file   Social Needs     Financial resource strain: Not on file     Food insecurity:     Worry: Not on file     Inability: Not on file     Transportation needs:     Medical: Not on file     Non-medical: Not on file   Tobacco Use     Smoking status: Former Smoker     Smokeless tobacco: Never Used   Substance and Sexual Activity     Alcohol use: No     Alcohol/week: 0.8 - 1.7 standard drinks     Types: 1 - 2 Standard drinks or equivalent per week     Drug use: No     Sexual activity: Yes     Partners: Male   Lifestyle     Physical activity:     Days per week: Not on file     Minutes per session: Not on file     Stress: Not on file   Relationships     Social connections:     Talks on phone: Not on file     Gets together: Not on file     Attends Advent service: Not on file     Active member of club or organization: Not on file     Attends meetings of clubs or organizations: Not on file     Relationship status: Not on file     Intimate partner violence:     Fear of current or ex partner: Not on file     Emotionally abused: Not on file     Physically abused: Not on file     Forced sexual activity: Not on file   Other Topics Concern     Parent/sibling w/ CABG, MI or angioplasty before 65F 55M? Not Asked   Social History Narrative     Not on file        FAMILY HISTORY:   Family History   Problem Relation Age of Onset      "Marfan Syndrome Father      Heart Disease Mother         mitral valve replacement at age 64     Aneurysm Mother         EXAM:Vitals: /68   Ht 1.778 m (5' 10\")   Wt 80.3 kg (177 lb)   BMI 25.40 kg/m    BMI= Body mass index is 25.4 kg/m .    General appearance: Patient is alert and fully cooperative with history & exam.  No sign of distress is noted during the visit.     Psychiatric: Affect is pleasant & appropriate.  Patient appears motivated to improve health.     Respiratory: Breathing is regular & unlabored while sitting.     HEENT: Hearing is intact to spoken word.  Speech is clear.  No gross evidence of visual impairment that would impact ambulation.     Dermatologic: medial borders of both great toes are incurvated. Pain on palpation.      Vascular: DP & PT pulses are intact & regular bilaterally.  No significant edema or varicosities noted.  CFT and skin temperature is normal to both lower extremities.     Neurologic: Lower extremity sensation is intact to light touch.  No evidence of weakness or contracture in the lower extremities.  No evidence of neuropathy.     Musculoskeletal: Patient is ambulatory without assistive device or brace.  No gross ankle deformity noted.  No foot or ankle joint effusion is noted.     ASSESSMENT:    Foot pain, bilateral  Ingrown nail of great toe of left foot  Ingrown nail of great toe of right foot     PLAN:  Reviewed patient's chart in [facility]. The potential causes and nature of an ingrown toenail were discussed with the patient.  We reviewed the natural history/prognosis of the condition and potential risks if no treatment is provided.      Treatment options discussed included conservative management (oral antibiotics, soaking of foot, adequate width shoes)  as well as surgical management (partial or total nail removal).  The pros and cons of both forms of treatment were reviewed.      After thorough discussion and answering all questions, the patient elected to have " the borders removed permanently. Will soak the foot 2x a day for 6 weeks and apply silvadene cream and bandage. .      Procedure: After verbal consent, the right big toe was anesthetized with 5cc's of 1% lidocaine plain. A tourniquet was applied to the toe. The medial border was then raised from the nail bed and then cut the length of the nail.  The offending nail border was then removed.  Three 30 second applications of phenol were applied to the nail bed and nail matrix.  The area was then flushed with copious amounts of alcohol.  Bacitracin was applied to the nail bed.  The tourniquet was removed.  Bandage was applied to the toe.  The patient tolerated the procedure and anesthesia well.    Procedure 2: same procedure to left great toenail.     Maxine Win DPM, Podiatry/Foot and Ankle Surgery    Weight management plan: Patient was referred to their PCP to discuss a diet and exercise plan.

## 2019-10-18 DIAGNOSIS — F41.9 ANXIETY: ICD-10-CM

## 2019-10-18 NOTE — TELEPHONE ENCOUNTER
This was last fill in Jan for 90 day - appears break in therapy and pt is due for OV    LM for call back.       Melva Fischer RN

## 2019-10-18 NOTE — TELEPHONE ENCOUNTER
"Requested Prescriptions   Pending Prescriptions Disp Refills     sertraline (ZOLOFT) 50 MG tablet 135 tablet 0     Sig: Take 1.5 tablets (75 mg) by mouth At Bedtime  Last Written Prescription Date:  01/29/2019  Last Fill Quantity: 135 tablet,  # refills: 0   Last Office Visit: 11/16/2018 Velma  Future Office Visit:            SSRIs Protocol Passed - 10/18/2019  9:52 AM        Passed - Recent (12 mo) or future (30 days) visit within the authorizing provider's specialty     Patient has had an office visit with the authorizing provider or a provider within the authorizing providers department within the previous 12 mos or has a future within next 30 days. See \"Patient Info\" tab in inbasket, or \"Choose Columns\" in Meds & Orders section of the refill encounter.              Passed - Medication is active on med list        Passed - Patient is age 18 or older        Passed - No active pregnancy on record        Passed - No positive pregnancy test in last 12 months        "

## 2019-10-20 DIAGNOSIS — G43.809 OTHER MIGRAINE WITHOUT STATUS MIGRAINOSUS, NOT INTRACTABLE: Primary | ICD-10-CM

## 2019-10-20 NOTE — TELEPHONE ENCOUNTER
"Requested Prescriptions   Pending Prescriptions Disp Refills     SUMAtriptan (IMITREX) 50 MG tablet  Last Written Prescription Date:  06/08/2018  Last Fill Quantity: 18,  # refills: 3   Last office visit: 11/16/2018 with prescribing provider:  11/16/2018   Future Office Visit:           Sig: Take 2 tablets (100 mg) by mouth at onset of headache for migraine May repeat in 2 hours. Max 4 tablets/24 hours.       Serotonin Agonists Failed - 10/20/2019  1:28 PM        Failed - Serotonin Agonist request needs review.     Please review patient's record. If patient has had 8 or more treatments in the past month, please forward to provider.          Failed - Medication is active on med list        Passed - Blood pressure under 140/90 in past 12 months     BP Readings from Last 3 Encounters:   10/04/19 118/68   05/04/19 125/86   11/16/18 122/74                 Passed - Recent (12 mo) or future (30 days) visit within the authorizing provider's specialty     Patient has had an office visit with the authorizing provider or a provider within the authorizing providers department within the previous 12 mos or has a future within next 30 days. See \"Patient Info\" tab in inbasket, or \"Choose Columns\" in Meds & Orders section of the refill encounter.              Passed - Patient is age 18 or older        Passed - No active pregnancy on record        Passed - No positive pregnancy test in past 12 months          "

## 2019-10-21 RX ORDER — SUMATRIPTAN 50 MG/1
100 TABLET, FILM COATED ORAL
Qty: 18 TABLET | Refills: 0 | Status: SHIPPED | OUTPATIENT
Start: 2019-10-21 | End: 2020-03-03

## 2019-10-21 NOTE — TELEPHONE ENCOUNTER
Per patient:       I do take that medication but apparently [facility] has it under two different scripts from Dr Carreon. Regardless, you can disregard as I have plenty. There was an error with an auto refill setting. I ve let them know. Thanks!

## 2019-10-21 NOTE — TELEPHONE ENCOUNTER
Prescription approved per [facility] Refill Protocol.  Tyler Perez RN, BSN

## 2019-11-02 DIAGNOSIS — G43.809 OTHER MIGRAINE WITHOUT STATUS MIGRAINOSUS, NOT INTRACTABLE: ICD-10-CM

## 2019-11-02 NOTE — TELEPHONE ENCOUNTER
"Requested Prescriptions   Pending Prescriptions Disp Refills     naproxen (NAPROSYN) 500 MG tablet 30 tablet 1     Sig: Take 1 tablet (500 mg) by mouth 2 times daily as needed for moderate pain  Last Written Prescription Date:  01/03/2019  Last Fill Quantity: 30,  # refills: 1   Last office visit: 11/16/2018 with prescribing provider:  11/16/2018   Future Office Visit:           NSAID Medications Failed - 11/2/2019 11:48 AM        Failed - Normal ALT on file in past 12 months     Recent Labs   Lab Test 03/01/18  1336   ALT 17             Failed - Normal AST on file in past 12 months     Recent Labs   Lab Test 03/01/18  1336   AST 19             Failed - Normal CBC on file in past 12 months     Recent Labs   Lab Test 08/11/18  1145   WBC 8.2   RBC 4.95   HGB 14.8   HCT 45.4                    Failed - Normal serum creatinine on file in past 12 months     Recent Labs   Lab Test 08/11/18  1145   CR 0.76             Passed - Blood pressure under 140/90 in past 12 months     BP Readings from Last 3 Encounters:   10/04/19 118/68   05/04/19 125/86   11/16/18 122/74                 Passed - Recent (12 mo) or future (30 days) visit within the authorizing provider's specialty     Patient has had an office visit with the authorizing provider or a provider within the authorizing providers department within the previous 12 mos or has a future within next 30 days. See \"Patient Info\" tab in inbasket, or \"Choose Columns\" in Meds & Orders section of the refill encounter.              Passed - Patient is age 6-64 years        Passed - Medication is active on med list        Passed - No active pregnancy on record        Passed - No positive pregnancy test in past 12 months          "

## 2019-11-04 NOTE — TELEPHONE ENCOUNTER
Routing refill request to provider for review/approval because:  Labs not current:    Tyler Perez RN, BSN

## 2019-11-05 RX ORDER — NAPROXEN 500 MG/1
500 TABLET ORAL 2 TIMES DAILY PRN
Qty: 30 TABLET | Refills: 1 | Status: SHIPPED | OUTPATIENT
Start: 2019-11-05 | End: 2019-12-26

## 2019-11-06 ENCOUNTER — HEALTH MAINTENANCE LETTER (OUTPATIENT)
Age: 44
End: 2019-11-06

## 2019-11-18 ENCOUNTER — MYC MEDICAL ADVICE (OUTPATIENT)
Dept: FAMILY MEDICINE | Facility: CLINIC | Age: 44
End: 2019-11-18

## 2019-11-19 NOTE — TELEPHONE ENCOUNTER
Mammography should be fine for her. They have a protocol for breast implants.     Ultrasound is not sensitive enough.     MRI will not be covered by her insurance.     CARMEAL

## 2019-11-29 DIAGNOSIS — Z20.828 EXPOSURE TO INFLUENZA: Primary | ICD-10-CM

## 2019-11-29 RX ORDER — OSELTAMIVIR PHOSPHATE 75 MG/1
75 CAPSULE ORAL DAILY
Qty: 10 CAPSULE | Refills: 0 | Status: SHIPPED | OUTPATIENT
Start: 2019-11-29 | End: 2020-03-03

## 2019-12-02 ENCOUNTER — OFFICE VISIT (OUTPATIENT)
Dept: PODIATRY | Facility: CLINIC | Age: 44
End: 2019-12-02
Payer: COMMERCIAL

## 2019-12-02 VITALS
HEIGHT: 70 IN | DIASTOLIC BLOOD PRESSURE: 80 MMHG | BODY MASS INDEX: 25.34 KG/M2 | WEIGHT: 177 LBS | SYSTOLIC BLOOD PRESSURE: 126 MMHG

## 2019-12-02 DIAGNOSIS — M20.41 HAMMERTOE OF RIGHT FOOT: Primary | ICD-10-CM

## 2019-12-02 PROCEDURE — 99213 OFFICE O/P EST LOW 20 MIN: CPT | Performed by: PODIATRIST

## 2019-12-02 ASSESSMENT — MIFFLIN-ST. JEOR: SCORE: 1538.12

## 2019-12-02 NOTE — LETTER
"    12/2/2019         RE: Radha Fuentes  [address] [facility] [address]        Dear Colleague,    Thank you for referring your patient, Radha Fuentes, to the [facility]. Please see a copy of my visit note below.    Podiatry / Foot and Ankle Surgery Progress Note    December 2, 2019    Subject: Patient was seen for follow up on ingrown nail removals. Procedure done 2 months ago. She just wanted to make sure every thing was healing well. No pain. No drainage. Would also like right 2nd toe looked at. Notes a little more pain over last few days rubbing in her shoes. Has had it for a long time but wondering if it is getting worse. Pain is 3/10 at its worst. Denies injury.    Objective:  Vitals: Ht 1.778 m (5' 10\")   Wt 80.3 kg (177 lb)   BMI 25.40 kg/m     BMI= Body mass index is 25.4 kg/m .    General:  Patient is alert and orientated.  NAD  Vascular:  DP and PT pulses are palpable.  No varicosities noted  CFT's < 3secs.  Skin temp is normal  Neuro:  Light and gross touch sensation intact to digits, dorsum, and plantar aspects of the feet.  Derm:  Healed partial nail avulsions great toes.   Musculoskeletal:  Semi flexible contracture right 2nd toe.     Assessment: Hammertoe of right foot      Plan: Reviewed and discussed causes of hammertoes with patient.  Explained that this can be caused by an overpowering of muscles or by the way we walk.  Discussed conservative treatments such as orthotics, pads, shoe gear.  Explained that sometimes the flexor tendons can be cut to try and straighten the toe and reduce rubbing. This is normally done in office and patient is weight bearing in postop she for 1-2 weeks.  We also discussed surgical intervention to remove the joint and possibly fuse the toe.  Normally patient has a pin sticking out of the toe for about 6 weeks and can not get the foot wet. Patient would have to be minimal weight bearing in cam boot.      Recommend toe splint at this time. " She is not interested in surgery.     Maxine Win DPM, Podiatry/Foot and Ankle Surgery    Weight management plan: Patient was referred to their PCP to discuss a diet and exercise plan.    Recommended to Radha Fuentes to follow up with Primary Care provider regarding elevated blood pressure.      Again, thank you for allowing me to participate in the care of your patient.        Sincerely,        Maxine Win DPM, Podiatry/Foot and Ankle Surgery

## 2019-12-02 NOTE — PROGRESS NOTES
"Podiatry / Foot and Ankle Surgery Progress Note    December 2, 2019    Subject: Patient was seen for follow up on ingrown nail removals. Procedure done 2 months ago. She just wanted to make sure every thing was healing well. No pain. No drainage. Would also like right 2nd toe looked at. Notes a little more pain over last few days rubbing in her shoes. Has had it for a long time but wondering if it is getting worse. Pain is 3/10 at its worst. Denies injury.    Objective:  Vitals: Ht 1.778 m (5' 10\")   Wt 80.3 kg (177 lb)   BMI 25.40 kg/m    BMI= Body mass index is 25.4 kg/m .    General:  Patient is alert and orientated.  NAD  Vascular:  DP and PT pulses are palpable.  No varicosities noted  CFT's < 3secs.  Skin temp is normal  Neuro:  Light and gross touch sensation intact to digits, dorsum, and plantar aspects of the feet.  Derm:  Healed partial nail avulsions great toes.   Musculoskeletal:  Semi flexible contracture right 2nd toe.     Assessment: Hammertoe of right foot      Plan: Reviewed and discussed causes of hammertoes with patient.  Explained that this can be caused by an overpowering of muscles or by the way we walk.  Discussed conservative treatments such as orthotics, pads, shoe gear.  Explained that sometimes the flexor tendons can be cut to try and straighten the toe and reduce rubbing. This is normally done in office and patient is weight bearing in postop she for 1-2 weeks.  We also discussed surgical intervention to remove the joint and possibly fuse the toe.  Normally patient has a pin sticking out of the toe for about 6 weeks and can not get the foot wet. Patient would have to be minimal weight bearing in cam boot.      Recommend toe splint at this time. She is not interested in surgery.     Maxine Win DPM, Podiatry/Foot and Ankle Surgery    Weight management plan: Patient was referred to their PCP to discuss a diet and exercise plan.    Recommended to Radha Fuentes to follow up with " Primary Care provider regarding elevated blood pressure.

## 2019-12-02 NOTE — PATIENT INSTRUCTIONS
Apply cuticle cream to nails daily.     Thank you for choosing [facility]!    DR[facility] SCHEDULE  MONDAY AM - [place] TUESDAY - [place]   [address] [address] [place] [address] [place] [address]   [phone] / [phone] / [phone]       WEDNESDAY - [place] FRIDAY AM - [facility]   [address]   [place] [address] [place] [address]   [phone] / [phone]       FRIDAY PM - [facility] SCHEDULE SURGERY: [phone]   [address] BILLING QUESTIONS: [phone]   [place] [address] AFTER HOURS: [phone] / [phone] APPOINTMENTS: [phone]     [employer] (CPL) [phone]       Foot and Ankle Hammertoe Post Operative Instructions   Activities:  First day of surgery you need to rest.  Stay off your feet as much as possible and keep your foot elevated above the level of your heart (about 2 pillow height).  Elevate foot 23 of 24 hours a day.  Wear your surgical shoe at all times when up.  Limit walking to 5 to 10 minutes on your heel per hour over the next few days if your doctor has previously told you that you can put some weight on the foot after surgery.  If you are suppose to be non weight bearing, which means NO WEIGHT AT ALL ON THE FOOT.  Use an ice pack on the ankle 20-30 minutes per hour to help decrease pain and swelling.  The first two weeks you need to ice and elevate as much as possible.  This will help with post op pain.  Your foot requires significant rest and elevation. Expect muscle aches, back pain, cramps, etc. Optimal posture, lumbar support, back exercises, ice and heat may all help with your new aches and pains. Do not apply a heating pad to your foot or leg as this can cause increase swelling and pain. Rather use ice in those areas.   Showering is a major challenge. Your incision requires about three  days to become sealed from water. Your bandage should not get wet and should not be removed. Do not attempt showering for the first three days. A sponge bath is preferred. You may attempt to shower on the fourth day after the operation. Your foot should be covered with a bag, tape and rubber bands. Double bagging is preferred. Standing in the shower with a bag on your foot is quite hazardous. A portable shower stool would be ideal. The bandage will need to be changed in the office if it becomes moistened. A moist bandage will not dry on its own. A moist dressing may lead to infection.      External pins need continued protection. These pins are strong but do not resist the forces of bumping. Pay attention to the position and direction of the pins. Any change in pin alignment or positioning should prompt a call. A loose pin will need to be removed. Never push a pin back into your foot.    .   Do not wear regular shoes with a surgical bandage and/or external pins in your foot.  Wear loose fitting clothing that easily will slip over the bandage and/or pins.  Also, remember that dogs are not aware of your surgery.  Please keep them away from the bandages and pins.   If your surgeon places external pins in your foot, you must keep the foot dry until the pins are removed at 6-8 weeks.  Pins should be covered with a dressing for protection.  Check for any spreading redness or yellow drainage from the pins.  Do not apply ointment around the pins.  Do not push a loose pin back into the foot.  Please call the clinic if the pin is spinning or moving in and out.  If the pins are bumped or loosened, they may need to be removed early.  This may affect your surgical outcome.    Best wishes on your recovery from surgery.  Please do not hesitate to call or  My Chart  with any questions or concerns.    HAMMERTOE PADS / TOE SPLINTS    [email or web address]   Or [email or web address]    BODY WEIGHT AND YOUR FEET  The following information is  included in the after visit summary for all patients. Body weight can be a sensitive issue to discuss in clinic, but we think the following information is very important. Although we focus on the feet and ankles, we do support the overall health of our patients.     Many things can cause foot and ankle problems. Foot structure, activity level, foot mechanics and injuries are common causes of pain. One very important issue that often goes unmentioned, is body weight. Extra weight can cause increased stress on muscles, ligaments, bones and tendons. Sometimes just a few extra pounds is all it takes to put one over her/his threshold. Without reducing that stress, it can be difficult to alleviate pain. As Foot & Ankle specialists, our job is addressing the lower extremity problem and possible causes. Regarding extra body weight, we encourage patients to discuss diet and weight management plans with their primary care doctors. It is this team approach that gives you the best opportunity for pain relief and getting you back on your feet.      [facility] has a Comprehensive Weight Management Program. This program includes counseling, education, non-surgical and surgical approaches to weight loss. If you are interested in learning more either talk to you primary care provider or call [phone].

## 2019-12-06 ENCOUNTER — TRANSFERRED RECORDS (OUTPATIENT)
Dept: HEALTH INFORMATION MANAGEMENT | Facility: CLINIC | Age: 44
End: 2019-12-06

## 2019-12-12 ENCOUNTER — OFFICE VISIT (OUTPATIENT)
Dept: URGENT CARE | Facility: URGENT CARE | Age: 44
End: 2019-12-12
Payer: COMMERCIAL

## 2019-12-12 VITALS
RESPIRATION RATE: 16 BRPM | HEART RATE: 100 BPM | SYSTOLIC BLOOD PRESSURE: 126 MMHG | TEMPERATURE: 101 F | DIASTOLIC BLOOD PRESSURE: 78 MMHG | OXYGEN SATURATION: 100 % | WEIGHT: 177 LBS | BODY MASS INDEX: 25.4 KG/M2

## 2019-12-12 DIAGNOSIS — R07.0 THROAT PAIN: ICD-10-CM

## 2019-12-12 DIAGNOSIS — J10.1 INFLUENZA B: Primary | ICD-10-CM

## 2019-12-12 LAB
DEPRECATED S PYO AG THROAT QL EIA: NORMAL
FLUAV+FLUBV AG SPEC QL: NEGATIVE
FLUAV+FLUBV AG SPEC QL: POSITIVE
SPECIMEN SOURCE: ABNORMAL
SPECIMEN SOURCE: NORMAL

## 2019-12-12 PROCEDURE — 87880 STREP A ASSAY W/OPTIC: CPT | Performed by: PHYSICIAN ASSISTANT

## 2019-12-12 PROCEDURE — 99213 OFFICE O/P EST LOW 20 MIN: CPT | Performed by: PHYSICIAN ASSISTANT

## 2019-12-12 PROCEDURE — 87804 INFLUENZA ASSAY W/OPTIC: CPT | Performed by: PHYSICIAN ASSISTANT

## 2019-12-12 PROCEDURE — 87081 CULTURE SCREEN ONLY: CPT | Performed by: PHYSICIAN ASSISTANT

## 2019-12-12 ASSESSMENT — ENCOUNTER SYMPTOMS
CHILLS: 1
SHORTNESS OF BREATH: 0
FEVER: 1
HEADACHES: 1
NAUSEA: 0
VOMITING: 0
MYALGIAS: 1
DIARRHEA: 0
COUGH: 1
SORE THROAT: 1

## 2019-12-12 NOTE — PROGRESS NOTES
SUBJECTIVE:   Radha Fuentes is a 44 year old female presenting with a chief complaint of   Chief Complaint   Patient presents with     Urgent Care     URI     Started yesterday, cough, congestion, bodyaches, wants strep and flu test done, fevers, flu exposure        She is an established patient of [facility].    URI Adult    Onset of symptoms was 1 day(s) ago.  Course of illness is worsening.    Severity moderately severe  Current and Associated symptoms: fever, chills, cough - non-productive, headache and body aches  Treatment measures tried include Tylenol/Ibuprofen.  Did oncare visit and is currently on Tamiflu.  Predisposing factors include exposure to strep and exposure to influenza.        Review of Systems   Constitutional: Positive for chills and fever.   HENT: Positive for sore throat.    Respiratory: Positive for cough. Negative for shortness of breath.    Gastrointestinal: Negative for diarrhea, nausea and vomiting.   Musculoskeletal: Positive for myalgias.   Neurological: Positive for headaches.       Past Medical History:   Diagnosis Date     Gallstone      Migraine     sees neurologist-      PCOS (polycystic ovarian syndrome)     on metformin     Family History   Problem Relation Age of Onset     Marfan Syndrome Father      Heart Disease Mother         mitral valve replacement at age 64     Aneurysm Mother      Current Outpatient Medications   Medication Sig Dispense Refill     aspirin 81 MG tablet Take 81 mg by mouth daily       clopidogrel (PLAVIX) 75 MG tablet Take 1 tablet (75 mg) by mouth daily (Patient not taking: Reported on 5/4/2019) 30 tablet 0     cyclobenzaprine (FLEXERIL) 10 MG tablet Take 10 mg by mouth 3 times daily as needed for muscle spasms       metFORMIN (GLUCOPHAGE-XR) 500 MG 24 hr tablet Take 2,000 mg by mouth At Bedtime       methocarbamol (ROBAXIN) 500 MG tablet        naproxen (NAPROSYN) 500 MG tablet Take 1 tablet (500 mg) by mouth 2 times daily as needed for moderate pain 30  tablet 1     pantoprazole (PROTONIX) 20 MG EC tablet Take by mouth 30-60 minutes before a meal. (Patient not taking: Reported on 5/4/2019) 90 tablet 1     sertraline (ZOLOFT) 50 MG tablet Take 1.5 tablets (75 mg) by mouth At Bedtime 135 tablet 0     silver sulfADIAZINE (SILVADENE) 1 % external cream Apply topically 2 times daily Apply to both great toes 2x a day. 25 g 1     sucralfate (CARAFATE) 1 GM/10ML suspension Take 10 mLs (1 g) by mouth 4 times daily (Patient not taking: Reported on 5/4/2019) 420 mL 1     SUMAtriptan (IMITREX) 50 MG tablet Take 2 tablets (100 mg) by mouth at onset of headache for migraine May repeat in 2 hours. Max 4 tablets/24 hours. 18 tablet 0     traMADol (ULTRAM) 50 MG tablet   0     tretinoin (RETIN-A) 0.05 % cream VIDA EXT AA D  11     Social History     Tobacco Use     Smoking status: Former Smoker     Smokeless tobacco: Never Used   Substance Use Topics     Alcohol use: No     Alcohol/week: 0.8 - 1.7 standard drinks     Types: 1 - 2 Standard drinks or equivalent per week       OBJECTIVE  /78   Pulse 100   Temp 101  F (38.3  C) (Oral)   Resp 16   Wt 80.3 kg (177 lb)   SpO2 100%   BMI 25.40 kg/m      Physical Exam  Constitutional:       General: She is not in acute distress.     Appearance: She is well-developed.   HENT:      Head: Normocephalic and atraumatic.      Right Ear: Tympanic membrane and external ear normal.      Left Ear: Tympanic membrane and external ear normal.      Mouth/Throat:      Mouth: Mucous membranes are moist.   Eyes:      Conjunctiva/sclera: Conjunctivae normal.   Neck:      Musculoskeletal: Normal range of motion.   Cardiovascular:      Rate and Rhythm: Regular rhythm.      Heart sounds: Normal heart sounds.   Pulmonary:      Effort: Pulmonary effort is normal. No respiratory distress.      Breath sounds: Normal breath sounds. No wheezing, rhonchi or rales.   Skin:     General: Skin is warm and dry.   Neurological:      Mental Status: She is alert.          Labs:  Results for orders placed or performed in visit on 12/12/19 (from the past 24 hour(s))   Strep, Rapid Screen   Result Value Ref Range    Specimen Description Throat     Rapid Strep A Screen       NEGATIVE: No Group A streptococcal antigen detected by immunoassay, await culture report.   Influenza A/B antigen   Result Value Ref Range    Influenza A/B Agn Specimen Nasal     Influenza A Negative NEG^Negative    Influenza B Positive (A) NEG^Negative           ASSESSMENT:      ICD-10-CM    1. Influenza B J10.1    2. Throat pain R07.0 Strep, Rapid Screen     Influenza A/B antigen     Beta strep group A culture            PLAN:    Influenza B: She is currently on Tamiflu.  Advised to finish the course of the Tamiflu.  Tylenol Motrin as needed for fever.  Push fluids.  Rest.  Follow-up if any worsening symptoms.  Patient agrees with the plan.  Acute pharyngitis: Rapid strep is negative today.  Throat culture is pending.  Supportive care measures advised.  We will communicate any positive finding on the throat culture result.  Follow-up if any worsening symptoms.  Patient understands and agrees with the plan.      Followup:    If not improving or if condition worsens, follow up with your Primary Care Provider

## 2019-12-13 LAB
BACTERIA SPEC CULT: NORMAL
SPECIMEN SOURCE: NORMAL

## 2019-12-16 DIAGNOSIS — K29.00 ACUTE GASTRITIS WITHOUT HEMORRHAGE, UNSPECIFIED GASTRITIS TYPE: ICD-10-CM

## 2019-12-16 DIAGNOSIS — K21.9 GASTROESOPHAGEAL REFLUX DISEASE WITHOUT ESOPHAGITIS: ICD-10-CM

## 2019-12-16 NOTE — TELEPHONE ENCOUNTER
LM for call back -  Break in therapy and pt needs OV    Last filled for 6 months 11/29/18 which was CASH Fischer RN

## 2019-12-18 RX ORDER — SUCRALFATE ORAL 1 G/10ML
1 SUSPENSION ORAL 4 TIMES DAILY
Qty: 420 ML | Refills: 0 | Status: SHIPPED | OUTPATIENT
Start: 2019-12-18 | End: 2020-03-03

## 2019-12-18 RX ORDER — PANTOPRAZOLE SODIUM 20 MG/1
TABLET, DELAYED RELEASE ORAL
Qty: 30 TABLET | Refills: 0 | Status: SHIPPED | OUTPATIENT
Start: 2019-12-18 | End: 2020-03-03

## 2019-12-18 NOTE — TELEPHONE ENCOUNTER
Routing refill request to provider for review/approval because:  Pt has not been seen in over 1 year-  She has had break in therapy as well    Last filled for 6 months 11/29/18 which was [facility]     Melva Fischer RN

## 2019-12-19 ENCOUNTER — OFFICE VISIT (OUTPATIENT)
Dept: URGENT CARE | Facility: URGENT CARE | Age: 44
End: 2019-12-19
Payer: COMMERCIAL

## 2019-12-19 VITALS
BODY MASS INDEX: 25.4 KG/M2 | TEMPERATURE: 98.6 F | HEART RATE: 80 BPM | OXYGEN SATURATION: 99 % | RESPIRATION RATE: 18 BRPM | WEIGHT: 177 LBS | SYSTOLIC BLOOD PRESSURE: 124 MMHG | DIASTOLIC BLOOD PRESSURE: 78 MMHG

## 2019-12-19 DIAGNOSIS — B96.89 BACTERIAL CONJUNCTIVITIS OF BOTH EYES: ICD-10-CM

## 2019-12-19 DIAGNOSIS — H92.02 LEFT EAR PAIN: ICD-10-CM

## 2019-12-19 DIAGNOSIS — J01.40 ACUTE NON-RECURRENT PANSINUSITIS: Primary | ICD-10-CM

## 2019-12-19 DIAGNOSIS — H10.9 BACTERIAL CONJUNCTIVITIS OF BOTH EYES: ICD-10-CM

## 2019-12-19 DIAGNOSIS — R05.9 COUGH: ICD-10-CM

## 2019-12-19 PROCEDURE — 99214 OFFICE O/P EST MOD 30 MIN: CPT | Performed by: NURSE PRACTITIONER

## 2019-12-19 RX ORDER — FLUTICASONE PROPIONATE 50 MCG
2 SPRAY, SUSPENSION (ML) NASAL DAILY
Qty: 18.2 ML | Refills: 0 | Status: SHIPPED | OUTPATIENT
Start: 2019-12-19 | End: 2020-03-03

## 2019-12-19 RX ORDER — POLYMYXIN B SULFATE AND TRIMETHOPRIM 1; 10000 MG/ML; [USP'U]/ML
1-2 SOLUTION OPHTHALMIC 4 TIMES DAILY
Qty: 1 BOTTLE | Refills: 0 | Status: SHIPPED | OUTPATIENT
Start: 2019-12-19 | End: 2020-03-03

## 2019-12-19 RX ORDER — BENZONATATE 200 MG/1
200 CAPSULE ORAL 3 TIMES DAILY PRN
Qty: 60 CAPSULE | Refills: 0 | Status: SHIPPED | OUTPATIENT
Start: 2019-12-19 | End: 2020-03-03

## 2019-12-19 ASSESSMENT — ENCOUNTER SYMPTOMS
COUGH: 1
HEADACHES: 1
SINUS PRESSURE: 1
RHINORRHEA: 1
DIARRHEA: 0
SINUS PAIN: 1
ABDOMINAL PAIN: 0
NAUSEA: 1
VOMITING: 0
APPETITE CHANGE: 1
FEVER: 0
SORE THROAT: 1
MYALGIAS: 0
FATIGUE: 1

## 2019-12-19 NOTE — PATIENT INSTRUCTIONS
Augmentin twice a day for 10 days  polytrim eye drops four times a day for 7 days  Tessalon three times a day as needed for cough  flonase nasal spray daily  Push Fluids  Plenty of rest  Tylenol and ibuprofen to help with pain or fever  Humidified air can help with congestion  Salt water gargles, anesthetic throat spray, or lozenges to help with sore throat.

## 2019-12-19 NOTE — PROGRESS NOTES
SUBJECTIVE:   Radha Fuentes is a 44 year old female presenting with a chief complaint of   Chief Complaint   Patient presents with     Urgent Care     Seen last week for the flu      Eye Problem     Having redness and discharge      Otalgia     Left ear having pain still      Sinus Problem       She is an established patient of [facility].    URI Adult    Onset of symptoms was 10 day(s) ago.  Course of illness is worsening.    Severity moderate  Current and Associated symptoms: runny nose, stuffy nose, cough - non-productive, ear pain left, sore throat, headache, fatigue and nausea  Treatment measures tried include Tylenol/Ibuprofen, Decongestants, Antihistamine, OTC Cough med, Vaporizer, Fluids and Rest.  Predisposing factors include exposure to influenza and recent illness .  Patient was seen on 12/12/19 for flu like symptoms. Strep was negative, however, was positive for influenza B. Was already being treated with tamiflu for exposure to influenza.     Also, she has additional complaints of eye redness and discharge that started last night. Patient denies pain, does does have some itching. States the discharge does make the vision slightly blurred. Does wear contacts. Denies foreign body sensation. Has not taken anything for symptoms. No exposure to pink eye.     Review of Systems   Constitutional: Positive for appetite change and fatigue. Negative for fever.   HENT: Positive for congestion, ear pain (left), rhinorrhea, sinus pressure, sinus pain and sore throat.    Respiratory: Positive for cough.    Gastrointestinal: Positive for nausea. Negative for abdominal pain, diarrhea and vomiting.   Musculoskeletal: Negative for myalgias.   Skin: Negative for rash.   Neurological: Positive for headaches.       Past Medical History:   Diagnosis Date     Gallstone      Migraine     sees neurologist-      PCOS (polycystic ovarian syndrome)     on metformin     Family History   Problem Relation Age of Onset     [place]  Syndrome Father      Heart Disease Mother         mitral valve replacement at age 64     Aneurysm Mother      Current Outpatient Medications   Medication Sig Dispense Refill     amoxicillin-clavulanate (AUGMENTIN) 875-125 MG tablet Take 1 tablet by mouth 2 times daily for 10 days 20 tablet 0     benzonatate (TESSALON) 200 MG capsule Take 1 capsule (200 mg) by mouth 3 times daily as needed for cough 60 capsule 0     fluticasone (FLONASE) 50 MCG/ACT nasal spray Spray 2 sprays into both nostrils daily 18.2 mL 0     trimethoprim-polymyxin b (POLYTRIM) 65210-4.1 UNIT/ML-% ophthalmic solution Place 1-2 drops into both eyes 4 times daily 1 Bottle 0     aspirin 81 MG tablet Take 81 mg by mouth daily       clopidogrel (PLAVIX) 75 MG tablet Take 1 tablet (75 mg) by mouth daily (Patient not taking: Reported on 5/4/2019) 30 tablet 0     cyclobenzaprine (FLEXERIL) 10 MG tablet Take 10 mg by mouth 3 times daily as needed for muscle spasms       metFORMIN (GLUCOPHAGE-XR) 500 MG 24 hr tablet Take 2,000 mg by mouth At Bedtime       methocarbamol (ROBAXIN) 500 MG tablet        naproxen (NAPROSYN) 500 MG tablet Take 1 tablet (500 mg) by mouth 2 times daily as needed for moderate pain 30 tablet 1     pantoprazole (PROTONIX) 20 MG EC tablet Take by mouth 30-60 minutes before a meal. 30 tablet 0     sertraline (ZOLOFT) 50 MG tablet Take 1.5 tablets (75 mg) by mouth At Bedtime 135 tablet 0     silver sulfADIAZINE (SILVADENE) 1 % external cream Apply topically 2 times daily Apply to both great toes 2x a day. 25 g 1     sucralfate (CARAFATE) 1 GM/10ML suspension Take 10 mLs (1 g) by mouth 4 times daily 420 mL 0     SUMAtriptan (IMITREX) 50 MG tablet Take 2 tablets (100 mg) by mouth at onset of headache for migraine May repeat in 2 hours. Max 4 tablets/24 hours. 18 tablet 0     traMADol (ULTRAM) 50 MG tablet   0     tretinoin (RETIN-A) 0.05 % cream VIDA EXT AA D  11     Social History     Tobacco Use     Smoking status: Former Smoker      Smokeless tobacco: Never Used   Substance Use Topics     Alcohol use: No     Alcohol/week: 0.8 - 1.7 standard drinks     Types: 1 - 2 Standard drinks or equivalent per week       OBJECTIVE  /78   Pulse 80   Temp 98.6  F (37  C) (Oral)   Resp 18   Wt 80.3 kg (177 lb)   SpO2 99%   BMI 25.40 kg/m      Physical Exam  Vitals signs and nursing note reviewed.   Constitutional:       Appearance: Normal appearance. She is well-developed. She is ill-appearing.   HENT:      Head: Normocephalic and atraumatic.      Right Ear: Ear canal and external ear normal. A middle ear effusion is present.      Left Ear: Ear canal and external ear normal. A middle ear effusion is present.      Nose: Congestion and rhinorrhea present.      Right Sinus: Maxillary sinus tenderness and frontal sinus tenderness present.      Left Sinus: Maxillary sinus tenderness and frontal sinus tenderness present.      Mouth/Throat:      Pharynx: Posterior oropharyngeal erythema present. No oropharyngeal exudate.   Eyes:      General:         Right eye: Discharge present.         Left eye: Discharge present.     Extraocular Movements: Extraocular movements intact.      Conjunctiva/sclera:      Right eye: Right conjunctiva is injected.      Left eye: Left conjunctiva is injected.      Pupils: Pupils are equal, round, and reactive to light.   Neck:      Musculoskeletal: Normal range of motion and neck supple.   Cardiovascular:      Rate and Rhythm: Normal rate and regular rhythm.      Heart sounds: Normal heart sounds.   Pulmonary:      Effort: Pulmonary effort is normal.      Breath sounds: Normal breath sounds and air entry.   Lymphadenopathy:      Head:      Right side of head: Submandibular and tonsillar adenopathy present.      Left side of head: Submandibular and tonsillar adenopathy present.      Cervical: Cervical adenopathy present.   Skin:     General: Skin is warm and dry.   Neurological:      Mental Status: She is alert and oriented to  person, place, and time.   Psychiatric:         Behavior: Behavior is cooperative.             ASSESSMENT:      ICD-10-CM    1. Acute non-recurrent pansinusitis J01.40 fluticasone (FLONASE) 50 MCG/ACT nasal spray     amoxicillin-clavulanate (AUGMENTIN) 875-125 MG tablet   2. Cough R05 benzonatate (TESSALON) 200 MG capsule   3. Bacterial conjunctivitis of both eyes H10.9 trimethoprim-polymyxin b (POLYTRIM) 42845-4.1 UNIT/ML-% ophthalmic solution   4. Left ear pain H92.02         Medical Decision Making:    Differential Diagnosis:  URI Adult/Peds:  Acute left otitis media, Bronchitis-viral, Influenza, Laryngitis, Pneumonia, Sinusitis, Strep pharyngitis, Viral pharyngitis and Viral upper respiratory illness  Eye Problem: Bacterial conjunctivitis  Viral conjunctivitis  Allergic conjunctivitis  Blepharitis    Serious Comorbid Conditions:  Adult:  None    PLAN:  Discussed with patient that given worsening of symptoms could have a bacterial infection on top of viral infection. Will treat with augmentin twice a day for 10 days. Educated this would cover for a sinus infection, otitis media, strep throat, and pneumonia. Will do polytrim eye drops for bacterial conjunctivitis. Will do tessalon three times a day as needed for cough and flonase to help with sinusitis. Additional symptomatic treatment recommendations discussed. Education was added to AVS. Patient was agreeable to plan and verbalized understanding.     Followup:    If not improving in 5-7 days  or if condition worsens, follow up with your Primary Care Provider    Patient Instructions   Augmentin twice a day for 10 days  polytrim eye drops four times a day for 7 days  Tessalon three times a day as needed for cough  flonase nasal spray daily  Push Fluids  Plenty of rest  Tylenol and ibuprofen to help with pain or fever  Humidified air can help with congestion  Salt water gargles, anesthetic throat spray, or lozenges to help with sore throat.

## 2019-12-26 DIAGNOSIS — G43.809 OTHER MIGRAINE WITHOUT STATUS MIGRAINOSUS, NOT INTRACTABLE: ICD-10-CM

## 2019-12-26 RX ORDER — NAPROXEN 500 MG/1
500 TABLET ORAL 2 TIMES DAILY PRN
Qty: 30 TABLET | Refills: 1 | Status: SHIPPED | OUTPATIENT
Start: 2019-12-26 | End: 2020-06-02

## 2019-12-26 NOTE — TELEPHONE ENCOUNTER
Routing refill request to provider for review/approval because:  Labs not current:  ALT/AST/CBC/CR  Tyler Perez RN, BSN

## 2020-01-04 ENCOUNTER — MYC REFILL (OUTPATIENT)
Dept: FAMILY MEDICINE | Facility: CLINIC | Age: 45
End: 2020-01-04

## 2020-01-04 DIAGNOSIS — F41.9 ANXIETY: ICD-10-CM

## 2020-01-06 ENCOUNTER — MYC MEDICAL ADVICE (OUTPATIENT)
Dept: FAMILY MEDICINE | Facility: CLINIC | Age: 45
End: 2020-01-06

## 2020-01-06 DIAGNOSIS — F41.9 ANXIETY: ICD-10-CM

## 2020-01-15 ENCOUNTER — HOSPITAL ENCOUNTER (OUTPATIENT)
Dept: MAMMOGRAPHY | Facility: CLINIC | Age: 45
Discharge: HOME | End: 2020-01-15
Attending: FAMILY MEDICINE | Admitting: FAMILY MEDICINE
Payer: COMMERCIAL

## 2020-01-15 DIAGNOSIS — Z12.31 VISIT FOR SCREENING MAMMOGRAM: ICD-10-CM

## 2020-01-15 PROCEDURE — 77063 BREAST TOMOSYNTHESIS BI: CPT

## 2020-01-23 ENCOUNTER — TRANSFERRED RECORDS (OUTPATIENT)
Dept: HEALTH INFORMATION MANAGEMENT | Facility: CLINIC | Age: 45
End: 2020-01-23

## 2020-01-23 LAB
CHOLEST SERPL-MCNC: 153 MG/DL
GLUCOSE SERPL-MCNC: 75 MG/DL (ref 50–100)
HDLC SERPL-MCNC: 68 MG/DL
HPV ABSTRACT: ABNORMAL
LDLC SERPL CALC-MCNC: 70 MG/DL
PAP-ABSTRACT: ABNORMAL
TRIGL SERPL-MCNC: 77 MG/DL
TSH SERPL-ACNC: 2.19 UIU/ML (ref 0.36–3.74)

## 2020-02-07 DIAGNOSIS — F41.9 ANXIETY: ICD-10-CM

## 2020-02-07 NOTE — TELEPHONE ENCOUNTER
Routing refill request to provider for review/approval because:  Pt has not been seen since 11/2018 she was to f/u in 1 month and never did this.     She has been given more than one joby RF and received calls and my charts, which she has read to f/u     Do you want to give another limited RF?    LM and sent my chart again today.     Melva Fischer RN

## 2020-02-16 ENCOUNTER — HEALTH MAINTENANCE LETTER (OUTPATIENT)
Age: 45
End: 2020-02-16

## 2020-02-24 DIAGNOSIS — F41.9 ANXIETY: ICD-10-CM

## 2020-02-24 NOTE — TELEPHONE ENCOUNTER
Pt had to reschedule due to PCP out last week.  Pt is rescheduled    Routing refill request to provider for review/approval because:  Drug interaction warning  Tyler Perez RN, BSN

## 2020-03-03 ENCOUNTER — OFFICE VISIT (OUTPATIENT)
Dept: FAMILY MEDICINE | Facility: CLINIC | Age: 45
End: 2020-03-03
Payer: COMMERCIAL

## 2020-03-03 VITALS
HEART RATE: 80 BPM | DIASTOLIC BLOOD PRESSURE: 93 MMHG | BODY MASS INDEX: 25.91 KG/M2 | SYSTOLIC BLOOD PRESSURE: 135 MMHG | HEIGHT: 70 IN | RESPIRATION RATE: 14 BRPM | WEIGHT: 181 LBS | TEMPERATURE: 98.4 F

## 2020-03-03 DIAGNOSIS — F41.9 ANXIETY: Primary | ICD-10-CM

## 2020-03-03 PROCEDURE — 99213 OFFICE O/P EST LOW 20 MIN: CPT | Performed by: FAMILY MEDICINE

## 2020-03-03 RX ORDER — ESCITALOPRAM OXALATE 10 MG/1
10 TABLET ORAL DAILY
Qty: 30 TABLET | Refills: 1 | Status: SHIPPED | OUTPATIENT
Start: 2020-03-03 | End: 2020-04-24

## 2020-03-03 RX ORDER — RIZATRIPTAN BENZOATE 5 MG/1
5 TABLET ORAL
COMMUNITY
Start: 2020-03-03

## 2020-03-03 ASSESSMENT — MIFFLIN-ST. JEOR: SCORE: 1556.26

## 2020-03-03 ASSESSMENT — PATIENT HEALTH QUESTIONNAIRE - PHQ9
5. POOR APPETITE OR OVEREATING: SEVERAL DAYS
SUM OF ALL RESPONSES TO PHQ QUESTIONS 1-9: 8

## 2020-03-03 ASSESSMENT — ANXIETY QUESTIONNAIRES
3. WORRYING TOO MUCH ABOUT DIFFERENT THINGS: SEVERAL DAYS
5. BEING SO RESTLESS THAT IT IS HARD TO SIT STILL: SEVERAL DAYS
7. FEELING AFRAID AS IF SOMETHING AWFUL MIGHT HAPPEN: SEVERAL DAYS
2. NOT BEING ABLE TO STOP OR CONTROL WORRYING: SEVERAL DAYS
GAD7 TOTAL SCORE: 7
IF YOU CHECKED OFF ANY PROBLEMS ON THIS QUESTIONNAIRE, HOW DIFFICULT HAVE THESE PROBLEMS MADE IT FOR YOU TO DO YOUR WORK, TAKE CARE OF THINGS AT HOME, OR GET ALONG WITH OTHER PEOPLE: SOMEWHAT DIFFICULT
1. FEELING NERVOUS, ANXIOUS, OR ON EDGE: SEVERAL DAYS
6. BECOMING EASILY ANNOYED OR IRRITABLE: SEVERAL DAYS

## 2020-03-03 NOTE — PATIENT INSTRUCTIONS
Take 1/2 tab sertraline and full tab lexapro for 1 week    then stop sertraline and continue 1 tab lexapro    4-6 weeks - reach out via [employer] with an update    Add senna to bowel regimen - titrate to stool every other day

## 2020-03-03 NOTE — PROGRESS NOTES
"Subjective     Radha Fuentes is a 44 year old female who presents to clinic today for the following health issues:    HPI   Medication Followup of sertraline    Taking Medication as prescribed: dropped down to 50mg due to constipation.     Side Effects:  GI issues    Medication Helping Symptoms:  Needs to be better.        Taking 50 mg daily, notes this is not adequate to manage her anxiety. Having some increased daily anxiety, but what is more bothersome is PMDD symptoms prior to her period and having a hard time sleeping due to \"feeling wound up\".     Was on lexapro in the past, worked well in her 20s.  Side effects with prozac and effexor.     Having constipation issues, ongoing for years but worsened on higher doses of sertraline. Using fiber and miralax daily. Eating high fiber foods. Drinking water. Getting fat in her diet.       Patient Active Problem List   Diagnosis     PCOS (polycystic ovarian syndrome)     Family history of aortic aneurysm- dad with marfan     Other migraine without status migrainosus, not intractable     H/O LEEP     Anxiety     Vertebral artery dissection (H)     Gastroesophageal reflux disease without esophagitis     Past Surgical History:   Procedure Laterality Date     ARTHROSCOPY KNEE  1991    left     Breast reconstruction with implants  1998     CHOLECYSTECTOMY  2010     TONSILLECTOMY  1996       Social History     Tobacco Use     Smoking status: Former Smoker     Smokeless tobacco: Never Used   Substance Use Topics     Alcohol use: Yes     Alcohol/week: 0.8 - 1.7 standard drinks     Types: 1 - 2 Standard drinks or equivalent per week     Comment: rare     Family History   Problem Relation Age of Onset     Marfan Syndrome Father      Heart Disease Mother         mitral valve replacement at age 64     Aneurysm Mother          Reviewed and updated as needed this visit by Provider  Tobacco  Allergies  Meds  Problems  Med Hx  Surg Hx  Fam Hx         Review of Systems   ROS " "COMP: Constitutional, HEENT, cardiovascular, pulmonary, gi and gu systems are negative, except as otherwise noted.      Objective    BP (!) 135/93 (BP Location: Right arm, Patient Position: Sitting, Cuff Size: Adult Regular)   Pulse 80   Temp 98.4  F (36.9  C) (Oral)   Resp 14   Ht 1.778 m (5' 10\")   Wt 82.1 kg (181 lb)   Breastfeeding No   BMI 25.97 kg/m    Body mass index is 25.97 kg/m .  Physical Exam   GENERAL: healthy, alert and no distress  PSYCH: mentation appears normal, affect normal/bright    Diagnostic Test Results:  Labs reviewed in Epic        Assessment & Plan     1. Anxiety - will switch medications due to constipation per her request. Follow up in 4-6 weeks to review progress. Advised senna for ongoing constipation issues.   - escitalopram (LEXAPRO) 10 MG tablet; Take 1 tablet (10 mg) by mouth daily  Dispense: 30 tablet; Refill: 1       Return in about 6 months (around 9/3/2020) for Medication Recheck.    Sumi Carreon MD  [facility]        "

## 2020-03-04 ASSESSMENT — ANXIETY QUESTIONNAIRES: GAD7 TOTAL SCORE: 7

## 2020-03-20 ENCOUNTER — MYC MEDICAL ADVICE (OUTPATIENT)
Dept: FAMILY MEDICINE | Facility: CLINIC | Age: 45
End: 2020-03-20

## 2020-03-20 DIAGNOSIS — F41.9 ANXIETY: Primary | ICD-10-CM

## 2020-03-24 RX ORDER — ESCITALOPRAM OXALATE 20 MG/1
20 TABLET ORAL DAILY
Qty: 30 TABLET | Refills: 1 | Status: SHIPPED | OUTPATIENT
Start: 2020-03-24 | End: 2020-04-24

## 2020-03-24 NOTE — TELEPHONE ENCOUNTER
Let's increase dose to 20 mg daily and see how she does. She's likely underdosed on lexapro - that's why I wanted her to reach out.     Yes, not a good idea to be on 2 different SSRIs at same time. JH

## 2020-04-17 NOTE — TELEPHONE ENCOUNTER
Renee see my chart - pt asking about increasing dosage again.     Ok to do so virtual visit    Melva Fischer RN

## 2020-04-17 NOTE — TELEPHONE ENCOUNTER
There is no increased dose of lexapro - she is on it.     If she is still uncontrolled, let's schedule a virtual visit for Monday. JH

## 2020-05-03 DIAGNOSIS — G43.809 OTHER MIGRAINE WITHOUT STATUS MIGRAINOSUS, NOT INTRACTABLE: Primary | ICD-10-CM

## 2020-05-04 NOTE — TELEPHONE ENCOUNTER
Routing refill request to provider for review/approval because:  Drug not active on patient's medication list    Brianna Loving, RN, BSN

## 2020-05-05 RX ORDER — NARATRIPTAN 2.5 MG/1
2.5 TABLET ORAL
Qty: 27 TABLET | Refills: 3 | Status: SHIPPED | OUTPATIENT
Start: 2020-05-05 | End: 2021-05-14

## 2020-06-01 DIAGNOSIS — G43.809 OTHER MIGRAINE WITHOUT STATUS MIGRAINOSUS, NOT INTRACTABLE: ICD-10-CM

## 2020-06-02 RX ORDER — NAPROXEN 500 MG/1
500 TABLET ORAL 2 TIMES DAILY PRN
Qty: 30 TABLET | Refills: 1 | Status: SHIPPED | OUTPATIENT
Start: 2020-06-02 | End: 2020-09-14

## 2020-08-01 ENCOUNTER — OFFICE VISIT (OUTPATIENT)
Dept: URGENT CARE | Facility: URGENT CARE | Age: 45
End: 2020-08-01
Payer: COMMERCIAL

## 2020-08-01 VITALS
TEMPERATURE: 97.9 F | SYSTOLIC BLOOD PRESSURE: 128 MMHG | BODY MASS INDEX: 27.03 KG/M2 | HEART RATE: 85 BPM | WEIGHT: 188.38 LBS | OXYGEN SATURATION: 99 % | DIASTOLIC BLOOD PRESSURE: 82 MMHG

## 2020-08-01 DIAGNOSIS — R11.0 NAUSEA: ICD-10-CM

## 2020-08-01 DIAGNOSIS — G43.809 OTHER MIGRAINE WITHOUT STATUS MIGRAINOSUS, NOT INTRACTABLE: Primary | ICD-10-CM

## 2020-08-01 PROCEDURE — 99214 OFFICE O/P EST MOD 30 MIN: CPT | Mod: 25 | Performed by: FAMILY MEDICINE

## 2020-08-01 PROCEDURE — 96372 THER/PROPH/DIAG INJ SC/IM: CPT | Performed by: FAMILY MEDICINE

## 2020-08-01 RX ORDER — ONDANSETRON 4 MG/1
4 TABLET, ORALLY DISINTEGRATING ORAL ONCE
Status: COMPLETED | OUTPATIENT
Start: 2020-08-01 | End: 2020-08-01

## 2020-08-01 RX ORDER — KETOROLAC TROMETHAMINE 30 MG/ML
60 INJECTION, SOLUTION INTRAMUSCULAR; INTRAVENOUS ONCE
Status: DISCONTINUED | OUTPATIENT
Start: 2020-08-01 | End: 2020-08-01 | Stop reason: ALTCHOICE

## 2020-08-01 RX ORDER — PROMETHAZINE HYDROCHLORIDE 25 MG/ML
25 INJECTION INTRAMUSCULAR; INTRAVENOUS ONCE
Status: DISCONTINUED | OUTPATIENT
Start: 2020-08-01 | End: 2020-08-01 | Stop reason: ALTCHOICE

## 2020-08-01 RX ORDER — KETOROLAC TROMETHAMINE 30 MG/ML
60 INJECTION, SOLUTION INTRAMUSCULAR; INTRAVENOUS ONCE
Status: COMPLETED | OUTPATIENT
Start: 2020-08-01 | End: 2020-08-01

## 2020-08-01 RX ORDER — PROMETHAZINE HYDROCHLORIDE 25 MG/1
25 TABLET ORAL EVERY 8 HOURS PRN
Qty: 20 TABLET | Refills: 0 | Status: SHIPPED | OUTPATIENT
Start: 2020-08-01

## 2020-08-01 RX ADMIN — ONDANSETRON 4 MG: 4 TABLET, ORALLY DISINTEGRATING ORAL at 16:13

## 2020-08-01 RX ADMIN — KETOROLAC TROMETHAMINE 60 MG: 30 INJECTION, SOLUTION INTRAMUSCULAR; INTRAVENOUS at 16:12

## 2020-08-01 NOTE — PROGRESS NOTES
SUBJECTIVE:  Radha Fuentes is a 45 year old female with h/o headache who comes in for evaluation of headache.  Headache began 4day(s)}ago and is sudden onset  DESCRIPTION OF HEADACHE:   Location of pain: right-sided unilateral   Radiation of pain?: NO   Character of pain:sharp and throbbing   Severity of pain: moderate   Accompanying symptoms: nausea and photophobia   Prodromal sx?: nausea   Rapidity of onset: abrupt     History of Migranes: Yes   Are most headaches similar in presentation? YES    TYPICAL PRECIPITANTS OF HEADACHE: menses    CURRENT USE OF MEDS TO TREAT HA:   Abortive meds? naratriptan    Daily use? NO   Prophylactic meds? none    ADDITIONAL RELEVANT HISTORY:  Exposure to carbon monoxide? NO  Substance use: denies any use  Neurologic ROS: Denies, dizziness, syncope, focal weakness, sensory deficits and paresthesias.    Past Medical History:   Diagnosis Date     Gallstone      Migraine     sees neurologist-      PCOS (polycystic ovarian syndrome)     on metformin     Current Outpatient Medications   Medication Sig Dispense Refill     metFORMIN (GLUCOPHAGE-XR) 500 MG 24 hr tablet Take 2,000 mg by mouth At Bedtime       naproxen (NAPROSYN) 500 MG tablet Take 1 tablet (500 mg) by mouth 2 times daily as needed for moderate pain 30 tablet 1     naratriptan (AMERGE) 2.5 MG tablet Take 1 tablet (2.5 mg) by mouth at onset of headache for migraine May repeat in 4 hours. Max 2 tablets/24 hours. 27 tablet 3     promethazine (PHENERGAN) 25 MG tablet Take 1 tablet (25 mg) by mouth every 8 hours as needed for nausea 20 tablet 0     rizatriptan (MAXALT) 5 MG tablet Take 1 tablet (5 mg) by mouth at onset of headache for migraine May repeat in 2 hours. Max 6 tablets/24 hours.       sertraline (ZOLOFT) 50 MG tablet Take 1 tablet (50 mg) by mouth At Bedtime 90 tablet 3     Social History     Tobacco Use     Smoking status: Former Smoker     Smokeless tobacco: Never Used   Substance Use Topics     Alcohol use: Yes      Alcohol/week: 0.8 - 1.7 standard drinks     Types: 1 - 2 Standard drinks or equivalent per week     Comment: rare       ROS:  10 point ROS of systems including Constitutional, Eyes, Respiratory, Cardiovascular, Gastroenterology, Genitourinary, Integumentary, Muscularskeletal, Psychiatric were all negative except for pertinent positives noted in my HPI         OBJECTIVE:  /82 (BP Location: Right arm, Patient Position: Chair, Cuff Size: Adult Regular)   Pulse 85   Temp 97.9  F (36.6  C) (Oral)   Wt 85.4 kg (188 lb 6 oz)   LMP 07/31/2020 (Exact Date)   SpO2 99%   BMI 27.03 kg/m    GENERAL APPEARANCE: healthy, alert and no distress  EYES: EOMI,  PERRL, conjunctiva clear  HENT: ear canals and TM's normal.  Nose and mouth without ulcers, erythema or lesions  NECK: supple, nontender, no lymphadenopathy  RESP: lungs clear to auscultation - no rales, rhonchi or wheezes  CV: regular rates and rhythm, normal S1 S2, no murmur noted  ABDOMEN:  soft, nontender, no HSM or masses and bowel sounds normal  NEURO: Normal strength and tone, sensory exam grossly normal,  normal speech and mentation  Finger nose test intact   SKIN: no suspicious lesions or rashes  PSYCH: mentation appears normal      Differential diagnosis migraine headache/cluster headache/tension headache/sinus headache/meningitis/      aSSESSMENT:  Radha was seen today for headache.    Diagnoses and all orders for this visit:    Other migraine without status migrainosus, not intractable  -     Discontinue: ketorolac (TORADOL) injection 60 mg  -     ketorolac (TORADOL) injection 60 mg    Nausea  -     Discontinue: promethazine (PHENERGAN) intraMUSCULAR injection 25 mg  -     promethazine (PHENERGAN) 25 MG tablet; Take 1 tablet (25 mg) by mouth every 8 hours as needed for nausea  -     ondansetron (ZOFRAN-ODT) ODT tab 4 mg          PLAN:  See orders in Epic  She did she did feel way better after getting Toradol shot but as there was no thinning and patient  was given Zofran to help with the nausea.  Did call in some Phenergan tablets to help her with the nausea and also help her with the sleep if symptoms do not get better patient to give me a call.  She understood and agreed with plan  Follow up if  symptoms fail to improve or worsens   Pt understood and agreed with plan       Azra Kaminski MD

## 2020-09-12 DIAGNOSIS — G43.809 OTHER MIGRAINE WITHOUT STATUS MIGRAINOSUS, NOT INTRACTABLE: ICD-10-CM

## 2020-09-14 RX ORDER — NAPROXEN 500 MG/1
500 TABLET ORAL 2 TIMES DAILY PRN
Qty: 30 TABLET | Refills: 0 | Status: SHIPPED | OUTPATIENT
Start: 2020-09-14 | End: 2020-12-18

## 2020-09-14 NOTE — TELEPHONE ENCOUNTER
Routing refill request to provider for review/approval because:  Labs not current:  All labs not done since 2018  Tyler Perez RN, BSN

## 2020-10-07 ENCOUNTER — VIRTUAL VISIT (OUTPATIENT)
Dept: FAMILY MEDICINE | Facility: OTHER | Age: 45
End: 2020-10-07

## 2020-10-08 DIAGNOSIS — Z20.822 SUSPECTED COVID-19 VIRUS INFECTION: Primary | ICD-10-CM

## 2020-10-08 NOTE — PROGRESS NOTES
"Date: 10/07/2020 20:18:48  Clinician: Toya Shabazz  Clinician NPI: [id number]  Patient: Radha Fuentes  Patient : 1975  Patient Address: [address] [place] [id number]  Patient Phone: [phone]  Visit Protocol: URI  Patient Summary:  Radha is a 45 year old ( : 1975 ) female who initiated a [facility] Visit for COVID-19 (Coronavirus) evaluation and screening. When asked the question \"Please sign me up to receive news, health information and promotions. \", Radha responded \"No\".    Radha states her symptoms started 1-2 days ago.   Her symptoms consist of a headache, enlarged lymph nodes, a cough, nasal congestion, rhinitis, facial pain or pressure, myalgia, chills, malaise, and a sore throat. Radha also feels feverish.   Symptom details     Nasal secretions: The color of her mucus is clear.    Cough: Radha coughs a few times an hour and her cough is more bothersome at night. Phlegm does not come into her throat when she coughs. She does not believe her cough is caused by post-nasal drip.     Sore throat: Radha reports having mild throat pain (1-3 on a 10 point pain scale), does not have exudate on her tonsils, and can swallow liquids. The lymph nodes in her neck are enlarged. A rash has not appeared on the skin since the sore throat started.     Temperature: Her current temperature is 102 degrees Fahrenheit. Radha has had a temperature over 100 degrees Fahrenheit for 1-2 days.     Facial pain or pressure: The facial pain or pressure feels worse when bending over or leaning forward.     Headache: She states the headache is moderate (4-6 on a 10 point pain scale).      Radha denies having ear pain, wheezing, anosmia, vomiting, nausea, teeth pain, ageusia, and diarrhea. She also denies having a sinus infection within the past year, taking antibiotic medication in the past month, and having recent facial or sinus surgery in the past 60 days. She is not experiencing dyspnea.   " Precipitating events  Within the past week, Radha has not been exposed to someone with strep throat. She has not recently been exposed to someone with influenza. Radha has not been in close contact with any high risk individuals.   Pertinent COVID-19 (Coronavirus) information  In the past 14 days, Radha has not worked in a congregate living setting.   She does not work or volunteer as healthcare worker or a  and does not work or volunteer in a healthcare facility.   Radha also has not lived in a congregate living setting in the past 14 days. She does not live with a healthcare worker.   Radha has not had a close contact with a laboratory-confirmed COVID-19 patient within 14 days of symptom onset.   Since December 2019, Radha and has had upper respiratory infection (URI) or influenza-like illness. Has not been diagnosed with lab-confirmed COVID-19 test      Date(s) of previous URI or influenza-like illness (free-text): 7- - 7-     Symptoms Radha experienced during previous URI or influenza-like illness as reported by the patient (free-text): Cough, congestion, low grade fever, headache, body aches        Pertinent medical history  Radha does not get yeast infections when she takes antibiotics.   Radha does not need a return to work/school note.   Weight: 185 lbs   Radha does not smoke or use smokeless tobacco.   She denies pregnancy and denies breastfeeding. She has menstruated in the past month.   Additional information as reported by the patient (free text): I take metformin for PCOS, I am not diabetic.   Weight: 185 lbs    MEDICATIONS: metformin oral, sertraline oral, Tylenol oral, Emergen-C Immune Plus oral, ALLERGIES: NKDA  Clinician Response:  Dear Radha,   Your symptoms show that you may have coronavirus (COVID-19). This illness can cause fever, cough and trouble breathing. Many people get a mild case and get better on their own. Some people can get very sick.  What  "should I do?  We would like to test you for this virus.   1. Please call [phone] to schedule your visit. Explain that you were referred by [facility] to have a COVID-19 test. Be ready to share your [facility] visit ID number.  The following will serve as your written order for this COVID Test, ordered by me, for the indication of suspected COVID [Z20.828]: The test will be ordered in [employer], our electronic health record, after you are scheduled. It will show as ordered and authorized by Kevon Cramer MD.  Order: COVID-19 (Coronavirus) PCR for SYMPTOMATIC testing from [facility].      2. When it's time for your COVID test:  Stay at least 6 feet away from others. (If someone will drive you to your test, stay in the backseat, as far away from the  as you can.)   Cover your mouth and nose with a mask, tissue or washcloth.  Go straight to the testing site. Don't make any stops on the way there or back.      3.Starting now: Stay home and away from others (self-isolate) until:   You've had no fever---and no medicine that reduces fever---for one full day (24 hours). And...   Your other symptoms have gotten better. For example, your cough or breathing has improved. And...   At least 10 days have passed since your symptoms started.       During this time, don't leave the house except for testing or medical care.   Stay in your own room, even for meals. Use your own bathroom if you can.   Stay away from others in your home. No hugging, kissing or shaking hands. No visitors.  Don't go to work, school or anywhere else.    Clean \"high touch\" surfaces often (doorknobs, counters, handles, etc.). Use a household cleaning spray or wipes. You'll find a full list of  on the EPA website: www.epa.gov/pesticide-registration/list-n-disinfectants-use-against-sars-cov-2.   Cover your mouth and nose with a mask, tissue or washcloth to avoid spreading germs.  Wash your hands and face often. Use soap and water.  Caregivers in these groups " are at risk for severe illness due to COVID-19:  o People 65 years and older  o People who live in a nursing home or long-term care facility  o People with chronic disease (lung, heart, cancer, diabetes, kidney, liver, immunologic)  o People who have a weakened immune system, including those who:   Are in cancer treatment  Take medicine that weakens the immune system, such as corticosteroids  Had a bone marrow or organ transplant  Have an immune deficiency  Have poorly controlled HIV or AIDS  Are obese (body mass index of 40 or higher)  Smoke regularly   o Caregivers should wear gloves while washing dishes, handling laundry and cleaning bedrooms and bathrooms.  o Use caution when washing and drying laundry: Don't shake dirty laundry, and use the warmest water setting that you can.  o For more tips, go to www.cdc.gov/coronavirus/2019-ncov/downloads/10Things.pdf.    4.Sign up for [employer]. We know it's scary to hear that you might have COVID-19. We want to track your symptoms to make sure you're okay over the next 2 weeks. Please look for an email from Health News---this is a free, online program that we'll use to keep in touch. To sign up, follow the link in the email. Learn more at [email or web address]  How can I take care of myself?   Get lots of rest. Drink extra fluids (unless a doctor has told you not to).   Take Tylenol (acetaminophen) for fever or pain. If you have liver or kidney problems, ask your family doctor if it's okay to take Tylenol.   Adults can take either:    650 mg (two 325 mg pills) every 4 to 6 hours, or...   1,000 mg (two 500 mg pills) every 8 hours as needed.    Note: Don't take more than 3,000 mg in one day. Acetaminophen is found in many medicines (both prescribed and over-the-counter medicines). Read all labels to be sure you don't take too much.   For children, check the Tylenol bottle for the right dose. The dose is based on the child's age or weight.    If you have  other health problems (like cancer, heart failure, an organ transplant or severe kidney disease): Call your specialty clinic if you don't feel better in the next 2 days.       Know when to call 911. Emergency warning signs include:    Trouble breathing or shortness of breath Pain or pressure in the chest that doesn't go away Feeling confused like you haven't felt before, or not being able to wake up Bluish-colored lips or face.  Where can I get more information?   [facility] -- About COVID-19: [email or web address]   CDC -- What to Do If You're Sick: www.cdc.gov/coronavirus/2019-ncov/about/steps-when-sick.html   CDC -- Ending Home Isolation: www.cdc.gov/coronavirus/2019-ncov/hcp/disposition-in-home-patients.html   [facility] -- Caring for Someone: www.cdc.gov/coronavirus/2019-ncov/if-you-are-sick/care-for-someone.html   [facility] -- Interim Guidance for Hospital Discharge to Home: www.[facility].mn./diseases/coronavirus/hcp/hospdischarge.pdf   [facility] clinical trials (COVID-19 research studies): clinicalaffairs.[facility]-clinical-trials    Below are the COVID-19 hotlines at the [facility] ([facility]). Interpreters are available.    For health questions: Call 817-335-2531 or 1-745.289.7797 (7 a.m. to 7 p.m.) For questions about schools and childcare: Call [phone] or [phone] (7 a.m. to 7 p.m.)    Diagnosis: Fever, unspecified  Diagnosis ICD: R50.9

## 2020-10-09 ENCOUNTER — TELEPHONE (OUTPATIENT)
Dept: FAMILY MEDICINE | Facility: CLINIC | Age: 45
End: 2020-10-09

## 2020-10-09 NOTE — TELEPHONE ENCOUNTER
Patient calling and states she is COVID + through [facility] yesterday.  [place] like she was going to pass out about 15 minutes ago so she took her BP and it was 86/58.   took his and his bp was OK.  BP monitor is from when she was pregnant and a bit old.  Not on bp medications.  Feels she is staying hydrated.  Took Nyquil last night.  Laying down with feet up and drinking fluids so feeling better.  Advised to get some extra fluids in and recheck bp and if still low and not feeling well to call back.  Advised ER if increasing symptoms.  Advised I would also route Dr. Carreon the message.  Roxi Gonsales RN

## 2020-10-16 ENCOUNTER — OFFICE VISIT (OUTPATIENT)
Dept: URGENT CARE | Facility: URGENT CARE | Age: 45
End: 2020-10-16
Payer: COMMERCIAL

## 2020-10-16 ENCOUNTER — ANCILLARY PROCEDURE (OUTPATIENT)
Dept: GENERAL RADIOLOGY | Facility: CLINIC | Age: 45
End: 2020-10-16
Attending: FAMILY MEDICINE
Payer: COMMERCIAL

## 2020-10-16 VITALS
HEART RATE: 106 BPM | RESPIRATION RATE: 15 BRPM | SYSTOLIC BLOOD PRESSURE: 138 MMHG | DIASTOLIC BLOOD PRESSURE: 90 MMHG | OXYGEN SATURATION: 100 % | TEMPERATURE: 98.5 F

## 2020-10-16 DIAGNOSIS — U07.1 2019 NOVEL CORONAVIRUS DISEASE (COVID-19): ICD-10-CM

## 2020-10-16 DIAGNOSIS — U07.1 2019 NOVEL CORONAVIRUS DISEASE (COVID-19): Primary | ICD-10-CM

## 2020-10-16 LAB — ERYTHROCYTE [SEDIMENTATION RATE] IN BLOOD BY WESTERGREN METHOD: 13 MM/H (ref 0–20)

## 2020-10-16 PROCEDURE — 85652 RBC SED RATE AUTOMATED: CPT | Performed by: FAMILY MEDICINE

## 2020-10-16 PROCEDURE — 71046 X-RAY EXAM CHEST 2 VIEWS: CPT | Performed by: RADIOLOGY

## 2020-10-16 PROCEDURE — 99214 OFFICE O/P EST MOD 30 MIN: CPT | Performed by: FAMILY MEDICINE

## 2020-10-16 PROCEDURE — 36415 COLL VENOUS BLD VENIPUNCTURE: CPT | Performed by: FAMILY MEDICINE

## 2020-10-16 ASSESSMENT — ENCOUNTER SYMPTOMS
CHEST TIGHTNESS: 1
SHORTNESS OF BREATH: 0
FATIGUE: 1
PALPITATIONS: 0
FEVER: 0

## 2020-10-16 NOTE — PATIENT INSTRUCTIONS
Patient Education     Coronavirus Disease 2019 (COVID-19): Caring for Yourself or Others   If you or a household member have symptoms of COVID-19, follow the guidelines below. This will help you manage symptoms and keep the virus from spreading.  If you have symptoms of COVID-19    Stay home and contact your care team. They will tell you what to do    Don t panic. Keep in mind that other illnesses can cause similar symptoms.    Stay away from work, school, and public places.    Limit physical contact with others in your home. Limit visitors. No kissing.    Clean surfaces you touch with disinfectant.    If you need to cough or sneeze, do it into a tissue. Then, throw the tissue into the trash. If you don't have tissues, cough or sneeze into the bend of your elbow.    Don t share food or personal items with people in your household. This includes items like eating and drinking utensils, towels, and bedding.    Wear a cloth face mask around other people. It should cover your nose and mouth. You may need to make your own mask using a bandana, T-shirt, or other cloth. See the CDC s instructions on how to make a mask.    If you need to go to a hospital or clinic, call ahead to let them know. Expect the care team to wear masks, gowns, gloves, and eye protection. You may be put in a separate room.    Follow all instructions from your care team.    If you have been diagnosed with COVID-19    Stay home and away from others, including other people in your home. (This is called self-isolation.) Don t leave home unless you need to get medical care. Don't go to work, school, or public places. Don't use buses, taxis, or other public transportation.    Follow all instructions from your care team.    If you need to go to a hospital or clinic, call ahead to let them know. Expect the care team to wear masks, gowns, gloves, and eye protection. You may be put in a separate room.    Wear a face mask over your nose and mouth. This is to  protect others from your germs. If you can t wear a mask, your caregivers should wear one. You may need to make your own mask using a bandana, T-shirt, or other cloth. See the CDC s instructions on how to make a mask.    Have no contact with pets and other animals.    Don t share food or personal items with people in your household. This includes items like eating and drinking utensils, towels, and bedding.    Don t share food or personal items with people in your household. This includes items like eating and drinking utensils, towels, and bedding.    If you need to cough or sneeze, do it into a tissue. Then, throw the tissue into the trash. If you don't have tissues, cough or sneeze into the bend of your elbow.    Wash your hands often.    Self-care at home   At this time, there is no medicine approved to prevent or treat COVID-19. Experts are testing different medicines, trying to find one that works.  So far, the most proven treatment is to support your body while it fights the virus.    Get extra rest.    Drink extra fluids (6 to 8 glasses of liquids each day), unless a doctor has told you not to. Ask your care team which fluids are best for you. Avoid fluids that contain caffeine or alcohol.    Taking over-the-counter (OTC) pain medicine to reduce pain and fever. Your care team will tell you which medicine to use.  If you ve been in the hospital for COVID-19, follow your care team s instructions. They will tell you when to stop self-isolation. They may also have you change positions to help your breathing (such as lying on your belly).  If a test showed that you have COVID-19, you may be asked to donate plasma after you ve recovered. (This is called COVID-19 convalescent plasma donation.) The plasma may contain antibodies to help fight the virus in others. Scientists are testing whether this might be a treatment in the future. For more information, talk to your care team.  Caring for a sick person     Follow  all instructions from the care team.    Wash your hands often.    Wear protective clothing as advised.    Make sure the sick person wears a mask. If they can't wear a mask, don't stay in the same room with them. If you must be in the same room, wear a face mask. Make sure the mask covers both the nose and mouth.    Keep track of the sick person s symptoms.    Clean surfaces often with disinfectant. This includes phones, kitchen counters, fridge door handles, bathroom surfaces, and others.    Don t let anyone share household items with the sick person. This includes eating and drinking tools, towels, sheets, and blankets.    Clean fabrics and laundry well.    Keep other people and pets away from the sick person.    When you can stop self-isolation  When you are sick with COVID-19, you should stay away from other people. This is called self-isolation. The rules for ending self-isolation depend on your health in general.  If you are normally healthy  You can stop self-isolation when all 3 of these are true:  1. You ve had no fever--and no medicine that reduces fever--for at least 24 hours. And   2. Your symptoms are better, such as cough or trouble breathing. And   3. At least 10 days have passed since your symptoms first started.  Talk with your care team before you leave home. They may tell you it s okay to leave, or they may give you different advice.  If you have a weak immune system  If you re being treated for cancer, have an immune disorder (such as HIV), or have had a transplant (organ or bone marrow), follow your care team s instructions. You may be able to end self-isolation when all 3 of these are true:  1. You have no fever without fever-reducing medicine. And   2. Your symptoms are better, such as cough or trouble breathing. And   3. You ve had 2 tests for COVID-19. The tests happened at least 24 hours apart, and both show that you don t have the virus. (If no tests are available, your care team may tell  you to follow the rules for normally healthy people above.)  When you return to public settings  When you are well enough to go outside your home, follow the CDC s guidance on cloth face masks.    Anyone over age 2 should wear a face mask in public, especially when it's hard to socially distance. This includes public transit, public protests and marches, and crowded stores, bars, and restaurants.    Face masks are most likely to reduce the spread of COVID-19 when they are widely used by people who are out in the public.  Certain people should not wear a face covering. These include:     Children under 2 years old    Anyone with a health, developmental, or mental health condition that can be made worse by wearing a mask    Anyone who is unconscious or unable to remove the face covering without help. See the CDC's guidance on who should not wear a face mask.  When to call your care team  Call your care team right away if a sick person has any of these:    Trouble breathing    Pain or pressure in chest  If a sick person has any of these, call 911:    Trouble breathing that gets worse    Pain or pressure in chest that gets worse    Blue tint to lips or face    Fast or irregular heartbeat    Confusion or trouble waking    Fainting or loss of consciousness    Coughing up blood  Going home from the hospital   If you have COVID-19 and were recently in the hospital:    Follow the instructions above for self-care and isolation.    Follow the hospital care team s instructions.    Ask questions if anything is unclear to you. Write down answers so you remember them.  Date last modified: 8/12/2020  "[employer] last reviewed this educational content on 4/1/2020 2000-2020 [employer]. [address] [place] [address], [place] [address]. All rights reserved. This information is not intended as a substitute for professional medical care. Always follow your healthcare professional's instructions.

## 2020-10-16 NOTE — PROGRESS NOTES
Subjective     Radha Fuentes is a 45 year old female who presents to clinic today for the following health issues:    HPI         Patient is a 45-year-old female with a recent positive diagnosis of COVID-19, had swab done at [facility] who presents to urgent care with concerns for the breathing.  She is requesting a chest x-ray to ensure she does not have any pneumonia.  Symptoms started 10 days ago, initially had fever, multiple joint and body aches.  States she has a home pulse oximeter and has been taking her temperature daily.  Body aches have improved however she feels like it is difficult getting a full breath.  Denies any chest pain or dizziness.  Continues to have decrease in loss of taste and smell.  Has been using Flonase daily.    Review of Systems   Constitutional: Positive for fatigue. Negative for fever.   Respiratory: Positive for chest tightness. Negative for shortness of breath.    Cardiovascular: Negative for palpitations.      Past Medical History:   Diagnosis Date     2019 novel coronavirus disease (COVID-19)      Gallstone      Migraine     sees neurologist-      PCOS (polycystic ovarian syndrome)     on metformin           Objective    BP (!) 138/90   Pulse 106   Temp 98.5  F (36.9  C)   Resp 15   SpO2 100%   There is no height or weight on file to calculate BMI.  Physical Exam  Vitals signs reviewed.   Constitutional:       General: She is not in acute distress.     Appearance: Normal appearance. She is not ill-appearing or diaphoretic.   Cardiovascular:      Rate and Rhythm: Regular rhythm. Tachycardia present.   Pulmonary:      Effort: Pulmonary effort is normal.      Breath sounds: Normal breath sounds.   Neurological:      Mental Status: She is alert.   Psychiatric:      Comments: Anxious.              Chest x-ray, independently read by me.  Formal radio diagnostic read pending.  Compared to study to 7-.  Today's x-ray, 10- does not show any obvious  pulmonary infiltrates.       Assessment & Plan       Provider wore face shield, surgical mask,N95, gown and gloves throughout the entire encounter.      2019 novel coronavirus disease (COVID-19)  Acute problem.  No clinical findings to support a lower respiratory tract infection such as pneumonia.  She does have a mildly elevated blood pressure and patient is concerned for lingering systemic symptoms.  I did discuss we could consider following ESR's for patient to monitor her progression of disease and for monitoring systemic inflammation she is agreeable.  Otherwise to new conservative management including Tylenol, ibuprofen and Flonase as needed.  If having new onset of fever, cough, worsening difficulty breathing, advised she should for urgent evaluation.  Otherwise, reviewed CDC guidelines with patient.  - XR Chest 2 Views; Future  - ESR: Erythrocyte sedimentation rate      Return in about 1 month (around 11/16/2020) for If symptoms do not improve or gets worse..    Jesse Mccormack MD  [facility]

## 2020-11-05 ENCOUNTER — TRANSFERRED RECORDS (OUTPATIENT)
Dept: HEALTH INFORMATION MANAGEMENT | Facility: CLINIC | Age: 45
End: 2020-11-05

## 2020-11-29 ENCOUNTER — HEALTH MAINTENANCE LETTER (OUTPATIENT)
Age: 45
End: 2020-11-29

## 2020-12-10 ENCOUNTER — TRANSFERRED RECORDS (OUTPATIENT)
Dept: HEALTH INFORMATION MANAGEMENT | Facility: CLINIC | Age: 45
End: 2020-12-10

## 2020-12-17 DIAGNOSIS — G43.809 OTHER MIGRAINE WITHOUT STATUS MIGRAINOSUS, NOT INTRACTABLE: ICD-10-CM

## 2020-12-18 RX ORDER — NAPROXEN 500 MG/1
500 TABLET ORAL 2 TIMES DAILY PRN
Qty: 30 TABLET | Refills: 0 | Status: SHIPPED | OUTPATIENT
Start: 2020-12-18 | End: 2021-04-16

## 2020-12-18 NOTE — TELEPHONE ENCOUNTER
Routing refill request to provider for review/approval because:  Labs out of range:  bp  Labs not current:  No labs since 2018  Tyler Perez RN, BSN

## 2020-12-21 ENCOUNTER — TRANSFERRED RECORDS (OUTPATIENT)
Dept: HEALTH INFORMATION MANAGEMENT | Facility: CLINIC | Age: 45
End: 2020-12-21

## 2021-01-12 ENCOUNTER — OFFICE VISIT (OUTPATIENT)
Dept: URGENT CARE | Facility: URGENT CARE | Age: 46
End: 2021-01-12
Payer: COMMERCIAL

## 2021-01-12 VITALS
HEART RATE: 98 BPM | SYSTOLIC BLOOD PRESSURE: 132 MMHG | TEMPERATURE: 98.3 F | DIASTOLIC BLOOD PRESSURE: 79 MMHG | OXYGEN SATURATION: 98 % | RESPIRATION RATE: 16 BRPM

## 2021-01-12 DIAGNOSIS — G43.809 OTHER MIGRAINE WITHOUT STATUS MIGRAINOSUS, NOT INTRACTABLE: Primary | ICD-10-CM

## 2021-01-12 PROBLEM — E56.9 VITAMIN DEFICIENCY: Status: ACTIVE | Noted: 2018-08-14

## 2021-01-12 PROBLEM — I10 ESSENTIAL (PRIMARY) HYPERTENSION: Status: ACTIVE | Noted: 2018-08-14

## 2021-01-12 PROBLEM — I77.74 VERTEBRAL ARTERY DISSECTION (H): Status: ACTIVE | Noted: 2018-08-11

## 2021-01-12 PROCEDURE — 96372 THER/PROPH/DIAG INJ SC/IM: CPT | Performed by: PHYSICIAN ASSISTANT

## 2021-01-12 PROCEDURE — 99214 OFFICE O/P EST MOD 30 MIN: CPT | Mod: 25 | Performed by: PHYSICIAN ASSISTANT

## 2021-01-12 RX ORDER — KETOROLAC TROMETHAMINE 30 MG/ML
60 INJECTION, SOLUTION INTRAMUSCULAR; INTRAVENOUS ONCE
Status: COMPLETED | OUTPATIENT
Start: 2021-01-12 | End: 2021-01-12

## 2021-01-12 RX ORDER — ONDANSETRON 4 MG/1
4 TABLET, ORALLY DISINTEGRATING ORAL ONCE
Status: COMPLETED | OUTPATIENT
Start: 2021-01-12 | End: 2021-01-12

## 2021-01-12 RX ADMIN — ONDANSETRON 4 MG: 4 TABLET, ORALLY DISINTEGRATING ORAL at 18:31

## 2021-01-12 RX ADMIN — KETOROLAC TROMETHAMINE 60 MG: 30 INJECTION, SOLUTION INTRAMUSCULAR; INTRAVENOUS at 18:34

## 2021-01-13 NOTE — PROGRESS NOTES
Clinic Administered Medication Documentation      Injectable Medication Documentation    Patient was given Ketorolac Tromethamine (Toradol). Prior to medication administration, verified patients identity using patient s name and date of birth. Please see MAR and medication order for additional information. Patient instructed to remain in clinic for 15 minutes, report any adverse reaction to staff immediately  and stay in clinic after the injection but patient declined.      Was entire vial of medication used? Yes  Vial/Syringe: Single dose vial  Expiration Date:  05/21  Was this medication supplied by the patient? No  Oral Medication Documentation    Patient was given Ondansetron (Zofran) . Prior to medication administration, verified patients identity using patient s name and date of birth. Please see MAR and medication order for additional information.     Was entire amount of medication used? Yes  Expiration Date: 05/21

## 2021-01-13 NOTE — PROGRESS NOTES
Chief Complaint   Patient presents with     Urgent Care     Headache     Migraine headache on and off for 7days-Pain w/neck movement        ASSESSMENT/PLAN:  Radha was seen today for urgent care and headache.    Diagnoses and all orders for this visit:    Other migraine without status migrainosus, not intractable  -     ketorolac (TORADOL) injection 60 mg  -     ondansetron (ZOFRAN-ODT) ODT tab 4 mg    Patient was given Toradol and Zofran in the clinic.  She was watched for 5 minutes but then had to leave at her own request.  She had not started feeling better but she likely will call.  If she is not improving or experiences new or worsening symptoms either return to clinic or the ED.  She has used a Medrol Dosepak in the past for migraines that are unresponsive to these measures I recommend she contact her neurologist if she is not improving over the course the next day or 2.  She can also consider using Benadryl.  Overall patient appears well and exam is reassuring.  Did consider other diagnosises    25 minutes spent on the date of the encounter doing chart review, history and exam, documentation and further activities as noted above    The plan of care was discussed with the patient. They understand and agree with the course of treatment prescribed. A printed summary was given including instructions and medications.    SUBJECTIVE:  Radha is a 45 year old female with a history of migraine who presents to urgent care with 7 days of migraine.  She has been using Maxalt and naratriptan with Maxalt did not work.  This has been working each day and causing her to have about 12 hours of relief.  Today there was no relief after both these medications.  The pain spread to all of her head versus just the left or right side as it was previously.  She has migraines like this it did not respond her at home medications once or twice a year.  She is followed by neurology for this.  She was seen in our clinic last August and  responded well to Toradol and Zofran.  She does report some nausea and took some Phenergan this morning which did help.  She denies any fever, chills, vomiting, abdominal pain, shortness of breath, wheeze, chest pain.  She denies any muscle aches.  She denies any aura, vision changes, hearing changes, dizziness, presyncope, balance issues, paresthesias, weakness or gait disturbances.  She can feel some pain radiating to her neck but denies any neck trauma.    ROS: Pertinent ROS neg other than the symptoms noted above in the HPI.     OBJECTIVE:  /79 (BP Location: Right arm, Patient Position: Sitting, Cuff Size: Adult Large)   Pulse 98   Temp 98.3  F (36.8  C) (Tympanic)   Resp 16   LMP 01/04/2021   SpO2 98%   Breastfeeding No    GENERAL: Cooperative, alert, appears in mild pain  EYES: Eyes grossly normal to inspection, PERRL and conjunctivae and sclerae normal, left eyelids minimally more constricted  HENT: No significant sinus tenderness, patent oropharynx, scalp nontender  NECK: no adenopathy, no asymmetry, masses, no cervical tenderness or paravertebral muscular tenderness  RESP: No respiratory distress  MS: no gross musculoskeletal defects noted, no edema  NEURO: Upper and lower strength 5/5 and symmetric, cranial nerves grossly intact, no focal deficit, heel-to-shin normal, gait normal, finger-to-nose normal  PSYCH: mentation appears normal, affect normal    Current Outpatient Medications   Medication     metFORMIN (GLUCOPHAGE-XR) 500 MG 24 hr tablet     naproxen (NAPROSYN) 500 MG tablet     naratriptan (AMERGE) 2.5 MG tablet     promethazine (PHENERGAN) 25 MG tablet     rizatriptan (MAXALT) 5 MG tablet     sertraline (ZOLOFT) 50 MG tablet     No current facility-administered medications for this visit.       Patient Active Problem List   Diagnosis     PCOS (polycystic ovarian syndrome)     Family history of aortic aneurysm- dad with marfan     Other migraine without status migrainosus, not  intractable     H/O LEEP     Anxiety     Vertebral artery dissection (H)     Gastroesophageal reflux disease without esophagitis     Essential (primary) hypertension     Neck pain     Sciatica     Seasonal allergies     Spasm of muscle     Vitamin deficiency     Migraine with aura      Past Medical History:   Diagnosis Date     2019 novel coronavirus disease (COVID-19)      Gallstone      Migraine     sees neurologist-      PCOS (polycystic ovarian syndrome)     on metformin     Past Surgical History:   Procedure Laterality Date     ARTHROSCOPY KNEE  1991    left     Breast reconstruction with implants  1998     CHOLECYSTECTOMY  2010     TONSILLECTOMY  1996     Family History   Problem Relation Age of Onset     Marfan Syndrome Father      Heart Disease Mother         mitral valve replacement at age 64     Aneurysm Mother      Social History     Tobacco Use     Smoking status: Former Smoker     Smokeless tobacco: Never Used   Substance Use Topics     Alcohol use: Yes     Alcohol/week: 0.8 - 1.7 standard drinks     Types: 1 - 2 Standard drinks or equivalent per week     Comment: yojana Rachel PA-C    The use of Dragon/Logic[employer] dictation services may have been used to construct the content in this note; any grammatical or spelling errors are non-intentional. Please contact the author of this note directly if you are in need of any clarification.

## 2021-02-10 ENCOUNTER — TRANSFERRED RECORDS (OUTPATIENT)
Dept: HEALTH INFORMATION MANAGEMENT | Facility: CLINIC | Age: 46
End: 2021-02-10

## 2021-04-15 DIAGNOSIS — G43.809 OTHER MIGRAINE WITHOUT STATUS MIGRAINOSUS, NOT INTRACTABLE: ICD-10-CM

## 2021-04-16 RX ORDER — NAPROXEN 500 MG/1
500 TABLET ORAL 2 TIMES DAILY PRN
Qty: 90 TABLET | Refills: 0 | Status: SHIPPED | OUTPATIENT
Start: 2021-04-16

## 2021-04-16 NOTE — TELEPHONE ENCOUNTER
Routing refill request to provider for review/approval because:  Drug interaction warning - sertraline and naproxen    Tab KRAMER RN

## 2021-05-12 ENCOUNTER — TRANSFERRED RECORDS (OUTPATIENT)
Dept: HEALTH INFORMATION MANAGEMENT | Facility: CLINIC | Age: 46
End: 2021-05-12

## 2021-05-14 DIAGNOSIS — G43.809 OTHER MIGRAINE WITHOUT STATUS MIGRAINOSUS, NOT INTRACTABLE: ICD-10-CM

## 2021-05-14 RX ORDER — NARATRIPTAN 2.5 MG/1
2.5 TABLET ORAL
Qty: 27 TABLET | Refills: 0 | Status: SHIPPED | OUTPATIENT
Start: 2021-05-14 | End: 2021-08-30

## 2021-05-14 NOTE — TELEPHONE ENCOUNTER
Prescription approved per [facility] Refill Protocol.  Tyler Perez RN, BSN

## 2021-06-01 ENCOUNTER — RECORDS - HEALTHEAST (OUTPATIENT)
Dept: ADMINISTRATIVE | Facility: CLINIC | Age: 46
End: 2021-06-01

## 2021-06-02 ENCOUNTER — RECORDS - HEALTHEAST (OUTPATIENT)
Dept: ADMINISTRATIVE | Facility: CLINIC | Age: 46
End: 2021-06-02

## 2021-06-07 DIAGNOSIS — G43.809 OTHER MIGRAINE WITHOUT STATUS MIGRAINOSUS, NOT INTRACTABLE: ICD-10-CM

## 2021-06-07 RX ORDER — NAPROXEN 500 MG/1
500 TABLET ORAL 2 TIMES DAILY PRN
Qty: 90 TABLET | Refills: 0 | OUTPATIENT
Start: 2021-06-07

## 2021-07-25 ENCOUNTER — HEALTH MAINTENANCE LETTER (OUTPATIENT)
Age: 46
End: 2021-07-25

## 2021-08-12 ENCOUNTER — TRANSFERRED RECORDS (OUTPATIENT)
Dept: HEALTH INFORMATION MANAGEMENT | Facility: CLINIC | Age: 46
End: 2021-08-12

## 2021-08-29 DIAGNOSIS — G43.809 OTHER MIGRAINE WITHOUT STATUS MIGRAINOSUS, NOT INTRACTABLE: ICD-10-CM

## 2021-08-29 NOTE — LETTER
August 30, 2021      Radha Fuentes  [address] [address]        Dear MsTroymamta,    We are writing to inform you of your test results.    {results letter list:[id number]}    No results found from the In Basket message.    If you have any questions or concerns, please call the clinic at the number listed above.       Sincerely,

## 2021-08-30 RX ORDER — NARATRIPTAN 2.5 MG/1
2.5 TABLET ORAL
Qty: 27 TABLET | Refills: 0 | Status: SHIPPED | OUTPATIENT
Start: 2021-08-30 | End: 2022-01-04

## 2021-09-19 ENCOUNTER — HEALTH MAINTENANCE LETTER (OUTPATIENT)
Age: 46
End: 2021-09-19

## 2021-09-24 ENCOUNTER — ANCILLARY PROCEDURE (OUTPATIENT)
Dept: MAMMOGRAPHY | Facility: CLINIC | Age: 46
End: 2021-09-24
Attending: FAMILY MEDICINE
Payer: COMMERCIAL

## 2021-09-24 DIAGNOSIS — Z12.31 VISIT FOR SCREENING MAMMOGRAM: ICD-10-CM

## 2021-09-24 PROCEDURE — 77067 SCR MAMMO BI INCL CAD: CPT | Mod: TC | Performed by: RADIOLOGY

## 2021-09-24 PROCEDURE — 77063 BREAST TOMOSYNTHESIS BI: CPT | Mod: TC | Performed by: RADIOLOGY

## 2021-12-07 ENCOUNTER — OFFICE VISIT (OUTPATIENT)
Dept: PODIATRY | Facility: CLINIC | Age: 46
End: 2021-12-07
Payer: COMMERCIAL

## 2021-12-07 VITALS — SYSTOLIC BLOOD PRESSURE: 140 MMHG | DIASTOLIC BLOOD PRESSURE: 99 MMHG

## 2021-12-07 DIAGNOSIS — M79.672 ACUTE FOOT PAIN, LEFT: Primary | ICD-10-CM

## 2021-12-07 DIAGNOSIS — L60.0 INGROWN NAIL OF GREAT TOE OF LEFT FOOT: ICD-10-CM

## 2021-12-07 PROCEDURE — 11750 EXCISION NAIL&NAIL MATRIX: CPT | Mod: TA | Performed by: PODIATRIST

## 2021-12-07 RX ORDER — SILVER SULFADIAZINE 10 MG/G
CREAM TOPICAL 2 TIMES DAILY
Qty: 25 G | Refills: 1 | Status: SHIPPED | OUTPATIENT
Start: 2021-12-07

## 2021-12-07 NOTE — LETTER
12/7/2021         RE: Radha Fuentes  [address] [facility] [address]        Dear Colleague,    Thank you for referring your patient, Radha Fuentes, to the [facility]. Please see a copy of my visit note below.    Podiatry / Foot and Ankle Surgery Progress Note    December 7, 2021    Subject: Patient was seen for ingrown nail right great toe.  Notes that he needs to come back.  Is slightly painful.  3 out of 10 especially with pressure.  Denies injury.  Denies fever, nausea, vomiting    Objective:  Vitals:n BP (!) 140/99     General:  Patient is alert and orientated.  NAD.    Vascular:  DP and PT pulses are palpable.  No edema or varicosities noted.  CFT's < 3secs.  Skin temp is normal.    Neuro:  Light and gross touch sensation intact to digits, dorsum, and plantar aspects of the feet.    Derm: Medial border of left great toenail is incurvated.  Pain on palpation.    Musculoskeletal:  No foot deformity noted.      Assessment:    Acute foot pain, left  Ingrown nail of great toe of left foot    Medical Decision Making/Plan:  The potential causes and nature of an ingrown toenail were discussed with the patient.  We reviewed the natural history/prognosis of the condition and potential risks if no treatment is provided.      Treatment options discussed included conservative management (oral antibiotics, soaking of foot, adequate width shoes)  as well as surgical management (partial or total nail removal).  The pros and cons of both forms of treatment were reviewed.      Recommend removal of the side of the nail with phenol to prevent it from coming back.  She will soak the foot twice a day for 6 weeks and apply Silvadene cream and a Band-Aid.  All questions were answered patient satisfaction and she will call service.    Procedure: After verbal consent, the left big toe was anesthetized with 5cc's of 1% lidocaine plain. A tourniquet was applied to the toe. The medial  border was then raised from the nail bed and then cut the length of the nail.  The offending nail border was then removed.  Three 30 second applications of phenol were applied to the nail bed and nail matrix.  The area was then flushed with copious amounts of alcohol.  Bacitracin was applied to the nail bed.  The tourniquet was removed.  Bandage was applied to the toe.  The patient tolerated the procedure and anesthesia well.    Patient Risk Factor:  Patient is a low risk factor for infection.     Maxine Win DPM, Podiatry/Foot and Ankle Surgery        Again, thank you for allowing me to participate in the care of your patient.        Sincerely,        Maxine Win DPM, Podiatry/Foot and Ankle Surgery

## 2021-12-07 NOTE — PROGRESS NOTES
Podiatry / Foot and Ankle Surgery Progress Note    December 7, 2021    Subject: Patient was seen for ingrown nail right great toe.  Notes that he needs to come back.  Is slightly painful.  3 out of 10 especially with pressure.  Denies injury.  Denies fever, nausea, vomiting    Objective:  Vitals:n BP (!) 140/99     General:  Patient is alert and orientated.  NAD.    Vascular:  DP and PT pulses are palpable.  No edema or varicosities noted.  CFT's < 3secs.  Skin temp is normal.    Neuro:  Light and gross touch sensation intact to digits, dorsum, and plantar aspects of the feet.    Derm: Medial border of left great toenail is incurvated.  Pain on palpation.    Musculoskeletal:  No foot deformity noted.      Assessment:    Acute foot pain, left  Ingrown nail of great toe of left foot    Medical Decision Making/Plan:  The potential causes and nature of an ingrown toenail were discussed with the patient.  We reviewed the natural history/prognosis of the condition and potential risks if no treatment is provided.      Treatment options discussed included conservative management (oral antibiotics, soaking of foot, adequate width shoes)  as well as surgical management (partial or total nail removal).  The pros and cons of both forms of treatment were reviewed.      Recommend removal of the side of the nail with phenol to prevent it from coming back.  She will soak the foot twice a day for 6 weeks and apply Silvadene cream and a Band-Aid.  All questions were answered patient satisfaction and she will call service.    Procedure: After verbal consent, the left big toe was anesthetized with 5cc's of 1% lidocaine plain. A tourniquet was applied to the toe. The medial border was then raised from the nail bed and then cut the length of the nail.  The offending nail border was then removed.  Three 30 second applications of phenol were applied to the nail bed and nail matrix.  The area was then flushed with copious amounts of  alcohol.  Bacitracin was applied to the nail bed.  The tourniquet was removed.  Bandage was applied to the toe.  The patient tolerated the procedure and anesthesia well.    Patient Risk Factor:  Patient is a low risk factor for infection.     Maxine Win DPM, Podiatry/Foot and Ankle Surgery

## 2021-12-07 NOTE — PATIENT INSTRUCTIONS
Thank you for choosing [facility] Podiatry / Foot & Ankle Surgery!    DR. MOORE'S CLINIC:  [facility]   [address]   [phone]   [place] [address]   [phone]  [phone]      SCHEDULE SURGERY: [phone]   BILLING QUESTIONS: [phone]   APPOINTMENTS: [phone]   RADIOLOGY: [phone]   CONSUMER PRICE LINE: [phone]      Follow up: as needed    Next steps: Soak the foot per instructions below.   Silvadene and apply after soaking.      INGROWN TOENAILS  When a toenail is ingrown, it is curved and grows into the skin, usually at the nail borders (the sides of the nail). This  digging in  of the nail irritates the skin, often creating pain, redness, swelling, and warmth in the toe.  If an ingrown nail causes a break in the skin, bacteria may enter and cause an infection in the area, which is often marked by drainage and a foul odor. However, even if the toe isn t painful, red, swollen, or warm, a nail that curves downward into the skin can progress to an infection.  CAUSES:  Heredity: In many people, the tendency for ingrown toenails is inherited.   Trauma: Sometimes an ingrown toenail is the result of trauma, such as stubbing your toe, having an object fall on your toe, or engaging in activities that involve repeated pressure on the toes, such as kicking or running.   Improper Trimming:  The most common cause of ingrown toenails is cutting your nails too short. This encourages the skin next to the nail to fold over the nail.   Improperly Sized Footwear: Ingrown toenails can result from wearing socks and shoes that are tight or short.   Nail Conditions: Ingrown toenails can be caused by nail problems, such as fungal infections or losing a nail due to trauma.   TREATMENT: Sometimes initial treatment for ingrown toenails can be safely performed at home. However, home treatment is strongly discouraged if an infection is suspected, or for those who have medical  conditions that put feet at high risk, such as diabetes, nerve damage in the foot, or poor circulation.  Home care: If you don t have an infection or any of the above medical conditions, you can soak your foot in room-temperature water (adding Epsom s salt may be recommended by your doctor), and gently massage the side of the nail fold to help reduce the inflammation.  Avoid attempting  bathroom surgery.  Repeated cutting of the nail can cause the condition to worsen over time. If your symptoms fail to improve, it s time to see a foot and ankle surgeon.  Physician care: After examining the toe, the foot and ankle surgeon will select the treatment best suited for you. If an infection is present, an oral antibiotic may be prescribed.  Sometimes a minor surgical procedure, often performed in the office, will ease the pain and remove the offending nail. After applying a local anesthetic, the doctor removes part of the nail s side border. Some nails may become ingrown again, requiring removal of the nail root.  Following the nail procedure, a light bandage will be applied. Most people experience very little pain after surgery and may resume normal activity the next day. If your surgeon has prescribed an oral antibiotic, be sure to take all the medication, even if your symptoms have improved.  PREVENTION:  Proper Trimming: Cut toenails in a fairly straight line, and don t cut them too short. You should be able to get your fingernail under the sides and end of the nail.   Well-fitting Footwear: Don t wear shoes that are short or tight in the toe area. Avoid shoes that are loose, because they too cause pressure on the toes, especially when running or walking briskly.     INGROWN TOENAIL REMOVAL AFTERCARE     Go directly home and elevate the affected foot on one or two pillows for the remainder of the day/evening if possible. Your toe may stay numb anywhere from 2-8 hours.     Take Tylenol, ibuprofen or another  anti-inflammatory as needed for pain.     Take antibiotic if that has been prescribed. Finish the entire prescribed antibiotic even if your symptoms have improved.     The evening of the procedure, soak/wash the affected area in warm water (you may add Epsom salt) for 5 to 10 minutes. Do this twice a day for 2-4 weeks (6-8 weeks if you had phenol) (you may count showering/bathing as one soak).  After soaks, pat the area dry and then allow to airdry for a few minutes. Apply antibiotic ointment to the area and cover with 2 X 2 gauze and paper tape or band-aid.    You may pursue everyday activities as tolerated with either an open toe shoe or cut-out shoe as needed or you may wear regular shoes if no pain is noted.    Watch for any signs and symptoms of infection such as: redness, red streaks going up the foot/leg, swelling, pus or foul odor. Those that have had the phenol procedure, the toe will drain longer and will look like it is infected because it is a chemical burn.     Please call with questions.        Flu vaccines are now available at all [facility] clinics and retail pharmacies across the [facility]. Appointments are required for clinic locations. To schedule an appointment online, please log into [employer] or create an account if you are a new user. You can also call [phone], or simply walk in at one of the [facility] retail pharmacy locations.

## 2022-01-02 DIAGNOSIS — G43.809 OTHER MIGRAINE WITHOUT STATUS MIGRAINOSUS, NOT INTRACTABLE: ICD-10-CM

## 2022-01-04 RX ORDER — NARATRIPTAN 2.5 MG/1
2.5 TABLET ORAL
Qty: 27 TABLET | Refills: 0 | Status: SHIPPED | OUTPATIENT
Start: 2022-01-04

## 2022-01-04 NOTE — TELEPHONE ENCOUNTER
Routing refill request to provider for review/approval because:  Labs out of range:  BP    BP Readings from Last 3 Encounters:   12/07/21 (!) 140/99   01/12/21 132/79   10/16/20 (!) 138/90     Tab KRAMER RN

## 2022-01-09 ENCOUNTER — HEALTH MAINTENANCE LETTER (OUTPATIENT)
Age: 47
End: 2022-01-09

## 2022-11-21 ENCOUNTER — HEALTH MAINTENANCE LETTER (OUTPATIENT)
Age: 47
End: 2022-11-21

## 2022-12-25 ENCOUNTER — HEALTH MAINTENANCE LETTER (OUTPATIENT)
Age: 47
End: 2022-12-25

## 2023-01-01 NOTE — PROGRESS NOTES
Ms. 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 It probably started 2-3 weeks ago when she had the neck pain starting after trying to carry a heavy thing. Started on  mg 4 days ago. Yesterday she presented with feeling numb/tingling left hand, with weakness of left face and arm. Her weakness resolved in the ED. She had NIHSS1 for mild sensory loss. CTA: we again see the left V2 dissection, and there is a Rt V3 irregularity its probably a healing dissection (radiology thinks it was probably there in the prior MRI). There is no suggestion of intracranial dissection.  MRI brain 8/11 was negative for acute stroke.     Physical exam:  HEENT:  Normocephalic/atraumatic  Cardio:  Regular rate and rhythm  Pulmonary:  No respiratory distress  Extremities:  No edema  Skin:  Warm/dry     Neurologic Examination:  Sitting up in bed. No distress. Alert. Conversant. Answers questions appropriately. No dysarthria or evidence of aphasia. Extraocular movements intact. Left palpebral fissure smaller than right. Blink is slower on the left versus the right. Pupils are symmetric and reactive bilaterally. Moves extremities symmetrically.     ASSESSMENT:  The patient probably had a TIA yesterday secondary to the vertebral dissection. She has reduced palpebral fissure of the left eye, which is unclear in significant. Other neurologic symptoms remain resolved. We will escalate the previous antiplatelet plan with the addition of Plavix. Rest of the recommendations below. No need to keep her in the hospital from a neurologic standpoint.     RECOMMENDATIONS:  1. Aspirin 325 mg daily and Plavix 75 mg daily x 3 months  2. Follow up with stroke clinic in 3 months   3. MRA head/neck with fat saturation sequences to be performed at the time of stroke follow up (3 months)  4. Avoid rapid neck twists and excessive stretching of the neck (to avoid further dissections)  5. Avoid taking Imitrex from  this point forward.      3.467 3.485

## 2023-04-16 ENCOUNTER — HEALTH MAINTENANCE LETTER (OUTPATIENT)
Age: 48
End: 2023-04-16

## 2023-09-18 ENCOUNTER — LAB REQUISITION (OUTPATIENT)
Dept: LAB | Facility: CLINIC | Age: 48
End: 2023-09-18
Payer: COMMERCIAL

## 2023-09-18 DIAGNOSIS — Z13.220 ENCOUNTER FOR SCREENING FOR LIPOID DISORDERS: ICD-10-CM

## 2023-09-18 DIAGNOSIS — Z87.42 PERSONAL HISTORY OF OTHER DISEASES OF THE FEMALE GENITAL TRACT: ICD-10-CM

## 2023-09-18 DIAGNOSIS — I10 ESSENTIAL (PRIMARY) HYPERTENSION: ICD-10-CM

## 2023-09-18 DIAGNOSIS — E28.2 POLYCYSTIC OVARIAN SYNDROME: ICD-10-CM

## 2023-09-18 LAB
ALBUMIN SERPL BCG-MCNC: 3.9 G/DL (ref 3.5–5.2)
ALP SERPL-CCNC: 49 U/L (ref 35–104)
ALT SERPL W P-5'-P-CCNC: 10 U/L (ref 0–50)
ANION GAP SERPL CALCULATED.3IONS-SCNC: 9 MMOL/L (ref 7–15)
AST SERPL W P-5'-P-CCNC: 20 U/L (ref 0–45)
BILIRUB SERPL-MCNC: 0.3 MG/DL
BUN SERPL-MCNC: 8.8 MG/DL (ref 6–20)
CALCIUM SERPL-MCNC: 8.8 MG/DL (ref 8.6–10)
CHLORIDE SERPL-SCNC: 106 MMOL/L (ref 98–107)
CHOLEST SERPL-MCNC: 173 MG/DL
CREAT SERPL-MCNC: 0.66 MG/DL (ref 0.51–0.95)
DEPRECATED HCO3 PLAS-SCNC: 25 MMOL/L (ref 22–29)
EGFRCR SERPLBLD CKD-EPI 2021: >90 ML/MIN/1.73M2
ERYTHROCYTE [DISTWIDTH] IN BLOOD BY AUTOMATED COUNT: 14 % (ref 10–15)
GLUCOSE SERPL-MCNC: 91 MG/DL (ref 70–99)
HBA1C MFR BLD: 5.2 %
HCT VFR BLD AUTO: 39.5 % (ref 35–47)
HDLC SERPL-MCNC: 64 MG/DL
HGB BLD-MCNC: 12.2 G/DL (ref 11.7–15.7)
LDLC SERPL CALC-MCNC: 88 MG/DL
MCH RBC QN AUTO: 27.8 PG (ref 26.5–33)
MCHC RBC AUTO-ENTMCNC: 30.9 G/DL (ref 31.5–36.5)
MCV RBC AUTO: 90 FL (ref 78–100)
NONHDLC SERPL-MCNC: 109 MG/DL
PLATELET # BLD AUTO: 335 10E3/UL (ref 150–450)
POTASSIUM SERPL-SCNC: 4.1 MMOL/L (ref 3.4–5.3)
PROT SERPL-MCNC: 6.5 G/DL (ref 6.4–8.3)
RBC # BLD AUTO: 4.39 10E6/UL (ref 3.8–5.2)
SODIUM SERPL-SCNC: 140 MMOL/L (ref 136–145)
TRIGL SERPL-MCNC: 103 MG/DL
TSH SERPL DL<=0.005 MIU/L-ACNC: 3.18 UIU/ML (ref 0.3–4.2)
WBC # BLD AUTO: 5.5 10E3/UL (ref 4–11)

## 2023-09-18 PROCEDURE — 84443 ASSAY THYROID STIM HORMONE: CPT | Mod: ORL | Performed by: OBSTETRICS & GYNECOLOGY

## 2023-09-18 PROCEDURE — 80053 COMPREHEN METABOLIC PANEL: CPT | Mod: ORL | Performed by: OBSTETRICS & GYNECOLOGY

## 2023-09-18 PROCEDURE — 85027 COMPLETE CBC AUTOMATED: CPT | Mod: ORL | Performed by: OBSTETRICS & GYNECOLOGY

## 2023-09-18 PROCEDURE — 80061 LIPID PANEL: CPT | Mod: ORL | Performed by: OBSTETRICS & GYNECOLOGY

## 2023-09-18 PROCEDURE — 87624 HPV HI-RISK TYP POOLED RSLT: CPT | Mod: ORL | Performed by: OBSTETRICS & GYNECOLOGY

## 2023-09-18 PROCEDURE — 83036 HEMOGLOBIN GLYCOSYLATED A1C: CPT | Mod: ORL | Performed by: OBSTETRICS & GYNECOLOGY

## 2023-09-18 PROCEDURE — G0145 SCR C/V CYTO,THINLAYER,RESCR: HCPCS | Mod: ORL | Performed by: OBSTETRICS & GYNECOLOGY

## 2023-09-21 LAB
BKR LAB AP GYN ADEQUACY: NORMAL
BKR LAB AP GYN INTERPRETATION: NORMAL
BKR LAB AP HPV REFLEX: NORMAL
BKR LAB AP LMP: NORMAL
BKR LAB AP PREVIOUS ABNL DX: NORMAL
BKR LAB AP PREVIOUS ABNORMAL: NORMAL
PATH REPORT.COMMENTS IMP SPEC: NORMAL
PATH REPORT.COMMENTS IMP SPEC: NORMAL
PATH REPORT.RELEVANT HX SPEC: NORMAL

## 2023-09-22 LAB
HUMAN PAPILLOMA VIRUS 16 DNA: NEGATIVE
HUMAN PAPILLOMA VIRUS 18 DNA: NEGATIVE
HUMAN PAPILLOMA VIRUS FINAL DIAGNOSIS: NORMAL
HUMAN PAPILLOMA VIRUS OTHER HR: NEGATIVE

## 2024-02-04 ENCOUNTER — HEALTH MAINTENANCE LETTER (OUTPATIENT)
Age: 49
End: 2024-02-04

## 2024-03-27 NOTE — TELEPHONE ENCOUNTER
Routing refill request to provider for review/approval because:  Labs out of range:  BP  Labs not current:  Alt/ast/cbc/cr  BP Readings from Last 3 Encounters:   03/03/20 (!) 135/93   12/19/19 124/78   12/12/19 126/78     Tyler Perez RN, BSN          
Yes

## 2024-06-23 ENCOUNTER — HEALTH MAINTENANCE LETTER (OUTPATIENT)
Age: 49
End: 2024-06-23

## 2024-07-19 ENCOUNTER — TRANSFERRED RECORDS (OUTPATIENT)
Dept: HEALTH INFORMATION MANAGEMENT | Facility: CLINIC | Age: 49
End: 2024-07-19
Payer: COMMERCIAL

## 2024-10-14 ENCOUNTER — TRANSFERRED RECORDS (OUTPATIENT)
Dept: HEALTH INFORMATION MANAGEMENT | Facility: CLINIC | Age: 49
End: 2024-10-14
Payer: COMMERCIAL

## 2025-07-02 ENCOUNTER — TRANSFERRED RECORDS (OUTPATIENT)
Dept: HEALTH INFORMATION MANAGEMENT | Facility: CLINIC | Age: 50
End: 2025-07-02
Payer: COMMERCIAL